# Patient Record
Sex: MALE | Race: WHITE | NOT HISPANIC OR LATINO | URBAN - METROPOLITAN AREA
[De-identification: names, ages, dates, MRNs, and addresses within clinical notes are randomized per-mention and may not be internally consistent; named-entity substitution may affect disease eponyms.]

---

## 2017-05-11 ENCOUNTER — OUTPATIENT (OUTPATIENT)
Dept: OUTPATIENT SERVICES | Facility: HOSPITAL | Age: 72
LOS: 1 days | Discharge: HOME | End: 2017-05-11

## 2017-05-11 VITALS
RESPIRATION RATE: 14 BRPM | OXYGEN SATURATION: 97 % | WEIGHT: 300.05 LBS | SYSTOLIC BLOOD PRESSURE: 153 MMHG | TEMPERATURE: 97 F | HEIGHT: 74 IN | DIASTOLIC BLOOD PRESSURE: 74 MMHG | HEART RATE: 60 BPM

## 2017-05-11 DIAGNOSIS — Z98.890 OTHER SPECIFIED POSTPROCEDURAL STATES: Chronic | ICD-10-CM

## 2017-05-11 RX ORDER — SODIUM CHLORIDE 9 MG/ML
1000 INJECTION, SOLUTION INTRAVENOUS
Qty: 0 | Refills: 0 | Status: DISCONTINUED | OUTPATIENT
Start: 2017-05-11 | End: 2017-05-26

## 2017-05-19 DIAGNOSIS — G35 MULTIPLE SCLEROSIS: ICD-10-CM

## 2017-05-19 DIAGNOSIS — G20 PARKINSON'S DISEASE: ICD-10-CM

## 2017-05-19 DIAGNOSIS — Z12.11 ENCOUNTER FOR SCREENING FOR MALIGNANT NEOPLASM OF COLON: ICD-10-CM

## 2017-11-19 NOTE — ASU PREOP CHECKLIST - SIDE RAILS UP
Dolor de blake general sin causa   (General Headache Without Cause)    EL dolor de blake es un dolor o malestar que se siente en la alonzo de la blake o del flaco. Puede no tener cindy causa específica. Hay muchas causas y tipos de cristino de blake. Los más comunes son:   · Cefalea tensional.  · Cefaleas migrañosas.  · Cefalea en brotes.  · Cefaleas diarias crónicas.  INSTRUCCIONES PARA EL CUIDADO EN EL HOGAR   · Cumpla con todas las citas programadas con contreras médico o con el especialista al que lo hayan derivado.  · Sólo tome medicamentos de venta saqib o recetados para calmar el dolor o el malestar, según las indicaciones de contreras médico.  · Cuando sienta dolor de blake acuéstese en un cuarto oscuro y tranquilo.  · Lleve un registro diario para averiguar lo que puede provocarlo. Por ejemplo, escriba:  ¨ Lo que come y cassidy.  ¨ Cuánto tiempo duerme.  ¨ Todo cambio en la dieta o medicamentos.  · Intente con masajes u otras técnicas de relajación.  · Colóquese compresas de hielo o calor en la blake y en el flaco. Úselos 3 a 4 veces por día de 15 a 20 minutos por vez, o nuzhat sea necesario.  · Limite las situaciones de estrés.  · Siéntese con la espalda recta y no  tense los músculos.  · Si fuma, deje de hacerlo.  · Limite el consumo de bebidas alcohólicas.  · Consuma menos cantidad de cafeína o deje de tomarla.  · Coma y duerma en horarios regulares.  · Duerma entre 7 y 9 horas o nuzhat lo indique contreras médico.  · Mantenga las luces tenues si le molestan las luces brillantes y empeoran el dolor de blake.  SOLICITE ATENCIÓN MÉDICA SI:   · Tiene problemas con los medicamentos que le recetaron.  · Los medicamentos no le hacen efecto.  · El dolor de blake que sentía habitualmente es diferente.  · Tiene náuseas o vómitos.  SOLICITE ATENCIÓN MÉDICA DE INMEDIATO SI:   · El dolor se hace cada vez más intenso.  · Tiene fiebre.  · Presenta rigidez en el flaco.  · Sufre pérdida de la visión.  · Presenta debilidad muscular o pérdida  del control muscular.  · Comienza a perder el equilibrio o tiene problemas para caminar.  · Sufre mareos o se desmaya.  · Tiene síntomas graves que son diferentes a los primeros síntomas.     Esta información no tiene nuzhat fin reemplazar el consejo del médico. Asegúrese de hacerle al médico cualquier pregunta que tenga.     Document Released: 09/27/2006 Document Revised: 05/03/2016  [employer] Interactive Patient Education ©2016 [employer]    Sinusitis en adultos  (Sinusitis, Adult)  La sinusitis es el enrojecimiento, el dolor y la inflamación de los senos paranasales. Los senos paranasales son cavidades de aire que están dentro de los huesos de la liban. Se encuentran debajo de los ojos, en la mitad de la frente y encima de los ojos. En los senos paranasales sanos, el moco puede drenar y el aire circula a través de ellos en contreras guido hacia la nariz. Sin embargo, cuando se inflaman, el moco y el aire quedan atrapados. [place] hace que se desarrollen bacterias y otros gérmenes que causan infección.  La sinusitis puede desarrollarse rápidamente y durar solo un tiempo corto (aguda) o continuar por un período natty (crónica). La sinusitis que dura más de 12 semanas se considera crónica.  CAUSAS  Las causas de la sinusitis son:  · Alergias.  · Las anomalías estructurales, nuzhat el desplazamiento del cartílago que separa las fosas nasales (desvío del tabique), que puede disminuir el flujo de aire por la nariz y los senos paranasales, y afectar contreras drenaje.  · Las anomalías funcionales, nuzhat cuando los pequeños pelos (cilias) que se encuentran en los senos paranasales y ayudan a eliminar el moco no funcionan correctamente o no están presentes.  SIGNOS Y SÍNTOMAS  Los síntomas de la sinusitis aguda y crónica son los mismos. Los síntomas principales son el dolor y la presión alrededor de los senos paranasales afectados. Otros síntomas son:  · Dolor en los dientes superiores.  · Dolor de oídos.  · Dolor de blake.  · Mal  aliento.  · Disminución del sentido del olfato y del gusto.  · Tos, que empeora al acostarse.  · Fatiga.  · Fiebre.  · Drenaje de moco espeso por la nariz, que generalmente es de color yesenia y puede contener pus (purulento).  · Hinchazón y calor en los senos paranasales afectados.  DIAGNÓSTICO  Contreras médico le realizará un examen físico. [place] el examen, el médico puede hacer cualquiera de estas cosas para determinar si usted tiene sinusitis aguda o crónica:  · Le revisará la nariz para buscar signos de crecimientos anormales en las fosas nasales (pólipos nasales).  · [place] los senos paranasales afectados para buscar signos de infección.  · Observará la parte interna de los senos paranasales con un dispositivo que tiene cindy nino (endoscopio).  Si el médico sospecha que usted sufre sinusitis crónica, podrá indicar cindy o más de las siguientes pruebas:  · Pruebas de alergia.  · Cultivo de las secreciones nasales. Se extrae cindy muestra de moco de la nariz, que se envía al laboratorio para detectar bacterias.  · Citología nasal. Se extrae cindy muestra de moco de la nariz, que el médico examina para determinar si la sinusitis está relacionada con cindy alergia.  TRATAMIENTO  La mayoría de los casos de sinusitis aguda se deben a cindy infección viral y se resuelven espontáneamente en un período de 10 días. A veces, se recetan medicamentos nuzhat ayuda para aliviar los síntomas tanto de la sinusitis aguda nuzhat de la crónica. Estos pueden incluir analgésicos, descongestivos, aerosoles nasales con corticoides o aerosoles de solución salina.  Sin embargo, para la sinusitis por infección bacteriana, el médico le recetará antibióticos. Los antibióticos son medicamentos que destruyen las bacterias que causan la infección.  Con poca frecuencia, la sinusitis tiene contreras origen en cindy infección por hongos. En estos casos, el médico le recetará un medicamento antimicótico.  Para algunos casos de sinusitis crónica, es necesario someterse a cindy  cirugía. Generalmente, se trata de casos en los que la sinusitis se repite más de 3 veces al año, a pesar de otros tratamientos.  INSTRUCCIONES PARA EL CUIDADO EN EL HOGAR  · Beber abundante agua. Los líquidos ayudan a disolver el moco, para que drene más fácilmente de los senos paranasales.  · Use un humidificador.  · Inhale vapor de 3 a 4 veces al día (por ejemplo, siéntese en el baño con la ducha abierta).  · Aplíquese un paño tibio y húmedo en la liban 3 o 4 veces al día o según las indicaciones del médico.  · Use un aerosol nasal salino para ayudar a humedecer y limpiar los senos nasales.  · [place] los medicamentos solamente nuzhat se lo haya indicado el médico.  · Si le recetaron un antimicótico o un antibiótico, asegúrese de terminarlos, incluso si comienza a sentirse mejor.  SOLICITE ATENCIÓN MÉDICA DE INMEDIATO SI:  · Siente más dolor o sufre cristino de blake intensos.  · Tiene náuseas, vómitos o somnolencia.  · Observa hinchazón alrededor del suman.  · Tiene problemas de visión.  · Presenta rigidez en el flaco.  · Tiene dificultad para respirar.     Esta información no tiene nuzhat fin reemplazar el consejo del médico. Asegúrese de hacerle al médico cualquier pregunta que tenga.     Document Released: 09/27/2006 Document Revised: 01/08/2016  [employer] Interactive Patient Education ©2016 [employer]     done

## 2019-07-16 ENCOUNTER — INPATIENT (INPATIENT)
Facility: HOSPITAL | Age: 74
LOS: 2 days | Discharge: NURSING, REHAB OR LONG-TERM CARE | End: 2019-07-19
Attending: HOSPITALIST | Admitting: INTERNAL MEDICINE
Payer: MEDICARE

## 2019-07-16 VITALS
SYSTOLIC BLOOD PRESSURE: 106 MMHG | DIASTOLIC BLOOD PRESSURE: 74 MMHG | HEART RATE: 78 BPM | TEMPERATURE: 99 F | RESPIRATION RATE: 18 BRPM | OXYGEN SATURATION: 96 %

## 2019-07-16 DIAGNOSIS — K56.7 ILEUS, UNSPECIFIED: ICD-10-CM

## 2019-07-16 DIAGNOSIS — G20 PARKINSON'S DISEASE: ICD-10-CM

## 2019-07-16 DIAGNOSIS — G35 MULTIPLE SCLEROSIS: ICD-10-CM

## 2019-07-16 DIAGNOSIS — Z29.9 ENCOUNTER FOR PROPHYLACTIC MEASURES, UNSPECIFIED: ICD-10-CM

## 2019-07-16 DIAGNOSIS — Z95.0 PRESENCE OF CARDIAC PACEMAKER: Chronic | ICD-10-CM

## 2019-07-16 DIAGNOSIS — L97.109 NON-PRESSURE CHRONIC ULCER OF UNSPECIFIED THIGH WITH UNSPECIFIED SEVERITY: ICD-10-CM

## 2019-07-16 DIAGNOSIS — Z98.890 OTHER SPECIFIED POSTPROCEDURAL STATES: Chronic | ICD-10-CM

## 2019-07-16 PROBLEM — K58.2 MIXED IRRITABLE BOWEL SYNDROME: Chronic | Status: ACTIVE | Noted: 2017-05-11

## 2019-07-16 LAB
ABO RH CONFIRMATION: SIGNIFICANT CHANGE UP
ALBUMIN SERPL ELPH-MCNC: 3.4 G/DL — SIGNIFICANT CHANGE UP (ref 3.3–5)
ALP SERPL-CCNC: 123 U/L — HIGH (ref 40–120)
ALT FLD-CCNC: 14 U/L — SIGNIFICANT CHANGE UP (ref 12–78)
ANION GAP SERPL CALC-SCNC: 7 MMOL/L — SIGNIFICANT CHANGE UP (ref 5–17)
APTT BLD: 27.7 SEC — SIGNIFICANT CHANGE UP (ref 27.5–36.3)
AST SERPL-CCNC: 12 U/L — LOW (ref 15–37)
BASOPHILS # BLD AUTO: 0.04 K/UL — SIGNIFICANT CHANGE UP (ref 0–0.2)
BASOPHILS NFR BLD AUTO: 0.4 % — SIGNIFICANT CHANGE UP (ref 0–2)
BILIRUB SERPL-MCNC: 0.7 MG/DL — SIGNIFICANT CHANGE UP (ref 0.2–1.2)
BLD GP AB SCN SERPL QL: SIGNIFICANT CHANGE UP
BUN SERPL-MCNC: 16 MG/DL — SIGNIFICANT CHANGE UP (ref 7–23)
CALCIUM SERPL-MCNC: 9.1 MG/DL — SIGNIFICANT CHANGE UP (ref 8.5–10.1)
CHLORIDE SERPL-SCNC: 105 MMOL/L — SIGNIFICANT CHANGE UP (ref 96–108)
CO2 SERPL-SCNC: 30 MMOL/L — SIGNIFICANT CHANGE UP (ref 22–31)
CREAT SERPL-MCNC: 1.1 MG/DL — SIGNIFICANT CHANGE UP (ref 0.5–1.3)
EOSINOPHIL # BLD AUTO: 0.06 K/UL — SIGNIFICANT CHANGE UP (ref 0–0.5)
EOSINOPHIL NFR BLD AUTO: 0.6 % — SIGNIFICANT CHANGE UP (ref 0–6)
GLUCOSE SERPL-MCNC: 138 MG/DL — HIGH (ref 70–99)
HCT VFR BLD CALC: 45.2 % — SIGNIFICANT CHANGE UP (ref 39–50)
HGB BLD-MCNC: 14.9 G/DL — SIGNIFICANT CHANGE UP (ref 13–17)
IMM GRANULOCYTES NFR BLD AUTO: 0.4 % — SIGNIFICANT CHANGE UP (ref 0–1.5)
INR BLD: 1.08 RATIO — SIGNIFICANT CHANGE UP (ref 0.88–1.16)
LYMPHOCYTES # BLD AUTO: 1.18 K/UL — SIGNIFICANT CHANGE UP (ref 1–3.3)
LYMPHOCYTES # BLD AUTO: 11.8 % — LOW (ref 13–44)
MAGNESIUM SERPL-MCNC: 2.5 MG/DL — SIGNIFICANT CHANGE UP (ref 1.6–2.6)
MCHC RBC-ENTMCNC: 29.3 PG — SIGNIFICANT CHANGE UP (ref 27–34)
MCHC RBC-ENTMCNC: 33 GM/DL — SIGNIFICANT CHANGE UP (ref 32–36)
MCV RBC AUTO: 88.8 FL — SIGNIFICANT CHANGE UP (ref 80–100)
MONOCYTES # BLD AUTO: 0.35 K/UL — SIGNIFICANT CHANGE UP (ref 0–0.9)
MONOCYTES NFR BLD AUTO: 3.5 % — SIGNIFICANT CHANGE UP (ref 2–14)
NEUTROPHILS # BLD AUTO: 8.33 K/UL — HIGH (ref 1.8–7.4)
NEUTROPHILS NFR BLD AUTO: 83.3 % — HIGH (ref 43–77)
PLATELET # BLD AUTO: 171 K/UL — SIGNIFICANT CHANGE UP (ref 150–400)
POTASSIUM SERPL-MCNC: 3.9 MMOL/L — SIGNIFICANT CHANGE UP (ref 3.5–5.3)
POTASSIUM SERPL-SCNC: 3.9 MMOL/L — SIGNIFICANT CHANGE UP (ref 3.5–5.3)
PROT SERPL-MCNC: 8.1 GM/DL — SIGNIFICANT CHANGE UP (ref 6–8.3)
PROTHROM AB SERPL-ACNC: 12 SEC — SIGNIFICANT CHANGE UP (ref 10–12.9)
RBC # BLD: 5.09 M/UL — SIGNIFICANT CHANGE UP (ref 4.2–5.8)
RBC # FLD: 13.2 % — SIGNIFICANT CHANGE UP (ref 10.3–14.5)
SODIUM SERPL-SCNC: 142 MMOL/L — SIGNIFICANT CHANGE UP (ref 135–145)
TYPE + AB SCN PNL BLD: SIGNIFICANT CHANGE UP
WBC # BLD: 10 K/UL — SIGNIFICANT CHANGE UP (ref 3.8–10.5)
WBC # FLD AUTO: 10 K/UL — SIGNIFICANT CHANGE UP (ref 3.8–10.5)

## 2019-07-16 PROCEDURE — 71045 X-RAY EXAM CHEST 1 VIEW: CPT | Mod: 26

## 2019-07-16 PROCEDURE — 74177 CT ABD & PELVIS W/CONTRAST: CPT | Mod: 26

## 2019-07-16 PROCEDURE — 93010 ELECTROCARDIOGRAM REPORT: CPT

## 2019-07-16 PROCEDURE — 99285 EMERGENCY DEPT VISIT HI MDM: CPT

## 2019-07-16 RX ORDER — BACLOFEN 100 %
1 POWDER (GRAM) MISCELLANEOUS
Qty: 0 | Refills: 0 | DISCHARGE

## 2019-07-16 RX ORDER — SODIUM CHLORIDE 9 MG/ML
1000 INJECTION INTRAMUSCULAR; INTRAVENOUS; SUBCUTANEOUS
Refills: 0 | Status: DISCONTINUED | OUTPATIENT
Start: 2019-07-16 | End: 2019-07-17

## 2019-07-16 RX ORDER — SODIUM CHLORIDE 9 MG/ML
1000 INJECTION INTRAMUSCULAR; INTRAVENOUS; SUBCUTANEOUS ONCE
Refills: 0 | Status: COMPLETED | OUTPATIENT
Start: 2019-07-16 | End: 2019-07-16

## 2019-07-16 RX ORDER — ALPRAZOLAM 0.25 MG
0 TABLET ORAL
Qty: 0 | Refills: 0 | DISCHARGE

## 2019-07-16 RX ORDER — PANTOPRAZOLE SODIUM 20 MG/1
40 TABLET, DELAYED RELEASE ORAL ONCE
Refills: 0 | Status: COMPLETED | OUTPATIENT
Start: 2019-07-16 | End: 2019-07-16

## 2019-07-16 RX ORDER — ONDANSETRON 8 MG/1
4 TABLET, FILM COATED ORAL ONCE
Refills: 0 | Status: COMPLETED | OUTPATIENT
Start: 2019-07-16 | End: 2019-07-16

## 2019-07-16 RX ORDER — ONDANSETRON 8 MG/1
4 TABLET, FILM COATED ORAL EVERY 6 HOURS
Refills: 0 | Status: DISCONTINUED | OUTPATIENT
Start: 2019-07-16 | End: 2019-07-19

## 2019-07-16 RX ORDER — CITALOPRAM 10 MG/1
1 TABLET, FILM COATED ORAL
Qty: 0 | Refills: 0 | DISCHARGE

## 2019-07-16 RX ADMIN — ONDANSETRON 4 MILLIGRAM(S): 8 TABLET, FILM COATED ORAL at 16:12

## 2019-07-16 RX ADMIN — ONDANSETRON 4 MILLIGRAM(S): 8 TABLET, FILM COATED ORAL at 17:16

## 2019-07-16 RX ADMIN — PANTOPRAZOLE SODIUM 40 MILLIGRAM(S): 20 TABLET, DELAYED RELEASE ORAL at 16:12

## 2019-07-16 RX ADMIN — SODIUM CHLORIDE 1000 MILLILITER(S): 9 INJECTION INTRAMUSCULAR; INTRAVENOUS; SUBCUTANEOUS at 16:12

## 2019-07-16 RX ADMIN — SODIUM CHLORIDE 1000 MILLILITER(S): 9 INJECTION INTRAMUSCULAR; INTRAVENOUS; SUBCUTANEOUS at 17:12

## 2019-07-16 NOTE — H&P ADULT - NSHPOUTPATIENTPROVIDERS_GEN_ALL_CORE
PMD(?): Dr. Tracy  Neuro: Dr. Lemon? [[place]] PMD(?): Dr. Tracy  Neuro: Dr. Irving? [[place]] PMD(?): Dr. Estela Walton [phone]  Neuro: Dr. Irving? [[place]]

## 2019-07-16 NOTE — ED PROVIDER NOTE - OBJECTIVE STATEMENT
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 Pt had 2 episodes of brown emesis this AM after breakfast. +guaiac at [facility]. Pt states that he had a BM 30 minutes PTA which was normal. Denies fever, chills, abd pain, weakness, HA, vision changes, or speech changes. States that at baseline he has a stutter and is non ambulatory. Pt was sent to ED for further workup.

## 2019-07-16 NOTE — ED ADULT NURSE REASSESSMENT NOTE - NS ED NURSE REASSESS COMMENT FT1
Patient received from previous nurse. Pt is A&Ox4, VSS on RA but slow to respond as baseline. Pt is resting comfortably in their bed at this time, denies any pain or discomfort at this time. Safety and comfort measures in place. Hourly rounding will be done on my time. Will continue to monitor.

## 2019-07-16 NOTE — H&P ADULT - HISTORY OF PRESENT ILLNESS
75 yo M presenting with intractable nausea and vomiting x 1 day. Noted vomit to be brown in color. Denies any new foods, new medications, recent travel or sick contacts. He did not take any medicine for the vomiting. 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 Noted vomit to be brown in color after his breakfast. No abdominal pain, diarrhea, chest pain, SOB or acute weakness. Denies any new foods, new medications, recent travel or sick contacts. He did not take any medicine for the vomiting. No recent abdominal surgeries. 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 He did not eat breakfast and then vomited approximately 200 ccs of brown liquid. No abdominal pain, diarrhea, chest pain, SOB or acute weakness. Vitals remained stable. Denies any new foods, new medications, recent travel or sick contacts. He did not take any medicine for the vomiting. No recent abdominal surgeries. 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 Associated with hiccups. He did not eat breakfast and then vomited approximately 200 ccs of brown liquid. No abdominal pain, diarrhea, chest pain, SOB or acute weakness. Vitals remained stable. Denies any new foods, new medications, recent travel or sick contacts. He did not take any medicine for the vomiting. No recent abdominal surgeries.

## 2019-07-16 NOTE — H&P ADULT - PROBLEM SELECTOR PLAN 1
- place on observation  - NPO  - Zofran 4 mg IV prn  - follow up serial H/H  - I/Os - place on observation  - NPO  - Zofran 4 mg IV prn  - H/H currently stable, in regards to te hematemesis  - I/Os - place on observation  - NPO  - Zofran 4 mg IV prn  - avoid opiates  - H/H currently stable, in regards to te hematemesis  - I/Os Acute colonic pseudo-obstruction likely secondary to Parkinson's and MS.   - IVF hydration  - NPO  - Zofran 4 mg IV prn  - avoid offending medications (i.e opiates, CCBs, anticholinergics, osmotic laxatives)  - serial abdominal exams q12h to monitor for signs of peritonitis/perforation  - correct electrolytes prn  - GI consult  - I/Os

## 2019-07-16 NOTE — H&P ADULT - PROBLEM SELECTOR PLAN 2
- continue home medications - H/H stable at this time, will trend  - maintain Hgb >8  - transfuse prn  - Protonix IV Likely due to gastritis, larry. with alcohol history.   - H/H stable at this time, will trend  - maintain Hgb >8  - transfuse prn  - Protonix IV

## 2019-07-16 NOTE — ED ADULT NURSE REASSESSMENT NOTE - NS ED NURSE REASSESS COMMENT FT1
pt emesis episode x1. pt changed and placed in gown and given new linens and blanket. given 4 mg zofran. MD boo aware

## 2019-07-16 NOTE — ED PROVIDER NOTE - PMH
Irritable bowel syndrome with constipation and diarrhea    MS (multiple sclerosis)    Parkinsons disease

## 2019-07-16 NOTE — H&P ADULT - ATTENDING COMMENTS
Patient seen and examined after initial evaluation above by family medicine resident. Case discussed and reviewed in detail. Please note my plan below.     75 y/o M with PMH of IBS, parkinson's disease, MS, gastrum antrum erosion, BPH, p/w nausea vomiting x 1 day.     *Nausea/vomiting w/ possible colonic ileus  -NPO  -IVF  -Surgery consult  -Zofran PRN  -Will hold diuretics     *Hematemesis  -Guiac positive vomit reported at [facility]  -Protonix IV  -Trend H/H  -GI consult    *H/o parkinson's / MS   -C/w home meds and f/u outpatient for further management     *DVT ppx  -SCDs 2/2 hematemesis

## 2019-07-16 NOTE — H&P ADULT - NSHPREVIEWOFSYSTEMS_GEN_ALL_CORE
REVIEW OF SYSTEMS:    CONSTITUTIONAL: No weakness, fevers or chills  EYES/ENT: No visual changes;  No vertigo or throat pain   NECK: No pain or stiffness  RESPIRATORY: No cough, wheezing, hemoptysis; No shortness of breath  CARDIOVASCULAR: No chest pain or palpitations  GASTROINTESTINAL: No abdominal or epigastric pain. + nausea, +vomiting/hematemesis; No diarrhea or constipation. No melena or hematochezia.  GENITOURINARY: No dysuria, frequency or hematuria  NEUROLOGICAL: No numbness or weakness  SKIN: No itching, rashes

## 2019-07-16 NOTE — ED ADULT TRIAGE NOTE - CHIEF COMPLAINT QUOTE
patient brought in by EMS from [facility] for vomiting since yesterday. Dr. Shankar called ahead and stated patient was guiac positive. patient has chronic loose stool and Dr. Shankar was concerned for obstruction. zofran given and D51/2NS started at [facility]. patient denies any abdominal pain. no acute distress noted. patient awake and alert. noted to be slow answering at times due to deep brain stimulator.

## 2019-07-16 NOTE — H&P ADULT - ASSESSMENT
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 Emesis foudn to be guaiac positive but stool was not. Nausea and vomiting likely due to ileus. 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 Emesis found to be guaiac positive but stool was not. Nausea and vomiting likely due to acute colonic pseudo-obstruction. CT abdomen w/ IV contrast positive for colonic dilatation without evidence of obstruction.

## 2019-07-16 NOTE — H&P ADULT - NSHPSOCIALHISTORY_GEN_ALL_CORE
Lives in [facility]. Has not smoked since the age of 36. Drinks Scotch a few times a week. No current or history of illicit drug use.

## 2019-07-16 NOTE — H&P ADULT - NSICDXPASTMEDICALHX_GEN_ALL_CORE_FT
PAST MEDICAL HISTORY:  Irritable bowel syndrome with constipation and diarrhea     MS (multiple sclerosis)     Parkinsons disease

## 2019-07-16 NOTE — ED PROVIDER NOTE - CONSTITUTIONAL, MLM
normal... Well appearing, well nourished, awake, alert, oriented to person, place, time/situation and in no apparent distress. +stutter.

## 2019-07-16 NOTE — ED ADULT NURSE NOTE - CHIEF COMPLAINT QUOTE
patient brought in by EMS from [facility] for vomiting since yesterday. Dr. Shankar called ahead and stated patient was guiac positive. patient has chronic loose stool and Dr. Shankar was concerned for obstruction. zofran given and D51/2NS started at [facility]. patient denies any abdominal pain. no acute distress noted. patient awake and alert. noted to be slow answering at times due to deep brain stimulator.

## 2019-07-16 NOTE — H&P ADULT - NSHPPHYSICALEXAM_GEN_ALL_CORE
Vital Signs Last 24 Hrs  T(C): 36.8 (16 Jul 2019 20:53), Max: 37.2 (16 Jul 2019 15:22)  T(F): 98.3 (16 Jul 2019 20:53), Max: 99 (16 Jul 2019 15:22)  HR: 81 (16 Jul 2019 20:53) (78 - 81)  BP: 116/57 (16 Jul 2019 20:53) (106/74 - 121/53)  RR: 17 (16 Jul 2019 20:53) (17 - 18)  SpO2: 95% (16 Jul 2019 20:53) (94% - 96%)    PHYSICAL EXAM:  GENERAL: NAD, obese  HEAD:  Atraumatic, Normocephalic  EYES: EOMI, PERRLA, conjunctiva and sclera clear  NECK: Supple, No JVD  PULMONARY: Clear to auscultation bilaterally; No wheeze  CARDIAC: Regular rate and rhythm; No murmurs, rubs, or gallops  GASTROINTESTINAL: diffuse distension; Bowel sounds minimally present  MUSCULOSKELETAL:  2+ Peripheral Pulses, No clubbing, cyanosis, or edema  PSYCH: AAOx3  NEUROLOGY: non-focal, chronically slowed speech  SKIN: No rashes or lesions Vital Signs Last 24 Hrs  T(C): 36.8 (16 Jul 2019 20:53), Max: 37.2 (16 Jul 2019 15:22)  T(F): 98.3 (16 Jul 2019 20:53), Max: 99 (16 Jul 2019 15:22)  HR: 81 (16 Jul 2019 20:53) (78 - 81)  BP: 116/57 (16 Jul 2019 20:53) (106/74 - 121/53)  RR: 17 (16 Jul 2019 20:53) (17 - 18)  SpO2: 95% (16 Jul 2019 20:53) (94% - 96%)    PHYSICAL EXAM:  GENERAL: NAD, obese  HEAD:  Atraumatic, Normocephalic  EYES: EOMI, PERRLA, conjunctiva and sclera clear  NECK: Supple, No JVD  PULMONARY: Clear to auscultation bilaterally; No wheeze  CARDIAC: Regular rate and rhythm; No murmurs, rubs, or gallops  GASTROINTESTINAL: Soft, non-tender to deep palpation, +diffuse distension; Bowel sounds present, no rebound / guarding   MUSCULOSKELETAL:  2+ Peripheral Pulses, No clubbing, cyanosis, or edema  PSYCH: AAOx3  NEUROLOGY: non-focal, chronically slowed speech  SKIN: No rashes or lesions

## 2019-07-16 NOTE — ED PROVIDER NOTE - PROGRESS NOTE DETAILS
Rectal exam: brown stool, guaiac negative, QC positive, [id number] Tremaine Guido D.O. Pt with colonic ileus, no signs of obstruction, infection, or ischemia.  Labs all WNL.  Will PO challenge.  Tremaine Guido D.O.

## 2019-07-16 NOTE — ED ADULT NURSE NOTE - NSIMPLEMENTINTERV_GEN_ALL_ED
Implemented All Fall with Harm Risk Interventions:  [place] to call system. Call bell, personal items and telephone within reach. Instruct patient to call for assistance. Room bathroom lighting operational. Non-slip footwear when patient is off stretcher. Physically safe environment: no spills, clutter or unnecessary equipment. Stretcher in lowest position, wheels locked, appropriate side rails in place. Provide visual cue, wrist band, yellow gown, etc. Monitor gait and stability. Monitor for mental status changes and reorient to person, place, and time. Review medications for side effects contributing to fall risk. Reinforce activity limits and safety measures with patient and family. Provide visual clues: red socks.

## 2019-07-16 NOTE — ED PROVIDER NOTE - CARE PLAN
Principal Discharge DX:	Ileus Principal Discharge DX:	Ileus  Secondary Diagnosis:	Intractable nausea and vomiting

## 2019-07-16 NOTE — ED PROVIDER NOTE - CLINICAL SUMMARY MEDICAL DECISION MAKING FREE TEXT BOX
Pt with ileus on CT scan.  Labs unremarkable.  Pt vomited profusely after his oral contrast.  Following scan ate some crackers, and some gingerale.  He didn't vomit but became very nauseous following this.  Concern that patient will not tolerate PO as outpatient.  Will admit for ileus, intractable nausea, vomiting.  Obs to medicine service, Dr. Ordonez.

## 2019-07-16 NOTE — H&P ADULT - NSICDXPASTSURGICALHX_GEN_ALL_CORE_FT
PAST SURGICAL HISTORY:  History of brain surgery for stimulator. 1997,1998 PAST SURGICAL HISTORY:  History of brain surgery for stimulator. 1997,1998    S/P placement of cardiac pacemaker

## 2019-07-16 NOTE — ED ADULT NURSE NOTE - OBJECTIVE STATEMENT
pt presents to ED BIBEMS from [facility] facility c/o vomiting starting today after breakfast. denies abdominal pain. denies pain anywhere. + nausea. pt is AOx3 but slow to respond d/t neurostimulator. hx parkisons, IBS, neurostimulator. guaiz + done on vomit at [facility]. pt is in no active distress. RAC 20G placed by RN, labs sent, and fluids going. given oral contrast to drink for CT scan. pt verbalizes understanding and began drink at 1600. pt repositioned and made more comfortable. pt states comfort. Rn will ctm.

## 2019-07-17 DIAGNOSIS — K59.8 OTHER SPECIFIED FUNCTIONAL INTESTINAL DISORDERS: ICD-10-CM

## 2019-07-17 DIAGNOSIS — K92.0 HEMATEMESIS: ICD-10-CM

## 2019-07-17 LAB
ALBUMIN SERPL ELPH-MCNC: 3.1 G/DL — LOW (ref 3.3–5)
ALP SERPL-CCNC: 103 U/L — SIGNIFICANT CHANGE UP (ref 40–120)
ALT FLD-CCNC: 13 U/L — SIGNIFICANT CHANGE UP (ref 12–78)
ANION GAP SERPL CALC-SCNC: 7 MMOL/L — SIGNIFICANT CHANGE UP (ref 5–17)
AST SERPL-CCNC: 11 U/L — LOW (ref 15–37)
BASOPHILS # BLD AUTO: 0.02 K/UL — SIGNIFICANT CHANGE UP (ref 0–0.2)
BASOPHILS NFR BLD AUTO: 0.2 % — SIGNIFICANT CHANGE UP (ref 0–2)
BILIRUB SERPL-MCNC: 0.8 MG/DL — SIGNIFICANT CHANGE UP (ref 0.2–1.2)
BUN SERPL-MCNC: 22 MG/DL — SIGNIFICANT CHANGE UP (ref 7–23)
CALCIUM SERPL-MCNC: 8.5 MG/DL — SIGNIFICANT CHANGE UP (ref 8.5–10.1)
CHLORIDE SERPL-SCNC: 108 MMOL/L — SIGNIFICANT CHANGE UP (ref 96–108)
CO2 SERPL-SCNC: 27 MMOL/L — SIGNIFICANT CHANGE UP (ref 22–31)
CREAT SERPL-MCNC: 1.04 MG/DL — SIGNIFICANT CHANGE UP (ref 0.5–1.3)
EOSINOPHIL # BLD AUTO: 0.28 K/UL — SIGNIFICANT CHANGE UP (ref 0–0.5)
EOSINOPHIL NFR BLD AUTO: 2.4 % — SIGNIFICANT CHANGE UP (ref 0–6)
GLUCOSE BLDC GLUCOMTR-MCNC: 114 MG/DL — HIGH (ref 70–99)
GLUCOSE BLDC GLUCOMTR-MCNC: 122 MG/DL — HIGH (ref 70–99)
GLUCOSE BLDC GLUCOMTR-MCNC: 97 MG/DL — SIGNIFICANT CHANGE UP (ref 70–99)
GLUCOSE BLDC GLUCOMTR-MCNC: 98 MG/DL — SIGNIFICANT CHANGE UP (ref 70–99)
GLUCOSE SERPL-MCNC: 120 MG/DL — HIGH (ref 70–99)
HBA1C BLD-MCNC: 5.5 % — SIGNIFICANT CHANGE UP (ref 4–5.6)
HCT VFR BLD CALC: 38 % — LOW (ref 39–50)
HCV AB S/CO SERPL IA: 0.25 S/CO — SIGNIFICANT CHANGE UP (ref 0–0.99)
HCV AB SERPL-IMP: SIGNIFICANT CHANGE UP
HGB BLD-MCNC: 12.2 G/DL — LOW (ref 13–17)
IMM GRANULOCYTES NFR BLD AUTO: 0.4 % — SIGNIFICANT CHANGE UP (ref 0–1.5)
LYMPHOCYTES # BLD AUTO: 1.78 K/UL — SIGNIFICANT CHANGE UP (ref 1–3.3)
LYMPHOCYTES # BLD AUTO: 15.4 % — SIGNIFICANT CHANGE UP (ref 13–44)
MCHC RBC-ENTMCNC: 29.2 PG — SIGNIFICANT CHANGE UP (ref 27–34)
MCHC RBC-ENTMCNC: 32.1 GM/DL — SIGNIFICANT CHANGE UP (ref 32–36)
MCV RBC AUTO: 90.9 FL — SIGNIFICANT CHANGE UP (ref 80–100)
MONOCYTES # BLD AUTO: 0.71 K/UL — SIGNIFICANT CHANGE UP (ref 0–0.9)
MONOCYTES NFR BLD AUTO: 6.1 % — SIGNIFICANT CHANGE UP (ref 2–14)
NEUTROPHILS # BLD AUTO: 8.72 K/UL — HIGH (ref 1.8–7.4)
NEUTROPHILS NFR BLD AUTO: 75.5 % — SIGNIFICANT CHANGE UP (ref 43–77)
PLATELET # BLD AUTO: 164 K/UL — SIGNIFICANT CHANGE UP (ref 150–400)
POTASSIUM SERPL-MCNC: 3.5 MMOL/L — SIGNIFICANT CHANGE UP (ref 3.5–5.3)
POTASSIUM SERPL-SCNC: 3.5 MMOL/L — SIGNIFICANT CHANGE UP (ref 3.5–5.3)
PROT SERPL-MCNC: 7.6 GM/DL — SIGNIFICANT CHANGE UP (ref 6–8.3)
RBC # BLD: 4.18 M/UL — LOW (ref 4.2–5.8)
RBC # FLD: 13.5 % — SIGNIFICANT CHANGE UP (ref 10.3–14.5)
SODIUM SERPL-SCNC: 142 MMOL/L — SIGNIFICANT CHANGE UP (ref 135–145)
WBC # BLD: 11.56 K/UL — HIGH (ref 3.8–10.5)
WBC # FLD AUTO: 11.56 K/UL — HIGH (ref 3.8–10.5)

## 2019-07-17 PROCEDURE — 99223 1ST HOSP IP/OBS HIGH 75: CPT

## 2019-07-17 PROCEDURE — 99222 1ST HOSP IP/OBS MODERATE 55: CPT

## 2019-07-17 PROCEDURE — 93971 EXTREMITY STUDY: CPT | Mod: 26,LT

## 2019-07-17 RX ORDER — HEPARIN SODIUM 5000 [USP'U]/ML
5000 INJECTION INTRAVENOUS; SUBCUTANEOUS EVERY 12 HOURS
Refills: 0 | Status: DISCONTINUED | OUTPATIENT
Start: 2019-07-17 | End: 2019-07-19

## 2019-07-17 RX ORDER — FINASTERIDE 5 MG/1
5 TABLET, FILM COATED ORAL DAILY
Refills: 0 | Status: DISCONTINUED | OUTPATIENT
Start: 2019-07-17 | End: 2019-07-19

## 2019-07-17 RX ORDER — TAMSULOSIN HYDROCHLORIDE 0.4 MG/1
1 CAPSULE ORAL
Qty: 0 | Refills: 0 | DISCHARGE

## 2019-07-17 RX ORDER — CITALOPRAM 10 MG/1
20 TABLET, FILM COATED ORAL DAILY
Refills: 0 | Status: DISCONTINUED | OUTPATIENT
Start: 2019-07-17 | End: 2019-07-19

## 2019-07-17 RX ORDER — GLUCAGON INJECTION, SOLUTION 0.5 MG/.1ML
1 INJECTION, SOLUTION SUBCUTANEOUS ONCE
Refills: 0 | Status: DISCONTINUED | OUTPATIENT
Start: 2019-07-17 | End: 2019-07-19

## 2019-07-17 RX ORDER — ATORVASTATIN CALCIUM 80 MG/1
10 TABLET, FILM COATED ORAL DAILY
Refills: 0 | Status: DISCONTINUED | OUTPATIENT
Start: 2019-07-17 | End: 2019-07-19

## 2019-07-17 RX ORDER — PANTOPRAZOLE SODIUM 20 MG/1
40 TABLET, DELAYED RELEASE ORAL DAILY
Refills: 0 | Status: DISCONTINUED | OUTPATIENT
Start: 2019-07-17 | End: 2019-07-17

## 2019-07-17 RX ORDER — INSULIN LISPRO 100/ML
VIAL (ML) SUBCUTANEOUS AT BEDTIME
Refills: 0 | Status: DISCONTINUED | OUTPATIENT
Start: 2019-07-17 | End: 2019-07-18

## 2019-07-17 RX ORDER — POTASSIUM CHLORIDE 20 MEQ
1 PACKET (EA) ORAL
Qty: 0 | Refills: 0 | DISCHARGE

## 2019-07-17 RX ORDER — DEXTROSE 50 % IN WATER 50 %
15 SYRINGE (ML) INTRAVENOUS ONCE
Refills: 0 | Status: DISCONTINUED | OUTPATIENT
Start: 2019-07-17 | End: 2019-07-18

## 2019-07-17 RX ORDER — DEXTROSE 50 % IN WATER 50 %
25 SYRINGE (ML) INTRAVENOUS ONCE
Refills: 0 | Status: DISCONTINUED | OUTPATIENT
Start: 2019-07-17 | End: 2019-07-18

## 2019-07-17 RX ORDER — SODIUM CHLORIDE 9 MG/ML
1000 INJECTION, SOLUTION INTRAVENOUS
Refills: 0 | Status: DISCONTINUED | OUTPATIENT
Start: 2019-07-17 | End: 2019-07-18

## 2019-07-17 RX ORDER — SODIUM CHLORIDE 9 MG/ML
1000 INJECTION INTRAMUSCULAR; INTRAVENOUS; SUBCUTANEOUS
Refills: 0 | Status: DISCONTINUED | OUTPATIENT
Start: 2019-07-17 | End: 2019-07-18

## 2019-07-17 RX ORDER — DEXTROSE 50 % IN WATER 50 %
12.5 SYRINGE (ML) INTRAVENOUS ONCE
Refills: 0 | Status: DISCONTINUED | OUTPATIENT
Start: 2019-07-17 | End: 2019-07-18

## 2019-07-17 RX ORDER — SODIUM CHLORIDE 9 MG/ML
1000 INJECTION, SOLUTION INTRAVENOUS
Refills: 0 | Status: DISCONTINUED | OUTPATIENT
Start: 2019-07-17 | End: 2019-07-17

## 2019-07-17 RX ORDER — SODIUM CHLORIDE 9 MG/ML
1000 INJECTION INTRAMUSCULAR; INTRAVENOUS; SUBCUTANEOUS
Refills: 0 | Status: DISCONTINUED | OUTPATIENT
Start: 2019-07-17 | End: 2019-07-17

## 2019-07-17 RX ORDER — ACETAMINOPHEN 500 MG
650 TABLET ORAL EVERY 6 HOURS
Refills: 0 | Status: DISCONTINUED | OUTPATIENT
Start: 2019-07-17 | End: 2019-07-19

## 2019-07-17 RX ORDER — PANTOPRAZOLE SODIUM 20 MG/1
8 TABLET, DELAYED RELEASE ORAL
Qty: 80 | Refills: 0 | Status: DISCONTINUED | OUTPATIENT
Start: 2019-07-17 | End: 2019-07-18

## 2019-07-17 RX ADMIN — SODIUM CHLORIDE 50 MILLILITER(S): 9 INJECTION INTRAMUSCULAR; INTRAVENOUS; SUBCUTANEOUS at 17:16

## 2019-07-17 RX ADMIN — PANTOPRAZOLE SODIUM 10 MG/HR: 20 TABLET, DELAYED RELEASE ORAL at 13:05

## 2019-07-17 RX ADMIN — PANTOPRAZOLE SODIUM 10 MG/HR: 20 TABLET, DELAYED RELEASE ORAL at 04:20

## 2019-07-17 RX ADMIN — SODIUM CHLORIDE 50 MILLILITER(S): 9 INJECTION INTRAMUSCULAR; INTRAVENOUS; SUBCUTANEOUS at 13:07

## 2019-07-17 RX ADMIN — SODIUM CHLORIDE 80 MILLILITER(S): 9 INJECTION, SOLUTION INTRAVENOUS at 02:19

## 2019-07-17 RX ADMIN — HEPARIN SODIUM 5000 UNIT(S): 5000 INJECTION INTRAVENOUS; SUBCUTANEOUS at 17:16

## 2019-07-17 NOTE — PATIENT PROFILE ADULT - VISION (WITH CORRECTIVE LENSES IF THE PATIENT USUALLY WEARS THEM):
wears glasses. Glasses not with him/Partially impaired: cannot see medication labels or newsprint, but can see obstacles in path, and the surrounding layout; can count fingers at arm's length

## 2019-07-17 NOTE — PROGRESS NOTE ADULT - ASSESSMENT
#Colonic Ilieus secondary to parkinson's disease?  -Stable at the moment despite distention, lytes wnl, no nausea/vomiting   -Zofran prn for n/v  -Per GI:Would insert rectal tube. Serial exams and KUB. Correct electrolytes. Avoid narcotics.   -Per Colorectal surgery: NPO for now and would recommend rectal tube  -Reviewed CT imaging with inhouse radiology and they said the largest diameter of the colon was   -Order stool softners    #left leg edema (lymphedema) with unknown etiology  - U/S of left leg was negative for DVT    #Parkinsons  -Continue home medications when able to PO again    DVT ppx---SCD's 75 y/o M with abdominal distension, nausea, vomiting secondary to olgive syndrome, now undergoing therapeutic treatment.     #Colonic Ilieus secondary to parkinson's disease?  -Stable at the moment despite distention, lytes wnl, no nausea/vomiting   -Zofran prn for n/v  -Per GI:Would insert rectal tube. Serial exams and KUB. Correct electrolytes. Avoid narcotics.   -Per Colorectal surgery: NPO for now and would recommend rectal tube  -Reviewed CT imaging with in house radiology and they said the largest diameter of the colon was 10.7Cm which is at the threshold of increased perforation.   -Ordered rectal trumpet for decompression and serial KUB's to moniter the progress  -monitor Bowel movements and urine output    #left leg edema (lymphedema) with unknown etiology  - U/S of left leg was negative for DVT    #Parkinsons  -Continue home medications when able to PO again    DVT ppx---SCD's

## 2019-07-17 NOTE — INPATIENT CERTIFICATION FOR MEDICARE PATIENTS - RISKS OF ADVERSE EVENTS
Concern for neurologic deterioration/Concern for renal deterioration/Concern for worsening infectious process/Concern for cardiopulmonary deterioration/Concern for delay in diagnosis and treatment/Other:

## 2019-07-17 NOTE — CONSULT NOTE ADULT - SUBJECTIVE AND OBJECTIVE BOX
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 Emesis found to be guaiac positive but stool was not. Nausea and vomiting likely due to acute colonic pseudo-obstruction. CT abdomen w/ IV contrast positive for colonic dilatation without evidence of obstruction.       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 Emesis found to be guaiac positive but stool was not. Nausea and vomiting likely due to acute colonic pseudo-obstruction. CT abdomen w/ IV contrast positive for colonic dilatation without evidence of obstruction.       Surgery called to evaluate for oglivies syndrome/colonic dilatation.  Patient unknown when last flatus or BM.     ICU Vital Signs Last 24 Hrs  T(C): 37 (17 Jul 2019 03:55), Max: 37.2 (16 Jul 2019 15:22)  T(F): 98.6 (17 Jul 2019 03:55), Max: 99 (16 Jul 2019 15:22)  HR: 75 (17 Jul 2019 03:55) (67 - 81)  BP: 148/92 (17 Jul 2019 03:55) (106/74 - 148/92)  BP(mean): --  ABP: --  ABP(mean): --  RR: 18 (17 Jul 2019 03:55) (16 - 18)  SpO2: 95% (17 Jul 2019 03:55) (94% - 96%)      Gen aaox3 nad  cardiac s1 s2   lungs clear  abd soft Distended, non tender no peritoneal signs  ext no edema                           14.9   10.00 )-----------( 171      ( 16 Jul 2019 16:05 )             45.2   07-16    142  |  105  |  16  ----------------------------<  138<H>  3.9   |  30  |  1.10    Ca    9.1      16 Jul 2019 16:05  Mg     2.5     07-16    TPro  8.1  /  Alb  3.4  /  TBili  0.7  /  DBili  x   /  AST  12<L>  /  ALT  14  /  AlkPhos  123<H>  07-16

## 2019-07-17 NOTE — CONSULT NOTE ADULT - ASSESSMENT
75 yo male with multiple medical issues including Parkinson's disease, admitted with nausea and vomiting. Would insert rectal tube. Serial exams and KUB. Correct electrolytes. Avoid narcotics.
73 yo M with PMHx IBS, Parkinson's, MS, with abd distension, likely Oglivies      -NPO for Now  -IVF  -Hold narcotics  -NGT if patient vomits  -Correct lytes  -GI consult   -Stool Softeners/Enema  -recommend Rectal tube    kevin Mercado

## 2019-07-17 NOTE — ED ADULT NURSE REASSESSMENT NOTE - NS ED NURSE REASSESS COMMENT FT1
rounded on pt. resting comfortably. dry. vss. bed assigned, pending report. will continue to monitor

## 2019-07-17 NOTE — CONSULT NOTE ADULT - SUBJECTIVE AND OBJECTIVE BOX
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 Associated with hiccups. He did not eat breakfast and then vomited approximately 200 ccs of brown liquid. No abdominal pain, diarrhea, chest pain, SOB or acute weakness. Vitals remained stable. Denies any new foods, new medications, recent travel or sick contacts. He did not take any medicine for the vomiting. No recent abdominal surgeries. (16 Jul 2019 23:29)  CT scan consistent with diffuse ileus without obstruction or volvulus. No complaints of abdominal pain currently.      PAST MEDICAL & SURGICAL HISTORY:  Irritable bowel syndrome with constipation and diarrhea  Parkinsons disease  MS (multiple sclerosis)  S/P placement of cardiac pacemaker  History of brain surgery: for stimulator. 1997,1998      MEDICATIONS  (STANDING):  atorvastatin Oral Tab/Cap - Peds 10 milliGRAM(s) Oral daily  citalopram 20 milliGRAM(s) Oral daily  dextrose 5%. 1000 milliLiter(s) (50 mL/Hr) IV Continuous <Continuous>  dextrose 50% Injectable 12.5 Gram(s) IV Push once  dextrose 50% Injectable 25 Gram(s) IV Push once  dextrose 50% Injectable 25 Gram(s) IV Push once  finasteride 5 milliGRAM(s) Oral daily  heparin  Injectable 5000 Unit(s) SubCutaneous every 12 hours  insulin lispro (HumaLOG) corrective regimen sliding scale   SubCutaneous at bedtime  pantoprazole Infusion 8 mG/Hr (10 mL/Hr) IV Continuous <Continuous>  sodium chloride 0.9%. 1000 milliLiter(s) (50 mL/Hr) IV Continuous <Continuous>    MEDICATIONS  (PRN):  acetaminophen   Tablet .. 650 milliGRAM(s) Oral every 6 hours PRN Temp greater or equal to 38C (100.4F), Mild Pain (1 - 3)  dextrose 40% Gel 15 Gram(s) Oral once PRN Blood Glucose LESS THAN 70 milliGRAM(s)/deciliter  glucagon  Injectable 1 milliGRAM(s) IntraMuscular once PRN Glucose LESS THAN 70 milligrams/deciliter  ondansetron Injectable 4 milliGRAM(s) IV Push every 6 hours PRN Nausea and/or Vomiting      Allergies    Pineapple (Unknown)  sulfa drugs (Unknown)    Intolerances        SOCIAL HISTORY:    FAMILY HISTORY:  FHx: hypertension: father      Vital Signs Last 24 Hrs  T(C): 36.8 (17 Jul 2019 11:01), Max: 37.2 (16 Jul 2019 15:22)  T(F): 98.2 (17 Jul 2019 11:01), Max: 99 (16 Jul 2019 15:22)  HR: 66 (17 Jul 2019 11:01) (66 - 81)  BP: 121/68 (17 Jul 2019 11:01) (106/74 - 148/92)  BP(mean): --  RR: 18 (17 Jul 2019 11:01) (16 - 18)  SpO2: 97% (17 Jul 2019 11:01) (94% - 97%)    PHYSICAL EXAM:    Respiratory: CTAB  Cardiovascular: S1 and S2, RRR, no M/G/R  Gastrointestinal: BS+, softly distended, nontender  LABS:                        12.2   11.56 )-----------( 164      ( 17 Jul 2019 08:55 )             38.0     07-17    142  |  108  |  22  ----------------------------<  120<H>  3.5   |  27  |  1.04    Ca    8.5      17 Jul 2019 08:55  Mg     2.5     07-16    TPro  7.6  /  Alb  3.1<L>  /  TBili  0.8  /  DBili  x   /  AST  11<L>  /  ALT  13  /  AlkPhos  103  07-17    PT/INR - ( 16 Jul 2019 16:05 )   PT: 12.0 sec;   INR: 1.08 ratio         PTT - ( 16 Jul 2019 16:05 )  PTT:27.7 sec  LIVER FUNCTIONS - ( 17 Jul 2019 08:55 )  Alb: 3.1 g/dL / Pro: 7.6 gm/dL / ALK PHOS: 103 U/L / ALT: 13 U/L / AST: 11 U/L / GGT: x             RADIOLOGY & ADDITIONAL STUDIES:

## 2019-07-17 NOTE — PATIENT PROFILE ADULT - INDICATE TYPE
Do Not Resuscitate (DNR)/Health Care Proxy (HCP)/Living Will/Medical Orders for Life-Sustaining Treatment (MOLST)

## 2019-07-17 NOTE — PROGRESS NOTE ADULT - ATTENDING COMMENTS
on exam- comfortable   -rs-aeeb, cta  -p/a-distended, soft, sluggish bowel sound  -cvs-s1s2 normal     #ct supportive care, flatus tube and monitoring     #management as per GI and surgical team

## 2019-07-18 LAB
ANION GAP SERPL CALC-SCNC: 7 MMOL/L — SIGNIFICANT CHANGE UP (ref 5–17)
BUN SERPL-MCNC: 16 MG/DL — SIGNIFICANT CHANGE UP (ref 7–23)
CALCIUM SERPL-MCNC: 8.3 MG/DL — LOW (ref 8.5–10.1)
CHLORIDE SERPL-SCNC: 111 MMOL/L — HIGH (ref 96–108)
CO2 SERPL-SCNC: 28 MMOL/L — SIGNIFICANT CHANGE UP (ref 22–31)
CREAT SERPL-MCNC: 1.02 MG/DL — SIGNIFICANT CHANGE UP (ref 0.5–1.3)
GLUCOSE BLDC GLUCOMTR-MCNC: 87 MG/DL — SIGNIFICANT CHANGE UP (ref 70–99)
GLUCOSE SERPL-MCNC: 88 MG/DL — SIGNIFICANT CHANGE UP (ref 70–99)
HCT VFR BLD CALC: 39.5 % — SIGNIFICANT CHANGE UP (ref 39–50)
HGB BLD-MCNC: 12.5 G/DL — LOW (ref 13–17)
MCHC RBC-ENTMCNC: 28.8 PG — SIGNIFICANT CHANGE UP (ref 27–34)
MCHC RBC-ENTMCNC: 31.6 GM/DL — LOW (ref 32–36)
MCV RBC AUTO: 91 FL — SIGNIFICANT CHANGE UP (ref 80–100)
PLATELET # BLD AUTO: 141 K/UL — LOW (ref 150–400)
POTASSIUM SERPL-MCNC: 3.6 MMOL/L — SIGNIFICANT CHANGE UP (ref 3.5–5.3)
POTASSIUM SERPL-SCNC: 3.6 MMOL/L — SIGNIFICANT CHANGE UP (ref 3.5–5.3)
RBC # BLD: 4.34 M/UL — SIGNIFICANT CHANGE UP (ref 4.2–5.8)
RBC # FLD: 13.5 % — SIGNIFICANT CHANGE UP (ref 10.3–14.5)
SODIUM SERPL-SCNC: 146 MMOL/L — HIGH (ref 135–145)
WBC # BLD: 6.74 K/UL — SIGNIFICANT CHANGE UP (ref 3.8–10.5)
WBC # FLD AUTO: 6.74 K/UL — SIGNIFICANT CHANGE UP (ref 3.8–10.5)

## 2019-07-18 PROCEDURE — 99233 SBSQ HOSP IP/OBS HIGH 50: CPT

## 2019-07-18 PROCEDURE — 99232 SBSQ HOSP IP/OBS MODERATE 35: CPT

## 2019-07-18 PROCEDURE — 74018 RADEX ABDOMEN 1 VIEW: CPT | Mod: 26

## 2019-07-18 RX ORDER — PANTOPRAZOLE SODIUM 20 MG/1
40 TABLET, DELAYED RELEASE ORAL
Refills: 0 | Status: DISCONTINUED | OUTPATIENT
Start: 2019-07-18 | End: 2019-07-19

## 2019-07-18 RX ORDER — SODIUM CHLORIDE 9 MG/ML
1000 INJECTION, SOLUTION INTRAVENOUS
Refills: 0 | Status: DISCONTINUED | OUTPATIENT
Start: 2019-07-18 | End: 2019-07-19

## 2019-07-18 RX ADMIN — FINASTERIDE 5 MILLIGRAM(S): 5 TABLET, FILM COATED ORAL at 14:05

## 2019-07-18 RX ADMIN — PANTOPRAZOLE SODIUM 40 MILLIGRAM(S): 20 TABLET, DELAYED RELEASE ORAL at 18:51

## 2019-07-18 RX ADMIN — HEPARIN SODIUM 5000 UNIT(S): 5000 INJECTION INTRAVENOUS; SUBCUTANEOUS at 04:56

## 2019-07-18 RX ADMIN — CITALOPRAM 20 MILLIGRAM(S): 10 TABLET, FILM COATED ORAL at 14:05

## 2019-07-18 RX ADMIN — PANTOPRAZOLE SODIUM 10 MG/HR: 20 TABLET, DELAYED RELEASE ORAL at 09:03

## 2019-07-18 RX ADMIN — HEPARIN SODIUM 5000 UNIT(S): 5000 INJECTION INTRAVENOUS; SUBCUTANEOUS at 18:52

## 2019-07-18 RX ADMIN — SODIUM CHLORIDE 50 MILLILITER(S): 9 INJECTION, SOLUTION INTRAVENOUS at 18:52

## 2019-07-18 RX ADMIN — ATORVASTATIN CALCIUM 10 MILLIGRAM(S): 80 TABLET, FILM COATED ORAL at 21:39

## 2019-07-18 NOTE — CHART NOTE - NSCHARTNOTEFT_GEN_A_CORE
Discussed case with son in the room, patient placed back on clear liquids at the insistence of the CRC team. Patient KUB is showing improving of colonic distension, patient course of treatment unknown timeframe, not likely he can receive medications to help with motility, however will need to d/w GI. Patient wife attempted to call on phone but got the voicemail, will try again later.

## 2019-07-18 NOTE — PROGRESS NOTE ADULT - ASSESSMENT
73 y/o M with abdominal distension, nausea, vomiting secondary to olgive syndrome, now undergoing therapeutic treatment.     #Colonic Ilieus secondary to parkinson's disease?  -Stable at the moment despite distention, lytes wnl, no nausea/vomiting   -Zofran prn for n/v  -Per GI: Serial exams and KUB. Correct electrolytes. Avoid narcotics. Remain NPO   -Per Colorectal surgery: NPO for now and would recommend rectal tube  -Reviewed CT imaging with in house radiology and they said the largest diameter of the colon was 10.7Cm which is at the threshold of increased perforation.   -Ordered rectal trumpet for decompression and serial KUB's to moniter the progress  -monitor Bowel movements and urine output    #left leg edema (lymphedema) with unknown etiology  - U/S of left leg was negative for DVT    #Parkinsons  -Continue home medications when able to PO again    DVT ppx---SCD's

## 2019-07-19 ENCOUNTER — TRANSCRIPTION ENCOUNTER (OUTPATIENT)
Age: 74
End: 2019-07-19

## 2019-07-19 VITALS
DIASTOLIC BLOOD PRESSURE: 49 MMHG | SYSTOLIC BLOOD PRESSURE: 100 MMHG | OXYGEN SATURATION: 96 % | RESPIRATION RATE: 18 BRPM | TEMPERATURE: 98 F | HEART RATE: 66 BPM

## 2019-07-19 LAB
ANION GAP SERPL CALC-SCNC: 6 MMOL/L — SIGNIFICANT CHANGE UP (ref 5–17)
BUN SERPL-MCNC: 11 MG/DL — SIGNIFICANT CHANGE UP (ref 7–23)
CALCIUM SERPL-MCNC: 7.9 MG/DL — LOW (ref 8.5–10.1)
CHLORIDE SERPL-SCNC: 105 MMOL/L — SIGNIFICANT CHANGE UP (ref 96–108)
CO2 SERPL-SCNC: 28 MMOL/L — SIGNIFICANT CHANGE UP (ref 22–31)
CREAT SERPL-MCNC: 1.03 MG/DL — SIGNIFICANT CHANGE UP (ref 0.5–1.3)
GLUCOSE SERPL-MCNC: 103 MG/DL — HIGH (ref 70–99)
POTASSIUM SERPL-MCNC: 3.3 MMOL/L — LOW (ref 3.5–5.3)
POTASSIUM SERPL-SCNC: 3.3 MMOL/L — LOW (ref 3.5–5.3)
SODIUM SERPL-SCNC: 139 MMOL/L — SIGNIFICANT CHANGE UP (ref 135–145)

## 2019-07-19 PROCEDURE — 74018 RADEX ABDOMEN 1 VIEW: CPT | Mod: 26

## 2019-07-19 PROCEDURE — 99231 SBSQ HOSP IP/OBS SF/LOW 25: CPT

## 2019-07-19 RX ADMIN — HEPARIN SODIUM 5000 UNIT(S): 5000 INJECTION INTRAVENOUS; SUBCUTANEOUS at 06:12

## 2019-07-19 RX ADMIN — CITALOPRAM 20 MILLIGRAM(S): 10 TABLET, FILM COATED ORAL at 12:08

## 2019-07-19 RX ADMIN — PANTOPRAZOLE SODIUM 40 MILLIGRAM(S): 20 TABLET, DELAYED RELEASE ORAL at 06:11

## 2019-07-19 RX ADMIN — FINASTERIDE 5 MILLIGRAM(S): 5 TABLET, FILM COATED ORAL at 12:08

## 2019-07-19 NOTE — PROGRESS NOTE ADULT - ASSESSMENT
75 y/o male with Parkinson's disease with ileus  x-ray noted improvement- no further n/v.  will advance diet now.   ok to d/c home from gi standpoint.   discussed with pt, wife, Dr. Giles/Leandro.

## 2019-07-19 NOTE — PROGRESS NOTE ADULT - PROVIDER SPECIALTY LIST ADULT
Colorectal Surgery
Family Medicine
Gastroenterology
Gastroenterology
Hospitalist
Hospitalist
Family Medicine

## 2019-07-19 NOTE — PROGRESS NOTE ADULT - REASON FOR ADMISSION
intractable nausea and vomiting

## 2019-07-19 NOTE — DISCHARGE NOTE PROVIDER - HOSPITAL COURSE
Patient presented to the ED(7/17) with intractable vomiting. Patient was reported to have a one large bowel movement in the ED at the time of admission. Patient started complaining of abdominal distention at the time of admission and a CT abdominal scan was done.  Abdominal imaging at that time showed the patient had an pseudoobstruction in the sigmoid colon that was severely dilated. Patient was made NPO in the  ED and admitted to the inpatient unit. The colorectal surgery team recommended decompression therapy with a rectal trumpet. Repeat abdominal imaging showed less dilation of the colon and patient was reported to have bowel movement the last two days of hospitalization. The patient's abdominal distention improved significantly after decompression therapy. On the day of discharge the rectal tube was removed and patient tolerated low fiber diet on the day of discharge.  Patient was evaluated on the day(7/19) of discharge.  Patient denied nausea, vomiting, constipation, abdominal pain, fevers, chills. He  reported having a bowel movement before discharge. The patient's PCP was called and updated on the patient's hospital course.         Of note, patient was found to have unilateral swelling on his left leg. Patient reported the swelling had been a chronic issue. A follow up duplex ultrasound of the leg came back negative for a DVT.             Vital Signs Last 24 Hrs    T(C): 36.6 (19 Jul 2019 04:45), Max: 37.1 (18 Jul 2019 11:14)    T(F): 97.9 (19 Jul 2019 04:45), Max: 98.7 (18 Jul 2019 11:14)    HR: 57 (19 Jul 2019 04:45) (54 - 66)    BP: 121/53 (19 Jul 2019 04:45) (95/46 - 125/51)    RR: 18 (19 Jul 2019 04:45) (18 - 19)    SpO2: 95% (19 Jul 2019 04:45) (95% - 100%)            REVIEW OF SYSTEMS:        CONSTITUTIONAL: No weakness, fevers or chills    EYES/ENT: No visual changes;  No vertigo or throat pain     NECK: No pain or stiffness    RESPIRATORY: No cough, wheezing, hemoptysis; No shortness of breath    CARDIOVASCULAR: No chest pain or palpitations    GASTROINTESTINAL: No abdominal or epigastric pain. No nausea, vomiting, or hematemesis; No diarrhea or constipation. No melena or hematochezia.    GENITOURINARY: No dysuria, frequency or hematuria    NEUROLOGICAL: No numbness or weakness    SKIN: No itching, rashes            Vitals    T(F): 97.9 (07-19-19 @ 04:45), Max: 98.7 (07-18-19 @ 11:14)    HR: 57 (07-19-19 @ 04:45) (54 - 66)    BP: 121/53 (07-19-19 @ 04:45) (95/46 - 125/51)    RR: 18 (07-19-19 @ 04:45) (18 - 19)    SpO2: 95% (07-19-19 @ 04:45) (95% - 100%)        Physical Exam     Gen: NAD, comfortable    HENT: atraumatic head and ears, no gross abnormalities of ears, mucous membranes moist, no oral lesions, neck supple without masses/goiter/lymphadenopathy    CV: RRR, nl s1/s2, no M/R/G    Pulm: nl respiratory effort, CTAB, no wheezes/crackles/rhonchi    Back: no scoliosis, lordosis, or kyphosis, no tenderness    Abd: normoactive bowel sounds, soft, nontender, mild distention(improved), no rebound, no guarding, no masses    Extremities: left leg non-pitting edema, pedal pulses palpable     Skin: nl warm and dry, no wounds     Neuro: A&Ox3, answering questions appropriately, PERRL, EOMI, face symmetric, tongue midline, significant dysarthria with bradykinesia,         US Duplex Venous Lower Ext Ltd, Left (07.17.19 @ 14:02) >    IMPRESSION:         No evidence of left lower extremity deep venous thrombosis.            CT Abdomen and Pelvis w/ Oral Cont and w/ IV Cont (07.16.19 @ 17:51) >    IMPRESSION:         No small bowel obstruction.        Diffuse gaseous distention of the colon with greatest degree of     dilatation seen in the sigmoid (severe) without an identifiable twist or     obstructing lesion. No wall thickening, signs of ischemia, or     inflammatory change. Findings are suggestive of colonic ileus.            Xray Abdomen 1 View-Upright Only (07.18.19 @ 11:34) >    IMPRESSION: Marked improvement in colonic distention. Patient presented to the ED(7/17) with intractable vomiting. Patient was reported to have a one large bowel movement in the ED at the time of admission. Patient started complaining of abdominal distention at the time of admission and a CT abdominal scan was done.  Abdominal imaging at that time showed the patient had an pseudoobstruction in the sigmoid colon that was severely dilated. Patient was made NPO in the  ED and admitted to the inpatient unit. The colorectal surgery team recommended decompression therapy with a rectal trumpet. Repeat abdominal imaging showed less dilation of the colon and patient was reported to have bowel movement the last two days of hospitalization. The patient's abdominal distention improved significantly after decompression therapy. On the day of discharge the rectal tube was removed and patient tolerated low fiber diet on the day of discharge. Of note, patient was found to have unilateral swelling on his left leg. Patient reported the swelling had been a chronic issue. A follow up duplex ultrasound of the leg was negative for a DVT. Pt's PCP was called and a message was left with PCP regarding pt's admission.         Patient was evaluated on the day(7/19) of discharge.  Patient denied nausea, vomiting, constipation, abdominal pain, fevers, chills. He  reported having a bowel movement before discharge. The patient's PCP was called and updated on the patient's hospital course.         Vital Signs Last 24 Hrs    T(C): 36.6 (19 Jul 2019 04:45), Max: 37.1 (18 Jul 2019 11:14)    T(F): 97.9 (19 Jul 2019 04:45), Max: 98.7 (18 Jul 2019 11:14)    HR: 57 (19 Jul 2019 04:45) (54 - 66)    BP: 121/53 (19 Jul 2019 04:45) (95/46 - 125/51)    RR: 18 (19 Jul 2019 04:45) (18 - 19)    SpO2: 95% (19 Jul 2019 04:45) (95% - 100%)            REVIEW OF SYSTEMS:        CONSTITUTIONAL: No weakness, fevers or chills    EYES/ENT: No visual changes;  No vertigo or throat pain     NECK: No pain or stiffness    RESPIRATORY: No cough, wheezing, hemoptysis; No shortness of breath    CARDIOVASCULAR: No chest pain or palpitations    GASTROINTESTINAL: No abdominal or epigastric pain. No nausea, vomiting, or hematemesis; No diarrhea or constipation. No melena or hematochezia.    GENITOURINARY: No dysuria, frequency or hematuria    NEUROLOGICAL: No numbness or weakness    SKIN: No itching, rashes            Vitals    T(F): 97.9 (07-19-19 @ 04:45), Max: 98.7 (07-18-19 @ 11:14)    HR: 57 (07-19-19 @ 04:45) (54 - 66)    BP: 121/53 (07-19-19 @ 04:45) (95/46 - 125/51)    RR: 18 (07-19-19 @ 04:45) (18 - 19)    SpO2: 95% (07-19-19 @ 04:45) (95% - 100%)        Physical Exam     Gen: NAD, comfortable    HENT: atraumatic head and ears, no gross abnormalities of ears, mucous membranes moist, no oral lesions, neck supple without masses/goiter/lymphadenopathy    CV: RRR, nl s1/s2, no M/R/G    Pulm: nl respiratory effort, CTAB, no wheezes/crackles/rhonchi    Back: no scoliosis, lordosis, or kyphosis, no tenderness    Abd: normoactive bowel sounds, soft, nontender, mild distention(improved), no rebound, no guarding, no masses    Extremities: left leg non-pitting edema, pedal pulses palpable     Skin: nl warm and dry, no wounds     Neuro: A&Ox3, answering questions appropriately, PERRL, EOMI, face symmetric, tongue midline, significant dysarthria with bradykinesia,         US Duplex Venous Lower Ext Ltd, Left (07.17.19 @ 14:02) >    IMPRESSION:         No evidence of left lower extremity deep venous thrombosis.            CT Abdomen and Pelvis w/ Oral Cont and w/ IV Cont (07.16.19 @ 17:51) >    IMPRESSION:         No small bowel obstruction.        Diffuse gaseous distention of the colon with greatest degree of     dilatation seen in the sigmoid (severe) without an identifiable twist or     obstructing lesion. No wall thickening, signs of ischemia, or     inflammatory change. Findings are suggestive of colonic ileus.            Xray Abdomen 1 View-Upright Only (07.18.19 @ 11:34) >    IMPRESSION: Marked improvement in colonic distention.

## 2019-07-19 NOTE — DISCHARGE NOTE PROVIDER - NSDCCPCAREPLAN_GEN_ALL_CORE_FT
PRINCIPAL DISCHARGE DIAGNOSIS  Diagnosis: Ileus  Assessment and Plan of Treatment: You were found to have a distended colon on admission. You underwent rectal tube decompression therapy that released the trapped air in that area and relieved the distension. The cause of this obstruction was not entirely clear, but this is something that should be looked into if you were to experience this again in the future. You can take over the counter stool softerners to help with defecating in the future.      SECONDARY DISCHARGE DIAGNOSES  Diagnosis: Intractable nausea and vomiting  Assessment and Plan of Treatment: No changes were made to any of your medications. If you begin to experience worsening abdominal distention or uncontrolled vomiting please do not hesitate to return to the ED for therapeuitic relief and diagnostic evaluation.

## 2019-07-19 NOTE — PROGRESS NOTE ADULT - SUBJECTIVE AND OBJECTIVE BOX
CHIEF COMPLAINT: Patient presented to the ED with 3 bouts of intractable vomiting. Patient was feeling well the day before being admitted. The only symptom the patient was complaining at the time was constipation associated with not passing gas for 3 days prior to the vomiting episode. The vomit was brown and foul smelling. Patient denied fevers, chills, abdominal pain. Patient's last bowel movement(pellets) was 1 day ago. Patient was found to have a dilated sigmoid colon on abdominal imaging. Colorectal surg and GI were consulted.       SUBJECTIVE: Patient was seen in bed lying down, Afebile, NAD. Patient abdomen with improvement today, s/p rectal tube insertion. Patient denies still having passed gas, however physically less distended. GI appreciated, Abdominal Xray without air below diaphragm, will keep NPO.    REVIEW OF SYSTEMS:    CONSTITUTIONAL: No weakness, fevers or chills  EYES/ENT: No visual changes;  No vertigo or throat pain   NECK: No pain or stiffness  RESPIRATORY: No cough, wheezing, hemoptysis; No shortness of breath  CARDIOVASCULAR: No chest pain or palpitations  GASTROINTESTINAL: No abdominal or epigastric pain. No nausea, vomiting, or hematemesis; No diarrhea. No constipation(last bowel movement was yesterday). No melena or hematochezia.  GENITOURINARY: No dysuria, frequency or hematuria  NEUROLOGICAL: No numbness or weakness  SKIN: No itching, burning, rashes, or lesions   All other review of systems is negative unless indicated above    Vital Signs Last 24 Hrs  T(C): 37.1 (18 Jul 2019 11:14), Max: 37.1 (18 Jul 2019 11:14)  T(F): 98.7 (18 Jul 2019 11:14), Max: 98.7 (18 Jul 2019 11:14)  HR: 57 (18 Jul 2019 11:14) (57 - 61)  BP: 125/51 (18 Jul 2019 11:14) (113/64 - 134/74)  BP(mean): --  RR: 19 (18 Jul 2019 11:14) (19 - 19)  SpO2: 100% (18 Jul 2019 11:14) (99% - 100%)    POCT Blood Glucose.: 122 mg/dL (17 Jul 2019 09:01)      PHYSICAL EXAM:    Constitutional: NAD, awake and alert, well-developed  HEENT: PERR, EOMI, Normal Hearing, MMM  Neck: Soft and supple, No LAD, No JVD  Respiratory: Breath sounds are clear bilaterally, No wheezing, rales or rhonchi  Cardiovascular: S1 and S2, regular rate and rhythm, no Murmurs, gallops or rubs  Gastrointestinal: hypoactive Bowel Sounds present, soft, nontender, + distension improved, no guarding, no rebound  Extremities: L lower non pitting peripheral edema, pedal pulses present  Vascular: 2+ peripheral pulses  Neurological: A/O x 3, no focal deficits  Musculoskeletal: 5/5 strength b/l upper and lower extremities  Skin: No rashes    MEDICATIONS  (STANDING):  atorvastatin Oral Tab/Cap - Peds 10 milliGRAM(s) Oral daily  citalopram 20 milliGRAM(s) Oral daily  dextrose 5%. 1000 milliLiter(s) (50 mL/Hr) IV Continuous <Continuous>  dextrose 5%. 1000 milliLiter(s) (50 mL/Hr) IV Continuous <Continuous>  dextrose 50% Injectable 12.5 Gram(s) IV Push once  dextrose 50% Injectable 25 Gram(s) IV Push once  dextrose 50% Injectable 25 Gram(s) IV Push once  finasteride 5 milliGRAM(s) Oral daily  heparin  Injectable 5000 Unit(s) SubCutaneous every 12 hours  insulin lispro (HumaLOG) corrective regimen sliding scale   SubCutaneous at bedtime  pantoprazole  Injectable 40 milliGRAM(s) IV Push two times a day    MEDICATIONS  (PRN):  acetaminophen   Tablet .. 650 milliGRAM(s) Oral every 6 hours PRN Temp greater or equal to 38C (100.4F), Mild Pain (1 - 3)  dextrose 40% Gel 15 Gram(s) Oral once PRN Blood Glucose LESS THAN 70 milliGRAM(s)/deciliter  glucagon  Injectable 1 milliGRAM(s) IntraMuscular once PRN Glucose LESS THAN 70 milligrams/deciliter  ondansetron Injectable 4 milliGRAM(s) IV Push every 6 hours PRN Nausea and/or Vomiting        LABS: All Labs Reviewed:                                   12.5   6.74  )-----------( 141      ( 18 Jul 2019 06:23 )             39.5   07-18    146<H>  |  111<H>  |  16  ----------------------------<  88  3.6   |  28  |  1.02    Ca    8.3<L>      18 Jul 2019 06:23  Mg     2.5     07-16    TPro  7.6  /  Alb  3.1<L>  /  TBili  0.8  /  DBili  x   /  AST  11<L>  /  ALT  13  /  AlkPhos  103  07-17        < from: US Duplex Venous Lower Ext Ltd, Left (07.17.19 @ 14:02) >  IMPRESSION:     No evidence of left lower extremity deep venous thrombosis.      < from: CT Abdomen and Pelvis w/ Oral Cont and w/ IV Cont (07.16.19 @ 17:51) >  IMPRESSION:     No small bowel obstruction.    Diffuse gaseous distention of the colon with greatest degree of   dilatation seen in the sigmoid (severe) without an identifiable twist or   obstructing lesion. No wall thickening, signs of ischemia, or   inflammatory change. Findings are suggestive of colonic ileus.
CHIEF COMPLAINT: Patient presented to the ED with 3 bouts of intractable vomiting. Patient was feeling well the day before being admitted. The only symptom the patient was complaining at the time was constipation associated with not passing gas for 3 days prior to the vomiting episode. The vomit was brown and foul smelling. Patient denied fevers, chills, abdominal pain. Patient's last bowel movement(pellets) was 1 day ago. Patient was found to have a dilated sigmoid colon on abdominal imaging. Colorectal surg and GI were consulted.       SUBJECTIVE: Patient was seen in bed lying down. Patient denied abdominal pain but endorsed worsening of the abdominal distention. Patient denied nausea and vomiting.   Of Note, the patient's RN noticed a left leg swelling on the patient. Patient said this was a chronic issue.     REVIEW OF SYSTEMS:    CONSTITUTIONAL: No weakness, fevers or chills  EYES/ENT: No visual changes;  No vertigo or throat pain   NECK: No pain or stiffness  RESPIRATORY: No cough, wheezing, hemoptysis; No shortness of breath  CARDIOVASCULAR: No chest pain or palpitations  GASTROINTESTINAL: No abdominal or epigastric pain. No nausea, vomiting, or hematemesis; No diarrhea. No constipation(last bowel movement was yesterday). No melena or hematochezia.  GENITOURINARY: No dysuria, frequency or hematuria  NEUROLOGICAL: No numbness or weakness  SKIN: No itching, burning, rashes, or lesions   All other review of systems is negative unless indicated above    Vital Signs Last 24 Hrs  T(C): 36.8 (17 Jul 2019 11:01), Max: 37.2 (16 Jul 2019 15:22)  T(F): 98.2 (17 Jul 2019 11:01), Max: 99 (16 Jul 2019 15:22)  HR: 66 (17 Jul 2019 11:01) (66 - 81)  BP: 121/68 (17 Jul 2019 11:01) (106/74 - 148/92)  RR: 18 (17 Jul 2019 11:01) (16 - 18)  SpO2: 97% (17 Jul 2019 11:01) (94% - 97%)    POCT Blood Glucose.: 122 mg/dL (17 Jul 2019 09:01)      PHYSICAL EXAM:    Constitutional: NAD, awake and alert, well-developed  HEENT: PERR, EOMI, Normal Hearing, MMM  Neck: Soft and supple, No LAD, No JVD  Respiratory: Breath sounds are clear bilaterally, No wheezing, rales or rhonchi  Cardiovascular: S1 and S2, regular rate and rhythm, no Murmurs, gallops or rubs  Gastrointestinal: hypoactive Bowel Sounds present, soft, nontender, + distended, no guarding, no rebound  Extremities: L lower non pitting peripheral edema, pedal pulses present  Vascular: 2+ peripheral pulses  Neurological: A/O x 3, no focal deficits  Musculoskeletal: 5/5 strength b/l upper and lower extremities  Skin: No rashes    MEDICATIONS:  MEDICATIONS  (STANDING):  atorvastatin Oral Tab/Cap - Peds 10 milliGRAM(s) Oral daily  citalopram 20 milliGRAM(s) Oral daily  dextrose 5%. 1000 milliLiter(s) (50 mL/Hr) IV Continuous <Continuous>  dextrose 50% Injectable 12.5 Gram(s) IV Push once  dextrose 50% Injectable 25 Gram(s) IV Push once  dextrose 50% Injectable 25 Gram(s) IV Push once  finasteride 5 milliGRAM(s) Oral daily  heparin  Injectable 5000 Unit(s) SubCutaneous every 12 hours  insulin lispro (HumaLOG) corrective regimen sliding scale   SubCutaneous at bedtime  pantoprazole Infusion 8 mG/Hr (10 mL/Hr) IV Continuous <Continuous>  sodium chloride 0.9%. 1000 milliLiter(s) (50 mL/Hr) IV Continuous <Continuous>      LABS: All Labs Reviewed:                        12.2   11.56 )-----------( 164      ( 17 Jul 2019 08:55 )             38.0     07-17    142  |  108  |  22  ----------------------------<  120<H>  3.5   |  27  |  1.04    Ca    8.5      17 Jul 2019 08:55  Mg     2.5     07-16    TPro  7.6  /  Alb  3.1<L>  /  TBili  0.8  /  DBili  x   /  AST  11<L>  /  ALT  13  /  AlkPhos  103  07-17    PT/INR - ( 16 Jul 2019 16:05 )   PT: 12.0 sec;   INR: 1.08 ratio         PTT - ( 16 Jul 2019 16:05 )  PTT:27.7 sec  < from: US Duplex Venous Lower Ext Ltd, Left (07.17.19 @ 14:02) >  IMPRESSION:     No evidence of left lower extremity deep venous thrombosis.      < from: CT Abdomen and Pelvis w/ Oral Cont and w/ IV Cont (07.16.19 @ 17:51) >  IMPRESSION:     No small bowel obstruction.    Diffuse gaseous distention of the colon with greatest degree of   dilatation seen in the sigmoid (severe) without an identifiable twist or   obstructing lesion. No wall thickening, signs of ischemia, or   inflammatory change. Findings are suggestive of colonic ileus.
Patient is a 74y old  Male who presents with a chief complaint of intractable nausea and vomiting (17 Jul 2019 14:33)      HPI:  pt still feels distention  no significant change but denies severe pain    PAST MEDICAL & SURGICAL HISTORY:  Irritable bowel syndrome with constipation and diarrhea  Parkinsons disease  MS (multiple sclerosis)  S/P placement of cardiac pacemaker  History of brain surgery: for stimulator. 1997,1998    MEDICATIONS  (STANDING):  atorvastatin Oral Tab/Cap - Peds 10 milliGRAM(s) Oral daily  citalopram 20 milliGRAM(s) Oral daily  dextrose 5%. 1000 milliLiter(s) (50 mL/Hr) IV Continuous <Continuous>  dextrose 50% Injectable 12.5 Gram(s) IV Push once  dextrose 50% Injectable 25 Gram(s) IV Push once  dextrose 50% Injectable 25 Gram(s) IV Push once  finasteride 5 milliGRAM(s) Oral daily  heparin  Injectable 5000 Unit(s) SubCutaneous every 12 hours  insulin lispro (HumaLOG) corrective regimen sliding scale   SubCutaneous at bedtime  pantoprazole Infusion 8 mG/Hr (10 mL/Hr) IV Continuous <Continuous>  sodium chloride 0.9%. 1000 milliLiter(s) (50 mL/Hr) IV Continuous <Continuous>    MEDICATIONS  (PRN):  acetaminophen   Tablet .. 650 milliGRAM(s) Oral every 6 hours PRN Temp greater or equal to 38C (100.4F), Mild Pain (1 - 3)  dextrose 40% Gel 15 Gram(s) Oral once PRN Blood Glucose LESS THAN 70 milliGRAM(s)/deciliter  glucagon  Injectable 1 milliGRAM(s) IntraMuscular once PRN Glucose LESS THAN 70 milligrams/deciliter  ondansetron Injectable 4 milliGRAM(s) IV Push every 6 hours PRN Nausea and/or Vomiting    Allergies  Pineapple (Unknown)  sulfa drugs (Unknown)    REVIEW OF SYSTEMS:    CONSTITUTIONAL: weakness  RESPIRATORY: No SOB  CARDIOVASCULAR: No chest pain or palpitations  GASTROINTESTINAL: as above  All other review of systems is negative unless indicated above.    Vital Signs Last 24 Hrs  T(C): 36.9 (18 Jul 2019 04:54), Max: 36.9 (17 Jul 2019 17:26)  T(F): 98.4 (18 Jul 2019 04:54), Max: 98.4 (17 Jul 2019 17:26)  HR: 58 (18 Jul 2019 04:54) (58 - 66)  BP: 113/64 (18 Jul 2019 04:54) (113/64 - 134/74)  BP(mean): --  RR: 19 (18 Jul 2019 04:54) (18 - 19)  SpO2: 99% (18 Jul 2019 04:54) (97% - 99%)    PHYSICAL EXAM:    Constitutional: NAD, appears chronically ill  Respiratory: CTAB  Cardiovascular: S1 and S2  Gastrointestinal: hypoactive BS, softly distended      LABS:                        12.5   6.74  )-----------( 141      ( 18 Jul 2019 06:23 )             39.5     07-18    146<H>  |  111<H>  |  16  ----------------------------<  88  3.6   |  28  |  1.02    Ca    8.3<L>      18 Jul 2019 06:23  Mg     2.5     07-16    TPro  7.6  /  Alb  3.1<L>  /  TBili  0.8  /  DBili  x   /  AST  11<L>  /  ALT  13  /  AlkPhos  103  07-17    PT/INR - ( 16 Jul 2019 16:05 )   PT: 12.0 sec;   INR: 1.08 ratio         PTT - ( 16 Jul 2019 16:05 )  PTT:27.7 sec  LIVER FUNCTIONS - ( 17 Jul 2019 08:55 )  Alb: 3.1 g/dL / Pro: 7.6 gm/dL / ALK PHOS: 103 U/L / ALT: 13 U/L / AST: 11 U/L / GGT: x             RADIOLOGY & ADDITIONAL STUDIES:
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 Associated with hiccups. He did not eat breakfast and then vomited approximately 200 ccs of brown liquid. No abdominal pain, diarrhea, chest pain, SOB or acute weakness. Vitals remained stable. Denies any new foods, new medications, recent travel or sick contacts. He did not take any medicine for the vomiting. No recent abdominal surgeries. (16 Jul 2019 23:29)    7/19/2019-  Pt doing well, no complaints.   Tolerating fulls.   Wants to be d/mya.   No abdominal pain, n/v.     PAST MEDICAL & SURGICAL HISTORY:  Irritable bowel syndrome with constipation and diarrhea  Parkinsons disease  MS (multiple sclerosis)  S/P placement of cardiac pacemaker  History of brain surgery: for stimulator. 1997,1998    MEDICATIONS  (STANDING):  atorvastatin Oral Tab/Cap - Peds 10 milliGRAM(s) Oral daily  citalopram 20 milliGRAM(s) Oral daily  dextrose 5%. 1000 milliLiter(s) (50 mL/Hr) IV Continuous <Continuous>  finasteride 5 milliGRAM(s) Oral daily  heparin  Injectable 5000 Unit(s) SubCutaneous every 12 hours  pantoprazole  Injectable 40 milliGRAM(s) IV Push two times a day    MEDICATIONS  (PRN):  acetaminophen   Tablet .. 650 milliGRAM(s) Oral every 6 hours PRN Temp greater or equal to 38C (100.4F), Mild Pain (1 - 3)  glucagon  Injectable 1 milliGRAM(s) IntraMuscular once PRN Glucose LESS THAN 70 milligrams/deciliter  ondansetron Injectable 4 milliGRAM(s) IV Push every 6 hours PRN Nausea and/or Vomiting    Allergies  Pineapple (Unknown)  sulfa drugs (Unknown)    FAMILY HISTORY:  FHx: hypertension: father    REVIEW OF SYSTEMS:  CONSTITUTIONAL: No weakness, fevers or chills  RESPIRATORY: No cough, wheezing, hemoptysis; No shortness of breath  CARDIOVASCULAR: No chest pain or palpitations  GASTROINTESTINAL: Per HPI.     Vital Signs Last 24 Hrs  T(C): 36.6 (19 Jul 2019 04:45), Max: 37.1 (18 Jul 2019 11:14)  T(F): 97.9 (19 Jul 2019 04:45), Max: 98.7 (18 Jul 2019 11:14)  HR: 57 (19 Jul 2019 04:45) (54 - 66)  BP: 121/53 (19 Jul 2019 04:45) (95/46 - 125/51)  BP(mean): --  RR: 18 (19 Jul 2019 04:45) (18 - 19)  SpO2: 95% (19 Jul 2019 04:45) (95% - 100%)    PHYSICAL EXAM:  Constitutional: elderly male with multiple medical issues in nad.   Respiratory: CTAB  Cardiovascular: S1 and S2, RRR, no M/G/R  Gastrointestinal: BS+, softly distended, NT    LABS:                        12.5   6.74  )-----------( 141      ( 18 Jul 2019 06:23 )             39.5     07-19    139  |  105  |  11  ----------------------------<  103<H>  3.3<L>   |  28  |  1.03    Ca    7.9<L>      19 Jul 2019 07:54            RADIOLOGY & ADDITIONAL STUDIES:
Pt has been seen and examined with FP resident, resident supervised agree with a/p       Patient is a 74y old  Male who presents with a chief complaint of intractable nausea and vomiting (18 Jul 2019 08:19)        PHYSICAL EXAM:  Vital Signs Last 24 Hrs  T(C): 37.1 (18 Jul 2019 11:14), Max: 37.1 (18 Jul 2019 11:14)  T(F): 98.7 (18 Jul 2019 11:14), Max: 98.7 (18 Jul 2019 11:14)  HR: 57 (18 Jul 2019 11:14) (57 - 61)  BP: 125/51 (18 Jul 2019 11:14) (113/64 - 134/74)  BP(mean): --  RR: 19 (18 Jul 2019 11:14) (19 - 19)  SpO2: 100% (18 Jul 2019 11:14) (99% - 100%)  general- comfortable   -rs-aeeb,cta  -cvs-s1s2 normal   -p/a-soft,bs+  -extremity- no asymmetrical swelling noted           A/P    #ct supportive care, clinically has improved, follow up xray .Rest of management as per Dr. Herman and Surgical team     #Hypernatremia - change NS to Dextrose    #change PPI drip and iv ppi
Pt has been seen and examined with FP resident, resident supervised agree with a/p       Patient is a 74y old  Male who presents with a chief complaint of intractable nausea and vomiting (19 Jul 2019 10:01)        PHYSICAL EXAM:  Vital Signs Last 24 Hrs  T(C): 36.7 (19 Jul 2019 11:12), Max: 36.9 (18 Jul 2019 21:45)  T(F): 98 (19 Jul 2019 11:12), Max: 98.5 (18 Jul 2019 21:45)  HR: 66 (19 Jul 2019 11:12) (54 - 66  BP: 100/49 (19 Jul 2019 11:12) (95/46 - 121/53)  BP(mean): --  RR: 18 (19 Jul 2019 11:12) (18 - 18)  SpO2: 96% (19 Jul 2019 11:12) (95% - 99%)  general- comfortable   -rs-aeeb,cta  -cvs-s1s2 normal   -p/a-soft,bs+        A/P    #d/c today, time spent 55 minutes
passing flatus and had bm  denies n/v    Vital Signs Last 24 Hrs  T(C): 37.1 (18 Jul 2019 11:14), Max: 37.1 (18 Jul 2019 11:14)  T(F): 98.7 (18 Jul 2019 11:14), Max: 98.7 (18 Jul 2019 11:14)  HR: 57 (18 Jul 2019 11:14) (57 - 61)  BP: 125/51 (18 Jul 2019 11:14) (113/64 - 134/74)  BP(mean): --  RR: 19 (18 Jul 2019 11:14) (19 - 19)  SpO2: 100% (18 Jul 2019 11:14) (99% - 100%)    abd soft, non distended                          12.5   6.74  )-----------( 141      ( 18 Jul 2019 06:23 )             39.5   07-18    146<H>  |  111<H>  |  16  ----------------------------<  88  3.6   |  28  |  1.02    Ca    8.3<L>      18 Jul 2019 06:23  Mg     2.5     07-16    TPro  7.6  /  Alb  3.1<L>  /  TBili  0.8  /  DBili  x   /  AST  11<L>  /  ALT  13  /  AlkPhos  103  07-17

## 2019-07-19 NOTE — DISCHARGE NOTE NURSING/CASE MANAGEMENT/SOCIAL WORK - NSDCDPATPORTLINK_GEN_ALL_CORE
You can access the [facility] Portal, offered by [facility], by registering with the following website: [email or web address]

## 2019-07-19 NOTE — DISCHARGE NOTE PROVIDER - PROVIDER TOKENS
FREE:[LAST:[Anton],FIRST:[Estela],PHONE:[phone]],FAX:[(   [phone]    -],ADDRESS:[[address] [place] [address]],FOLLOWUP:[1 week]]

## 2019-07-19 NOTE — DISCHARGE NOTE PROVIDER - CARE PROVIDER_API CALL
Estela Walton  [address] , [place] [address]  Phone: [phone]  Fax: (   )    -  Follow Up Time: 1 week

## 2019-07-24 DIAGNOSIS — K56.7 ILEUS, UNSPECIFIED: ICD-10-CM

## 2019-07-24 DIAGNOSIS — E87.0 HYPEROSMOLALITY AND HYPERNATREMIA: ICD-10-CM

## 2019-07-24 DIAGNOSIS — G35 MULTIPLE SCLEROSIS: ICD-10-CM

## 2019-07-24 DIAGNOSIS — K29.71 GASTRITIS, UNSPECIFIED, WITH BLEEDING: ICD-10-CM

## 2019-07-24 DIAGNOSIS — G20 PARKINSON'S DISEASE: ICD-10-CM

## 2019-07-24 DIAGNOSIS — Z95.0 PRESENCE OF CARDIAC PACEMAKER: ICD-10-CM

## 2020-01-01 ENCOUNTER — RESULT REVIEW (OUTPATIENT)
Age: 75
End: 2020-01-01

## 2020-01-01 ENCOUNTER — TRANSCRIPTION ENCOUNTER (OUTPATIENT)
Age: 75
End: 2020-01-01

## 2020-01-01 ENCOUNTER — INPATIENT (INPATIENT)
Facility: HOSPITAL | Age: 75
LOS: 4 days | Discharge: NURSING, REHAB OR LONG-TERM CARE | DRG: 378 | End: 2020-02-21
Attending: SURGERY | Admitting: SURGERY
Payer: MEDICARE

## 2020-01-01 ENCOUNTER — APPOINTMENT (OUTPATIENT)
Dept: ELECTROPHYSIOLOGY | Facility: CLINIC | Age: 75
End: 2020-01-01

## 2020-01-01 ENCOUNTER — APPOINTMENT (OUTPATIENT)
Dept: ELECTROPHYSIOLOGY | Facility: CLINIC | Age: 75
End: 2020-01-01
Payer: MEDICARE

## 2020-01-01 ENCOUNTER — INPATIENT (INPATIENT)
Facility: HOSPITAL | Age: 75
LOS: 0 days | DRG: 871 | End: 2020-12-12
Attending: SURGERY | Admitting: SURGERY
Payer: MEDICARE

## 2020-01-01 ENCOUNTER — INPATIENT (INPATIENT)
Facility: HOSPITAL | Age: 75
LOS: 3 days | Discharge: NURSING, REHAB OR LONG-TERM CARE | DRG: 378 | End: 2020-06-23
Attending: SURGERY | Admitting: SURGERY
Payer: MEDICARE

## 2020-01-01 VITALS
RESPIRATION RATE: 18 BRPM | OXYGEN SATURATION: 98 % | DIASTOLIC BLOOD PRESSURE: 65 MMHG | HEART RATE: 60 BPM | SYSTOLIC BLOOD PRESSURE: 134 MMHG | TEMPERATURE: 98 F

## 2020-01-01 VITALS
RESPIRATION RATE: 18 BRPM | HEART RATE: 69 BPM | OXYGEN SATURATION: 97 % | TEMPERATURE: 98 F | SYSTOLIC BLOOD PRESSURE: 107 MMHG | DIASTOLIC BLOOD PRESSURE: 48 MMHG

## 2020-01-01 VITALS
TEMPERATURE: 98 F | RESPIRATION RATE: 16 BRPM | HEART RATE: 101 BPM | WEIGHT: 276.02 LBS | SYSTOLIC BLOOD PRESSURE: 138 MMHG | OXYGEN SATURATION: 99 % | DIASTOLIC BLOOD PRESSURE: 90 MMHG

## 2020-01-01 VITALS — HEIGHT: 72 IN | WEIGHT: 270.07 LBS

## 2020-01-01 VITALS — HEIGHT: 72 IN

## 2020-01-01 VITALS — HEART RATE: 70 BPM

## 2020-01-01 DIAGNOSIS — Z95.0 PRESENCE OF CARDIAC PACEMAKER: ICD-10-CM

## 2020-01-01 DIAGNOSIS — K58.9 IRRITABLE BOWEL SYNDROME WITHOUT DIARRHEA: ICD-10-CM

## 2020-01-01 DIAGNOSIS — Z98.890 OTHER SPECIFIED POSTPROCEDURAL STATES: Chronic | ICD-10-CM

## 2020-01-01 DIAGNOSIS — Z66 DO NOT RESUSCITATE: ICD-10-CM

## 2020-01-01 DIAGNOSIS — G35 MULTIPLE SCLEROSIS: ICD-10-CM

## 2020-01-01 DIAGNOSIS — Z95.0 PRESENCE OF CARDIAC PACEMAKER: Chronic | ICD-10-CM

## 2020-01-01 DIAGNOSIS — R00.1 BRADYCARDIA, UNSPECIFIED: ICD-10-CM

## 2020-01-01 DIAGNOSIS — E87.2 ACIDOSIS: ICD-10-CM

## 2020-01-01 DIAGNOSIS — K29.61 OTHER GASTRITIS WITH BLEEDING: ICD-10-CM

## 2020-01-01 DIAGNOSIS — K25.5 CHRONIC OR UNSPECIFIED GASTRIC ULCER WITH PERFORATION: ICD-10-CM

## 2020-01-01 DIAGNOSIS — R06.03 ACUTE RESPIRATORY DISTRESS: ICD-10-CM

## 2020-01-01 DIAGNOSIS — K29.60 OTHER GASTRITIS WITHOUT BLEEDING: ICD-10-CM

## 2020-01-01 DIAGNOSIS — K66.8 OTHER SPECIFIED DISORDERS OF PERITONEUM: ICD-10-CM

## 2020-01-01 DIAGNOSIS — E78.5 HYPERLIPIDEMIA, UNSPECIFIED: ICD-10-CM

## 2020-01-01 DIAGNOSIS — R14.0 ABDOMINAL DISTENSION (GASEOUS): ICD-10-CM

## 2020-01-01 DIAGNOSIS — Z96.82 PRESENCE OF NEUROSTIMULATOR: ICD-10-CM

## 2020-01-01 DIAGNOSIS — K56.699 OTHER INTESTINAL OBSTRUCTION UNSPECIFIED AS TO PARTIAL VERSUS COMPLETE OBSTRUCTION: ICD-10-CM

## 2020-01-01 DIAGNOSIS — R47.82 FLUENCY DISORDER IN CONDITIONS CLASSIFIED ELSEWHERE: ICD-10-CM

## 2020-01-01 DIAGNOSIS — Z88.2 ALLERGY STATUS TO SULFONAMIDES: ICD-10-CM

## 2020-01-01 DIAGNOSIS — G20 PARKINSON'S DISEASE: ICD-10-CM

## 2020-01-01 DIAGNOSIS — N17.9 ACUTE KIDNEY FAILURE, UNSPECIFIED: ICD-10-CM

## 2020-01-01 DIAGNOSIS — E87.6 HYPOKALEMIA: ICD-10-CM

## 2020-01-01 DIAGNOSIS — J96.01 ACUTE RESPIRATORY FAILURE WITH HYPOXIA: ICD-10-CM

## 2020-01-01 DIAGNOSIS — K63.89 OTHER SPECIFIED DISEASES OF INTESTINE: ICD-10-CM

## 2020-01-01 DIAGNOSIS — Z91.018 ALLERGY TO OTHER FOODS: ICD-10-CM

## 2020-01-01 DIAGNOSIS — E83.39 OTHER DISORDERS OF PHOSPHORUS METABOLISM: ICD-10-CM

## 2020-01-01 DIAGNOSIS — K56.41 FECAL IMPACTION: ICD-10-CM

## 2020-01-01 DIAGNOSIS — K92.0 HEMATEMESIS: ICD-10-CM

## 2020-01-01 DIAGNOSIS — Z71.89 OTHER SPECIFIED COUNSELING: ICD-10-CM

## 2020-01-01 DIAGNOSIS — K59.00 CONSTIPATION, UNSPECIFIED: ICD-10-CM

## 2020-01-01 DIAGNOSIS — E86.0 DEHYDRATION: ICD-10-CM

## 2020-01-01 DIAGNOSIS — I48.91 UNSPECIFIED ATRIAL FIBRILLATION: ICD-10-CM

## 2020-01-01 DIAGNOSIS — A41.9 SEPSIS, UNSPECIFIED ORGANISM: ICD-10-CM

## 2020-01-01 DIAGNOSIS — E87.0 HYPEROSMOLALITY AND HYPERNATREMIA: ICD-10-CM

## 2020-01-01 LAB
ADD ON TEST-SPECIMEN IN LAB: SIGNIFICANT CHANGE UP
ALBUMIN SERPL ELPH-MCNC: 2.7 G/DL — LOW (ref 3.3–5)
ALBUMIN SERPL ELPH-MCNC: 2.9 G/DL — LOW (ref 3.3–5)
ALBUMIN SERPL ELPH-MCNC: 3.2 G/DL — LOW (ref 3.3–5)
ALBUMIN SERPL ELPH-MCNC: 3.4 G/DL — SIGNIFICANT CHANGE UP (ref 3.3–5)
ALP SERPL-CCNC: 116 U/L — SIGNIFICANT CHANGE UP (ref 40–120)
ALP SERPL-CCNC: 70 U/L — SIGNIFICANT CHANGE UP (ref 40–120)
ALP SERPL-CCNC: 79 U/L — SIGNIFICANT CHANGE UP (ref 40–120)
ALP SERPL-CCNC: 92 U/L — SIGNIFICANT CHANGE UP (ref 40–120)
ALT FLD-CCNC: 15 U/L — SIGNIFICANT CHANGE UP (ref 12–78)
ALT FLD-CCNC: 16 U/L — SIGNIFICANT CHANGE UP (ref 12–78)
ALT FLD-CCNC: 18 U/L — SIGNIFICANT CHANGE UP (ref 12–78)
ALT FLD-CCNC: 21 U/L — SIGNIFICANT CHANGE UP (ref 12–78)
ANION GAP SERPL CALC-SCNC: 11 MMOL/L — SIGNIFICANT CHANGE UP (ref 5–17)
ANION GAP SERPL CALC-SCNC: 14 MMOL/L — SIGNIFICANT CHANGE UP (ref 5–17)
ANION GAP SERPL CALC-SCNC: 3 MMOL/L — LOW (ref 5–17)
ANION GAP SERPL CALC-SCNC: 4 MMOL/L — LOW (ref 5–17)
ANION GAP SERPL CALC-SCNC: 5 MMOL/L — SIGNIFICANT CHANGE UP (ref 5–17)
ANION GAP SERPL CALC-SCNC: 6 MMOL/L — SIGNIFICANT CHANGE UP (ref 5–17)
ANION GAP SERPL CALC-SCNC: 6 MMOL/L — SIGNIFICANT CHANGE UP (ref 5–17)
ANION GAP SERPL CALC-SCNC: 7 MMOL/L — SIGNIFICANT CHANGE UP (ref 5–17)
APTT BLD: 24.7 SEC — LOW (ref 27.5–36.3)
APTT BLD: 25.7 SEC — LOW (ref 27.5–36.3)
APTT BLD: 29.2 SEC — SIGNIFICANT CHANGE UP (ref 27.5–36.3)
AST SERPL-CCNC: 24 U/L — SIGNIFICANT CHANGE UP (ref 15–37)
AST SERPL-CCNC: 29 U/L — SIGNIFICANT CHANGE UP (ref 15–37)
AST SERPL-CCNC: 35 U/L — SIGNIFICANT CHANGE UP (ref 15–37)
AST SERPL-CCNC: 55 U/L — HIGH (ref 15–37)
BASOPHILS # BLD AUTO: 0.01 K/UL — SIGNIFICANT CHANGE UP (ref 0–0.2)
BASOPHILS # BLD AUTO: 0.02 K/UL — SIGNIFICANT CHANGE UP (ref 0–0.2)
BASOPHILS # BLD AUTO: 0.03 K/UL — SIGNIFICANT CHANGE UP (ref 0–0.2)
BASOPHILS # BLD AUTO: 0.03 K/UL — SIGNIFICANT CHANGE UP (ref 0–0.2)
BASOPHILS NFR BLD AUTO: 0.1 % — SIGNIFICANT CHANGE UP (ref 0–2)
BASOPHILS NFR BLD AUTO: 0.2 % — SIGNIFICANT CHANGE UP (ref 0–2)
BILIRUB SERPL-MCNC: 0.5 MG/DL — SIGNIFICANT CHANGE UP (ref 0.2–1.2)
BILIRUB SERPL-MCNC: 0.8 MG/DL — SIGNIFICANT CHANGE UP (ref 0.2–1.2)
BILIRUB SERPL-MCNC: 0.9 MG/DL — SIGNIFICANT CHANGE UP (ref 0.2–1.2)
BILIRUB SERPL-MCNC: 1 MG/DL — SIGNIFICANT CHANGE UP (ref 0.2–1.2)
BUN SERPL-MCNC: 10 MG/DL — SIGNIFICANT CHANGE UP (ref 7–23)
BUN SERPL-MCNC: 14 MG/DL — SIGNIFICANT CHANGE UP (ref 7–23)
BUN SERPL-MCNC: 15 MG/DL — SIGNIFICANT CHANGE UP (ref 7–23)
BUN SERPL-MCNC: 19 MG/DL — SIGNIFICANT CHANGE UP (ref 7–23)
BUN SERPL-MCNC: 21 MG/DL — SIGNIFICANT CHANGE UP (ref 7–23)
BUN SERPL-MCNC: 21 MG/DL — SIGNIFICANT CHANGE UP (ref 7–23)
BUN SERPL-MCNC: 29 MG/DL — HIGH (ref 7–23)
BUN SERPL-MCNC: 4 MG/DL — LOW (ref 7–23)
BUN SERPL-MCNC: 5 MG/DL — LOW (ref 7–23)
BUN SERPL-MCNC: 6 MG/DL — LOW (ref 7–23)
BUN SERPL-MCNC: 8 MG/DL — SIGNIFICANT CHANGE UP (ref 7–23)
BUN SERPL-MCNC: 9 MG/DL — SIGNIFICANT CHANGE UP (ref 7–23)
CALCIUM SERPL-MCNC: 7.6 MG/DL — LOW (ref 8.5–10.1)
CALCIUM SERPL-MCNC: 7.7 MG/DL — LOW (ref 8.5–10.1)
CALCIUM SERPL-MCNC: 7.9 MG/DL — LOW (ref 8.5–10.1)
CALCIUM SERPL-MCNC: 7.9 MG/DL — LOW (ref 8.5–10.1)
CALCIUM SERPL-MCNC: 8 MG/DL — LOW (ref 8.5–10.1)
CALCIUM SERPL-MCNC: 8.1 MG/DL — LOW (ref 8.5–10.1)
CALCIUM SERPL-MCNC: 8.3 MG/DL — LOW (ref 8.5–10.1)
CALCIUM SERPL-MCNC: 8.4 MG/DL — LOW (ref 8.5–10.1)
CALCIUM SERPL-MCNC: 9 MG/DL — SIGNIFICANT CHANGE UP (ref 8.5–10.1)
CALCIUM SERPL-MCNC: 9.6 MG/DL — SIGNIFICANT CHANGE UP (ref 8.5–10.1)
CHLORIDE SERPL-SCNC: 104 MMOL/L — SIGNIFICANT CHANGE UP (ref 96–108)
CHLORIDE SERPL-SCNC: 110 MMOL/L — HIGH (ref 96–108)
CHLORIDE SERPL-SCNC: 110 MMOL/L — HIGH (ref 96–108)
CHLORIDE SERPL-SCNC: 111 MMOL/L — HIGH (ref 96–108)
CHLORIDE SERPL-SCNC: 113 MMOL/L — HIGH (ref 96–108)
CHLORIDE SERPL-SCNC: 114 MMOL/L — HIGH (ref 96–108)
CHLORIDE SERPL-SCNC: 115 MMOL/L — HIGH (ref 96–108)
CHLORIDE SERPL-SCNC: 115 MMOL/L — HIGH (ref 96–108)
CHLORIDE SERPL-SCNC: 116 MMOL/L — HIGH (ref 96–108)
CO2 SERPL-SCNC: 19 MMOL/L — LOW (ref 22–31)
CO2 SERPL-SCNC: 22 MMOL/L — SIGNIFICANT CHANGE UP (ref 22–31)
CO2 SERPL-SCNC: 23 MMOL/L — SIGNIFICANT CHANGE UP (ref 22–31)
CO2 SERPL-SCNC: 23 MMOL/L — SIGNIFICANT CHANGE UP (ref 22–31)
CO2 SERPL-SCNC: 24 MMOL/L — SIGNIFICANT CHANGE UP (ref 22–31)
CO2 SERPL-SCNC: 25 MMOL/L — SIGNIFICANT CHANGE UP (ref 22–31)
CO2 SERPL-SCNC: 25 MMOL/L — SIGNIFICANT CHANGE UP (ref 22–31)
CO2 SERPL-SCNC: 26 MMOL/L — SIGNIFICANT CHANGE UP (ref 22–31)
CO2 SERPL-SCNC: 27 MMOL/L — SIGNIFICANT CHANGE UP (ref 22–31)
CREAT SERPL-MCNC: 0.84 MG/DL — SIGNIFICANT CHANGE UP (ref 0.5–1.3)
CREAT SERPL-MCNC: 0.9 MG/DL — SIGNIFICANT CHANGE UP (ref 0.5–1.3)
CREAT SERPL-MCNC: 0.95 MG/DL — SIGNIFICANT CHANGE UP (ref 0.5–1.3)
CREAT SERPL-MCNC: 0.96 MG/DL — SIGNIFICANT CHANGE UP (ref 0.5–1.3)
CREAT SERPL-MCNC: 0.99 MG/DL — SIGNIFICANT CHANGE UP (ref 0.5–1.3)
CREAT SERPL-MCNC: 1.01 MG/DL — SIGNIFICANT CHANGE UP (ref 0.5–1.3)
CREAT SERPL-MCNC: 1.07 MG/DL — SIGNIFICANT CHANGE UP (ref 0.5–1.3)
CREAT SERPL-MCNC: 1.09 MG/DL — SIGNIFICANT CHANGE UP (ref 0.5–1.3)
CREAT SERPL-MCNC: 1.16 MG/DL — SIGNIFICANT CHANGE UP (ref 0.5–1.3)
CREAT SERPL-MCNC: 1.17 MG/DL — SIGNIFICANT CHANGE UP (ref 0.5–1.3)
CREAT SERPL-MCNC: 1.21 MG/DL — SIGNIFICANT CHANGE UP (ref 0.5–1.3)
CREAT SERPL-MCNC: 1.85 MG/DL — HIGH (ref 0.5–1.3)
CULTURE RESULTS: SIGNIFICANT CHANGE UP
EOSINOPHIL # BLD AUTO: 0 K/UL — SIGNIFICANT CHANGE UP (ref 0–0.5)
EOSINOPHIL # BLD AUTO: 0 K/UL — SIGNIFICANT CHANGE UP (ref 0–0.5)
EOSINOPHIL # BLD AUTO: 0.01 K/UL — SIGNIFICANT CHANGE UP (ref 0–0.5)
EOSINOPHIL # BLD AUTO: 0.01 K/UL — SIGNIFICANT CHANGE UP (ref 0–0.5)
EOSINOPHIL NFR BLD AUTO: 0 % — SIGNIFICANT CHANGE UP (ref 0–6)
EOSINOPHIL NFR BLD AUTO: 0 % — SIGNIFICANT CHANGE UP (ref 0–6)
EOSINOPHIL NFR BLD AUTO: 0.1 % — SIGNIFICANT CHANGE UP (ref 0–6)
EOSINOPHIL NFR BLD AUTO: 0.1 % — SIGNIFICANT CHANGE UP (ref 0–6)
GLUCOSE SERPL-MCNC: 106 MG/DL — HIGH (ref 70–99)
GLUCOSE SERPL-MCNC: 106 MG/DL — HIGH (ref 70–99)
GLUCOSE SERPL-MCNC: 107 MG/DL — HIGH (ref 70–99)
GLUCOSE SERPL-MCNC: 109 MG/DL — HIGH (ref 70–99)
GLUCOSE SERPL-MCNC: 111 MG/DL — HIGH (ref 70–99)
GLUCOSE SERPL-MCNC: 112 MG/DL — HIGH (ref 70–99)
GLUCOSE SERPL-MCNC: 126 MG/DL — HIGH (ref 70–99)
GLUCOSE SERPL-MCNC: 176 MG/DL — HIGH (ref 70–99)
GLUCOSE SERPL-MCNC: 237 MG/DL — HIGH (ref 70–99)
GLUCOSE SERPL-MCNC: 86 MG/DL — SIGNIFICANT CHANGE UP (ref 70–99)
GLUCOSE SERPL-MCNC: 89 MG/DL — SIGNIFICANT CHANGE UP (ref 70–99)
GLUCOSE SERPL-MCNC: 95 MG/DL — SIGNIFICANT CHANGE UP (ref 70–99)
HCT VFR BLD CALC: 32.8 % — LOW (ref 39–50)
HCT VFR BLD CALC: 34 % — LOW (ref 39–50)
HCT VFR BLD CALC: 34.1 % — LOW (ref 39–50)
HCT VFR BLD CALC: 35.4 % — LOW (ref 39–50)
HCT VFR BLD CALC: 36 % — LOW (ref 39–50)
HCT VFR BLD CALC: 36.6 % — LOW (ref 39–50)
HCT VFR BLD CALC: 38.8 % — LOW (ref 39–50)
HCT VFR BLD CALC: 39.1 % — SIGNIFICANT CHANGE UP (ref 39–50)
HCT VFR BLD CALC: 41.1 % — SIGNIFICANT CHANGE UP (ref 39–50)
HCT VFR BLD CALC: 45.4 % — SIGNIFICANT CHANGE UP (ref 39–50)
HCT VFR BLD CALC: 45.5 % — SIGNIFICANT CHANGE UP (ref 39–50)
HCT VFR BLD CALC: 47 % — SIGNIFICANT CHANGE UP (ref 39–50)
HGB BLD-MCNC: 10.6 G/DL — LOW (ref 13–17)
HGB BLD-MCNC: 10.9 G/DL — LOW (ref 13–17)
HGB BLD-MCNC: 11.2 G/DL — LOW (ref 13–17)
HGB BLD-MCNC: 11.5 G/DL — LOW (ref 13–17)
HGB BLD-MCNC: 11.7 G/DL — LOW (ref 13–17)
HGB BLD-MCNC: 11.7 G/DL — LOW (ref 13–17)
HGB BLD-MCNC: 12.2 G/DL — LOW (ref 13–17)
HGB BLD-MCNC: 12.8 G/DL — LOW (ref 13–17)
HGB BLD-MCNC: 12.9 G/DL — LOW (ref 13–17)
HGB BLD-MCNC: 13.8 G/DL — SIGNIFICANT CHANGE UP (ref 13–17)
HGB BLD-MCNC: 14.7 G/DL — SIGNIFICANT CHANGE UP (ref 13–17)
HGB BLD-MCNC: 15 G/DL — SIGNIFICANT CHANGE UP (ref 13–17)
IMM GRANULOCYTES NFR BLD AUTO: 0.2 % — SIGNIFICANT CHANGE UP (ref 0–1.5)
IMM GRANULOCYTES NFR BLD AUTO: 0.4 % — SIGNIFICANT CHANGE UP (ref 0–1.5)
IMM GRANULOCYTES NFR BLD AUTO: 0.4 % — SIGNIFICANT CHANGE UP (ref 0–1.5)
IMM GRANULOCYTES NFR BLD AUTO: 0.5 % — SIGNIFICANT CHANGE UP (ref 0–1.5)
INR BLD: 1.03 RATIO — SIGNIFICANT CHANGE UP (ref 0.88–1.16)
INR BLD: 1.05 RATIO — SIGNIFICANT CHANGE UP (ref 0.88–1.16)
INR BLD: 1.11 RATIO — SIGNIFICANT CHANGE UP (ref 0.88–1.16)
LACTATE SERPL-SCNC: 1.6 MMOL/L — SIGNIFICANT CHANGE UP (ref 0.7–2)
LACTATE SERPL-SCNC: 1.7 MMOL/L — SIGNIFICANT CHANGE UP (ref 0.7–2)
LACTATE SERPL-SCNC: 11.1 MMOL/L — CRITICAL HIGH (ref 0.7–2)
LACTATE SERPL-SCNC: 2.5 MMOL/L — HIGH (ref 0.7–2)
LACTATE SERPL-SCNC: 3.2 MMOL/L — HIGH (ref 0.7–2)
LACTATE SERPL-SCNC: 3.7 MMOL/L — HIGH (ref 0.7–2)
LACTATE SERPL-SCNC: 4.3 MMOL/L — CRITICAL HIGH (ref 0.7–2)
LACTATE SERPL-SCNC: 4.7 MMOL/L — CRITICAL HIGH (ref 0.7–2)
LACTATE SERPL-SCNC: 6.4 MMOL/L — CRITICAL HIGH (ref 0.7–2)
LYMPHOCYTES # BLD AUTO: 1.09 K/UL — SIGNIFICANT CHANGE UP (ref 1–3.3)
LYMPHOCYTES # BLD AUTO: 1.54 K/UL — SIGNIFICANT CHANGE UP (ref 1–3.3)
LYMPHOCYTES # BLD AUTO: 1.6 K/UL — SIGNIFICANT CHANGE UP (ref 1–3.3)
LYMPHOCYTES # BLD AUTO: 12.2 % — LOW (ref 13–44)
LYMPHOCYTES # BLD AUTO: 12.7 % — LOW (ref 13–44)
LYMPHOCYTES # BLD AUTO: 13.1 % — SIGNIFICANT CHANGE UP (ref 13–44)
LYMPHOCYTES # BLD AUTO: 2.31 K/UL — SIGNIFICANT CHANGE UP (ref 1–3.3)
LYMPHOCYTES # BLD AUTO: 6.4 % — LOW (ref 13–44)
MAGNESIUM SERPL-MCNC: 1.8 MG/DL — SIGNIFICANT CHANGE UP (ref 1.6–2.6)
MAGNESIUM SERPL-MCNC: 1.9 MG/DL — SIGNIFICANT CHANGE UP (ref 1.6–2.6)
MAGNESIUM SERPL-MCNC: 2.1 MG/DL — SIGNIFICANT CHANGE UP (ref 1.6–2.6)
MCHC RBC-ENTMCNC: 28.2 PG — SIGNIFICANT CHANGE UP (ref 27–34)
MCHC RBC-ENTMCNC: 28.3 PG — SIGNIFICANT CHANGE UP (ref 27–34)
MCHC RBC-ENTMCNC: 28.5 PG — SIGNIFICANT CHANGE UP (ref 27–34)
MCHC RBC-ENTMCNC: 28.6 PG — SIGNIFICANT CHANGE UP (ref 27–34)
MCHC RBC-ENTMCNC: 28.7 PG — SIGNIFICANT CHANGE UP (ref 27–34)
MCHC RBC-ENTMCNC: 28.7 PG — SIGNIFICANT CHANGE UP (ref 27–34)
MCHC RBC-ENTMCNC: 29 PG — SIGNIFICANT CHANGE UP (ref 27–34)
MCHC RBC-ENTMCNC: 29.1 PG — SIGNIFICANT CHANGE UP (ref 27–34)
MCHC RBC-ENTMCNC: 30.3 GM/DL — LOW (ref 32–36)
MCHC RBC-ENTMCNC: 31.4 GM/DL — LOW (ref 32–36)
MCHC RBC-ENTMCNC: 31.9 GM/DL — LOW (ref 32–36)
MCHC RBC-ENTMCNC: 32.1 GM/DL — SIGNIFICANT CHANGE UP (ref 32–36)
MCHC RBC-ENTMCNC: 32.3 GM/DL — SIGNIFICANT CHANGE UP (ref 32–36)
MCHC RBC-ENTMCNC: 32.4 GM/DL — SIGNIFICANT CHANGE UP (ref 32–36)
MCHC RBC-ENTMCNC: 32.5 GM/DL — SIGNIFICANT CHANGE UP (ref 32–36)
MCHC RBC-ENTMCNC: 32.7 GM/DL — SIGNIFICANT CHANGE UP (ref 32–36)
MCHC RBC-ENTMCNC: 32.8 GM/DL — SIGNIFICANT CHANGE UP (ref 32–36)
MCHC RBC-ENTMCNC: 33.1 GM/DL — SIGNIFICANT CHANGE UP (ref 32–36)
MCV RBC AUTO: 87 FL — SIGNIFICANT CHANGE UP (ref 80–100)
MCV RBC AUTO: 87.3 FL — SIGNIFICANT CHANGE UP (ref 80–100)
MCV RBC AUTO: 87.6 FL — SIGNIFICANT CHANGE UP (ref 80–100)
MCV RBC AUTO: 88.3 FL — SIGNIFICANT CHANGE UP (ref 80–100)
MCV RBC AUTO: 88.5 FL — SIGNIFICANT CHANGE UP (ref 80–100)
MCV RBC AUTO: 88.6 FL — SIGNIFICANT CHANGE UP (ref 80–100)
MCV RBC AUTO: 88.6 FL — SIGNIFICANT CHANGE UP (ref 80–100)
MCV RBC AUTO: 89.9 FL — SIGNIFICANT CHANGE UP (ref 80–100)
MCV RBC AUTO: 89.9 FL — SIGNIFICANT CHANGE UP (ref 80–100)
MCV RBC AUTO: 90 FL — SIGNIFICANT CHANGE UP (ref 80–100)
MCV RBC AUTO: 90.9 FL — SIGNIFICANT CHANGE UP (ref 80–100)
MCV RBC AUTO: 94.4 FL — SIGNIFICANT CHANGE UP (ref 80–100)
MONOCYTES # BLD AUTO: 0.56 K/UL — SIGNIFICANT CHANGE UP (ref 0–0.9)
MONOCYTES # BLD AUTO: 0.67 K/UL — SIGNIFICANT CHANGE UP (ref 0–0.9)
MONOCYTES # BLD AUTO: 0.7 K/UL — SIGNIFICANT CHANGE UP (ref 0–0.9)
MONOCYTES # BLD AUTO: 1.06 K/UL — HIGH (ref 0–0.9)
MONOCYTES NFR BLD AUTO: 4 % — SIGNIFICANT CHANGE UP (ref 2–14)
MONOCYTES NFR BLD AUTO: 4.3 % — SIGNIFICANT CHANGE UP (ref 2–14)
MONOCYTES NFR BLD AUTO: 5.8 % — SIGNIFICANT CHANGE UP (ref 2–14)
MONOCYTES NFR BLD AUTO: 6 % — SIGNIFICANT CHANGE UP (ref 2–14)
NEUTROPHILS # BLD AUTO: 10.87 K/UL — HIGH (ref 1.8–7.4)
NEUTROPHILS # BLD AUTO: 14.18 K/UL — HIGH (ref 1.8–7.4)
NEUTROPHILS # BLD AUTO: 15.06 K/UL — HIGH (ref 1.8–7.4)
NEUTROPHILS # BLD AUTO: 9.87 K/UL — HIGH (ref 1.8–7.4)
NEUTROPHILS NFR BLD AUTO: 80.2 % — HIGH (ref 43–77)
NEUTROPHILS NFR BLD AUTO: 81.2 % — HIGH (ref 43–77)
NEUTROPHILS NFR BLD AUTO: 82.7 % — HIGH (ref 43–77)
NEUTROPHILS NFR BLD AUTO: 89 % — HIGH (ref 43–77)
NT-PROBNP SERPL-SCNC: 1053 PG/ML — HIGH (ref 0–125)
PHOSPHATE SERPL-MCNC: 1.8 MG/DL — LOW (ref 2.5–4.5)
PHOSPHATE SERPL-MCNC: 1.9 MG/DL — LOW (ref 2.5–4.5)
PHOSPHATE SERPL-MCNC: 2.2 MG/DL — LOW (ref 2.5–4.5)
PLATELET # BLD AUTO: 118 K/UL — LOW (ref 150–400)
PLATELET # BLD AUTO: 124 K/UL — LOW (ref 150–400)
PLATELET # BLD AUTO: 127 K/UL — LOW (ref 150–400)
PLATELET # BLD AUTO: 130 K/UL — LOW (ref 150–400)
PLATELET # BLD AUTO: 132 K/UL — LOW (ref 150–400)
PLATELET # BLD AUTO: 139 K/UL — LOW (ref 150–400)
PLATELET # BLD AUTO: 161 K/UL — SIGNIFICANT CHANGE UP (ref 150–400)
PLATELET # BLD AUTO: 166 K/UL — SIGNIFICANT CHANGE UP (ref 150–400)
PLATELET # BLD AUTO: 169 K/UL — SIGNIFICANT CHANGE UP (ref 150–400)
PLATELET # BLD AUTO: 186 K/UL — SIGNIFICANT CHANGE UP (ref 150–400)
PLATELET # BLD AUTO: 192 K/UL — SIGNIFICANT CHANGE UP (ref 150–400)
PLATELET # BLD AUTO: 221 K/UL — SIGNIFICANT CHANGE UP (ref 150–400)
POTASSIUM SERPL-MCNC: 2.6 MMOL/L — CRITICAL LOW (ref 3.5–5.3)
POTASSIUM SERPL-MCNC: 3 MMOL/L — LOW (ref 3.5–5.3)
POTASSIUM SERPL-MCNC: 3.2 MMOL/L — LOW (ref 3.5–5.3)
POTASSIUM SERPL-MCNC: 3.2 MMOL/L — LOW (ref 3.5–5.3)
POTASSIUM SERPL-MCNC: 3.3 MMOL/L — LOW (ref 3.5–5.3)
POTASSIUM SERPL-MCNC: 3.4 MMOL/L — LOW (ref 3.5–5.3)
POTASSIUM SERPL-MCNC: 3.5 MMOL/L — SIGNIFICANT CHANGE UP (ref 3.5–5.3)
POTASSIUM SERPL-MCNC: 3.7 MMOL/L — SIGNIFICANT CHANGE UP (ref 3.5–5.3)
POTASSIUM SERPL-MCNC: 3.9 MMOL/L — SIGNIFICANT CHANGE UP (ref 3.5–5.3)
POTASSIUM SERPL-MCNC: 4.7 MMOL/L — SIGNIFICANT CHANGE UP (ref 3.5–5.3)
POTASSIUM SERPL-SCNC: 2.6 MMOL/L — CRITICAL LOW (ref 3.5–5.3)
POTASSIUM SERPL-SCNC: 3 MMOL/L — LOW (ref 3.5–5.3)
POTASSIUM SERPL-SCNC: 3.2 MMOL/L — LOW (ref 3.5–5.3)
POTASSIUM SERPL-SCNC: 3.2 MMOL/L — LOW (ref 3.5–5.3)
POTASSIUM SERPL-SCNC: 3.3 MMOL/L — LOW (ref 3.5–5.3)
POTASSIUM SERPL-SCNC: 3.4 MMOL/L — LOW (ref 3.5–5.3)
POTASSIUM SERPL-SCNC: 3.5 MMOL/L — SIGNIFICANT CHANGE UP (ref 3.5–5.3)
POTASSIUM SERPL-SCNC: 3.7 MMOL/L — SIGNIFICANT CHANGE UP (ref 3.5–5.3)
POTASSIUM SERPL-SCNC: 3.9 MMOL/L — SIGNIFICANT CHANGE UP (ref 3.5–5.3)
POTASSIUM SERPL-SCNC: 4.7 MMOL/L — SIGNIFICANT CHANGE UP (ref 3.5–5.3)
PROT SERPL-MCNC: 6.7 GM/DL — SIGNIFICANT CHANGE UP (ref 6–8.3)
PROT SERPL-MCNC: 7 GM/DL — SIGNIFICANT CHANGE UP (ref 6–8.3)
PROT SERPL-MCNC: 7.8 GM/DL — SIGNIFICANT CHANGE UP (ref 6–8.3)
PROT SERPL-MCNC: 8.5 GM/DL — HIGH (ref 6–8.3)
PROTHROM AB SERPL-ACNC: 11.5 SEC — SIGNIFICANT CHANGE UP (ref 10–12.9)
PROTHROM AB SERPL-ACNC: 11.7 SEC — SIGNIFICANT CHANGE UP (ref 10–12.9)
PROTHROM AB SERPL-ACNC: 12.4 SEC — SIGNIFICANT CHANGE UP (ref 10–12.9)
RAPID RVP RESULT: SIGNIFICANT CHANGE UP
RBC # BLD: 3.7 M/UL — LOW (ref 4.2–5.8)
RBC # BLD: 3.85 M/UL — LOW (ref 4.2–5.8)
RBC # BLD: 3.85 M/UL — LOW (ref 4.2–5.8)
RBC # BLD: 4.04 M/UL — LOW (ref 4.2–5.8)
RBC # BLD: 4.07 M/UL — LOW (ref 4.2–5.8)
RBC # BLD: 4.07 M/UL — LOW (ref 4.2–5.8)
RBC # BLD: 4.31 M/UL — SIGNIFICANT CHANGE UP (ref 4.2–5.8)
RBC # BLD: 4.48 M/UL — SIGNIFICANT CHANGE UP (ref 4.2–5.8)
RBC # BLD: 4.52 M/UL — SIGNIFICANT CHANGE UP (ref 4.2–5.8)
RBC # BLD: 4.82 M/UL — SIGNIFICANT CHANGE UP (ref 4.2–5.8)
RBC # BLD: 5.22 M/UL — SIGNIFICANT CHANGE UP (ref 4.2–5.8)
RBC # BLD: 5.23 M/UL — SIGNIFICANT CHANGE UP (ref 4.2–5.8)
RBC # FLD: 13.8 % — SIGNIFICANT CHANGE UP (ref 10.3–14.5)
RBC # FLD: 13.9 % — SIGNIFICANT CHANGE UP (ref 10.3–14.5)
RBC # FLD: 14 % — SIGNIFICANT CHANGE UP (ref 10.3–14.5)
RBC # FLD: 14.1 % — SIGNIFICANT CHANGE UP (ref 10.3–14.5)
RBC # FLD: 14.1 % — SIGNIFICANT CHANGE UP (ref 10.3–14.5)
RBC # FLD: 14.2 % — SIGNIFICANT CHANGE UP (ref 10.3–14.5)
RBC # FLD: 14.4 % — SIGNIFICANT CHANGE UP (ref 10.3–14.5)
RBC # FLD: 14.5 % — SIGNIFICANT CHANGE UP (ref 10.3–14.5)
RBC # FLD: 14.5 % — SIGNIFICANT CHANGE UP (ref 10.3–14.5)
RBC # FLD: 14.6 % — HIGH (ref 10.3–14.5)
RBC # FLD: 14.7 % — HIGH (ref 10.3–14.5)
RBC # FLD: 14.7 % — HIGH (ref 10.3–14.5)
SARS-COV-2 IGG SERPL QL IA: NEGATIVE — SIGNIFICANT CHANGE UP
SARS-COV-2 IGM SERPL IA-ACNC: <0.1 INDEX — SIGNIFICANT CHANGE UP
SARS-COV-2 RNA SPEC QL NAA+PROBE: SIGNIFICANT CHANGE UP
SODIUM SERPL-SCNC: 138 MMOL/L — SIGNIFICANT CHANGE UP (ref 135–145)
SODIUM SERPL-SCNC: 141 MMOL/L — SIGNIFICANT CHANGE UP (ref 135–145)
SODIUM SERPL-SCNC: 142 MMOL/L — SIGNIFICANT CHANGE UP (ref 135–145)
SODIUM SERPL-SCNC: 142 MMOL/L — SIGNIFICANT CHANGE UP (ref 135–145)
SODIUM SERPL-SCNC: 143 MMOL/L — SIGNIFICANT CHANGE UP (ref 135–145)
SODIUM SERPL-SCNC: 144 MMOL/L — SIGNIFICANT CHANGE UP (ref 135–145)
SODIUM SERPL-SCNC: 146 MMOL/L — HIGH (ref 135–145)
SPECIMEN SOURCE: SIGNIFICANT CHANGE UP
TROPONIN I SERPL-MCNC: 0.04 NG/ML — SIGNIFICANT CHANGE UP (ref 0.01–0.04)
WBC # BLD: 11.54 K/UL — HIGH (ref 3.8–10.5)
WBC # BLD: 12.15 K/UL — HIGH (ref 3.8–10.5)
WBC # BLD: 13.12 K/UL — HIGH (ref 3.8–10.5)
WBC # BLD: 13.51 K/UL — HIGH (ref 3.8–10.5)
WBC # BLD: 14.93 K/UL — HIGH (ref 3.8–10.5)
WBC # BLD: 16.92 K/UL — HIGH (ref 3.8–10.5)
WBC # BLD: 17.66 K/UL — HIGH (ref 3.8–10.5)
WBC # BLD: 5.3 K/UL — SIGNIFICANT CHANGE UP (ref 3.8–10.5)
WBC # BLD: 5.56 K/UL — SIGNIFICANT CHANGE UP (ref 3.8–10.5)
WBC # BLD: 6.35 K/UL — SIGNIFICANT CHANGE UP (ref 3.8–10.5)
WBC # BLD: 6.76 K/UL — SIGNIFICANT CHANGE UP (ref 3.8–10.5)
WBC # BLD: 9.84 K/UL — SIGNIFICANT CHANGE UP (ref 3.8–10.5)
WBC # FLD AUTO: 11.54 K/UL — HIGH (ref 3.8–10.5)
WBC # FLD AUTO: 12.15 K/UL — HIGH (ref 3.8–10.5)
WBC # FLD AUTO: 13.12 K/UL — HIGH (ref 3.8–10.5)
WBC # FLD AUTO: 13.51 K/UL — HIGH (ref 3.8–10.5)
WBC # FLD AUTO: 14.93 K/UL — HIGH (ref 3.8–10.5)
WBC # FLD AUTO: 16.92 K/UL — HIGH (ref 3.8–10.5)
WBC # FLD AUTO: 17.66 K/UL — HIGH (ref 3.8–10.5)
WBC # FLD AUTO: 5.3 K/UL — SIGNIFICANT CHANGE UP (ref 3.8–10.5)
WBC # FLD AUTO: 5.56 K/UL — SIGNIFICANT CHANGE UP (ref 3.8–10.5)
WBC # FLD AUTO: 6.35 K/UL — SIGNIFICANT CHANGE UP (ref 3.8–10.5)
WBC # FLD AUTO: 6.76 K/UL — SIGNIFICANT CHANGE UP (ref 3.8–10.5)
WBC # FLD AUTO: 9.84 K/UL — SIGNIFICANT CHANGE UP (ref 3.8–10.5)

## 2020-01-01 PROCEDURE — 0298T: CPT

## 2020-01-01 PROCEDURE — 85610 PROTHROMBIN TIME: CPT

## 2020-01-01 PROCEDURE — 99233 SBSQ HOSP IP/OBS HIGH 50: CPT

## 2020-01-01 PROCEDURE — 71045 X-RAY EXAM CHEST 1 VIEW: CPT | Mod: 26

## 2020-01-01 PROCEDURE — 36415 COLL VENOUS BLD VENIPUNCTURE: CPT

## 2020-01-01 PROCEDURE — 71045 X-RAY EXAM CHEST 1 VIEW: CPT

## 2020-01-01 PROCEDURE — 99222 1ST HOSP IP/OBS MODERATE 55: CPT

## 2020-01-01 PROCEDURE — 84100 ASSAY OF PHOSPHORUS: CPT

## 2020-01-01 PROCEDURE — 93306 TTE W/DOPPLER COMPLETE: CPT | Mod: 26

## 2020-01-01 PROCEDURE — 87040 BLOOD CULTURE FOR BACTERIA: CPT

## 2020-01-01 PROCEDURE — 71045 X-RAY EXAM CHEST 1 VIEW: CPT | Mod: 26,77

## 2020-01-01 PROCEDURE — 74177 CT ABD & PELVIS W/CONTRAST: CPT | Mod: 26

## 2020-01-01 PROCEDURE — 99238 HOSP IP/OBS DSCHRG MGMT 30/<: CPT

## 2020-01-01 PROCEDURE — 99232 SBSQ HOSP IP/OBS MODERATE 35: CPT

## 2020-01-01 PROCEDURE — 80053 COMPREHEN METABOLIC PANEL: CPT

## 2020-01-01 PROCEDURE — C9113: CPT

## 2020-01-01 PROCEDURE — 86769 SARS-COV-2 COVID-19 ANTIBODY: CPT

## 2020-01-01 PROCEDURE — 85027 COMPLETE CBC AUTOMATED: CPT

## 2020-01-01 PROCEDURE — 83735 ASSAY OF MAGNESIUM: CPT

## 2020-01-01 PROCEDURE — 80048 BASIC METABOLIC PNL TOTAL CA: CPT

## 2020-01-01 PROCEDURE — 83605 ASSAY OF LACTIC ACID: CPT

## 2020-01-01 PROCEDURE — 99239 HOSP IP/OBS DSCHRG MGMT >30: CPT

## 2020-01-01 PROCEDURE — 93010 ELECTROCARDIOGRAM REPORT: CPT

## 2020-01-01 PROCEDURE — 88305 TISSUE EXAM BY PATHOLOGIST: CPT

## 2020-01-01 PROCEDURE — 74177 CT ABD & PELVIS W/CONTRAST: CPT

## 2020-01-01 PROCEDURE — 88312 SPECIAL STAINS GROUP 1: CPT | Mod: 26

## 2020-01-01 PROCEDURE — 74176 CT ABD & PELVIS W/O CONTRAST: CPT | Mod: 26

## 2020-01-01 PROCEDURE — 74174 CTA ABD&PLVS W/CONTRAST: CPT | Mod: 26

## 2020-01-01 PROCEDURE — 99291 CRITICAL CARE FIRST HOUR: CPT

## 2020-01-01 PROCEDURE — 74176 CT ABD & PELVIS W/O CONTRAST: CPT

## 2020-01-01 PROCEDURE — 99231 SBSQ HOSP IP/OBS SF/LOW 25: CPT

## 2020-01-01 PROCEDURE — 85730 THROMBOPLASTIN TIME PARTIAL: CPT

## 2020-01-01 PROCEDURE — 99223 1ST HOSP IP/OBS HIGH 75: CPT

## 2020-01-01 PROCEDURE — 88312 SPECIAL STAINS GROUP 1: CPT

## 2020-01-01 PROCEDURE — 85025 COMPLETE CBC W/AUTO DIFF WBC: CPT

## 2020-01-01 PROCEDURE — 93306 TTE W/DOPPLER COMPLETE: CPT

## 2020-01-01 PROCEDURE — U0003: CPT

## 2020-01-01 PROCEDURE — 88305 TISSUE EXAM BY PATHOLOGIST: CPT | Mod: 26

## 2020-01-01 RX ORDER — SODIUM CHLORIDE 9 MG/ML
1000 INJECTION INTRAMUSCULAR; INTRAVENOUS; SUBCUTANEOUS ONCE
Refills: 0 | Status: COMPLETED | OUTPATIENT
Start: 2020-01-01 | End: 2020-01-01

## 2020-01-01 RX ORDER — POTASSIUM CHLORIDE 20 MEQ
1 PACKET (EA) ORAL
Qty: 0 | Refills: 0 | DISCHARGE

## 2020-01-01 RX ORDER — PIPERACILLIN AND TAZOBACTAM 4; .5 G/20ML; G/20ML
3.38 INJECTION, POWDER, LYOPHILIZED, FOR SOLUTION INTRAVENOUS EVERY 8 HOURS
Refills: 0 | Status: DISCONTINUED | OUTPATIENT
Start: 2020-01-01 | End: 2020-01-01

## 2020-01-01 RX ORDER — HYDROMORPHONE HYDROCHLORIDE 2 MG/ML
0.5 INJECTION INTRAMUSCULAR; INTRAVENOUS; SUBCUTANEOUS EVERY 4 HOURS
Refills: 0 | Status: DISCONTINUED | OUTPATIENT
Start: 2020-01-01 | End: 2020-01-01

## 2020-01-01 RX ORDER — METRONIDAZOLE 500 MG
TABLET ORAL
Refills: 0 | Status: DISCONTINUED | OUTPATIENT
Start: 2020-01-01 | End: 2020-01-01

## 2020-01-01 RX ORDER — METRONIDAZOLE 500 MG
500 TABLET ORAL EVERY 8 HOURS
Refills: 0 | Status: DISCONTINUED | OUTPATIENT
Start: 2020-01-01 | End: 2020-01-01

## 2020-01-01 RX ORDER — POTASSIUM CHLORIDE 20 MEQ
10 PACKET (EA) ORAL ONCE
Refills: 0 | Status: COMPLETED | OUTPATIENT
Start: 2020-01-01 | End: 2020-01-01

## 2020-01-01 RX ORDER — CHOLECALCIFEROL (VITAMIN D3) 125 MCG
1 CAPSULE ORAL
Qty: 0 | Refills: 0 | DISCHARGE

## 2020-01-01 RX ORDER — HEPARIN SODIUM 5000 [USP'U]/ML
5000 INJECTION INTRAVENOUS; SUBCUTANEOUS EVERY 8 HOURS
Refills: 0 | Status: DISCONTINUED | OUTPATIENT
Start: 2020-01-01 | End: 2020-01-01

## 2020-01-01 RX ORDER — SODIUM CHLORIDE 9 MG/ML
1000 INJECTION, SOLUTION INTRAVENOUS
Refills: 0 | Status: DISCONTINUED | OUTPATIENT
Start: 2020-01-01 | End: 2020-01-01

## 2020-01-01 RX ORDER — PIPERACILLIN AND TAZOBACTAM 4; .5 G/20ML; G/20ML
3.38 INJECTION, POWDER, LYOPHILIZED, FOR SOLUTION INTRAVENOUS ONCE
Refills: 0 | Status: DISCONTINUED | OUTPATIENT
Start: 2020-01-01 | End: 2020-01-01

## 2020-01-01 RX ORDER — POTASSIUM PHOSPHATE, MONOBASIC POTASSIUM PHOSPHATE, DIBASIC 236; 224 MG/ML; MG/ML
15 INJECTION, SOLUTION INTRAVENOUS ONCE
Refills: 0 | Status: COMPLETED | OUTPATIENT
Start: 2020-01-01 | End: 2020-01-01

## 2020-01-01 RX ORDER — PIPERACILLIN AND TAZOBACTAM 4; .5 G/20ML; G/20ML
3.38 INJECTION, POWDER, LYOPHILIZED, FOR SOLUTION INTRAVENOUS ONCE
Refills: 0 | Status: COMPLETED | OUTPATIENT
Start: 2020-01-01 | End: 2020-01-01

## 2020-01-01 RX ORDER — SODIUM CHLORIDE 9 MG/ML
1000 INJECTION INTRAMUSCULAR; INTRAVENOUS; SUBCUTANEOUS
Refills: 0 | Status: DISCONTINUED | OUTPATIENT
Start: 2020-01-01 | End: 2020-01-01

## 2020-01-01 RX ORDER — MAGNESIUM SULFATE 500 MG/ML
2 VIAL (ML) INJECTION ONCE
Refills: 0 | Status: COMPLETED | OUTPATIENT
Start: 2020-01-01 | End: 2020-01-01

## 2020-01-01 RX ORDER — MAGNESIUM HYDROXIDE 400 MG/1
15 TABLET, CHEWABLE ORAL
Qty: 0 | Refills: 0 | DISCHARGE

## 2020-01-01 RX ORDER — ONDANSETRON 8 MG/1
4 TABLET, FILM COATED ORAL EVERY 6 HOURS
Refills: 0 | Status: DISCONTINUED | OUTPATIENT
Start: 2020-01-01 | End: 2020-01-01

## 2020-01-01 RX ORDER — ACETAMINOPHEN 500 MG
650 TABLET ORAL EVERY 6 HOURS
Refills: 0 | Status: DISCONTINUED | OUTPATIENT
Start: 2020-01-01 | End: 2020-01-01

## 2020-01-01 RX ORDER — FINASTERIDE 5 MG/1
5 TABLET, FILM COATED ORAL DAILY
Refills: 0 | Status: DISCONTINUED | OUTPATIENT
Start: 2020-01-01 | End: 2020-01-01

## 2020-01-01 RX ORDER — PANTOPRAZOLE SODIUM 20 MG/1
8 TABLET, DELAYED RELEASE ORAL
Qty: 80 | Refills: 0 | Status: DISCONTINUED | OUTPATIENT
Start: 2020-01-01 | End: 2020-01-01

## 2020-01-01 RX ORDER — PANTOPRAZOLE SODIUM 20 MG/1
40 TABLET, DELAYED RELEASE ORAL
Refills: 0 | Status: DISCONTINUED | OUTPATIENT
Start: 2020-01-01 | End: 2020-01-01

## 2020-01-01 RX ORDER — LANOLIN ALCOHOL/MO/W.PET/CERES
1 CREAM (GRAM) TOPICAL
Qty: 0 | Refills: 0 | DISCHARGE

## 2020-01-01 RX ORDER — POTASSIUM CHLORIDE 20 MEQ
40 PACKET (EA) ORAL ONCE
Refills: 0 | Status: COMPLETED | OUTPATIENT
Start: 2020-01-01 | End: 2020-01-01

## 2020-01-01 RX ORDER — METRONIDAZOLE 500 MG
500 TABLET ORAL ONCE
Refills: 0 | Status: COMPLETED | OUTPATIENT
Start: 2020-01-01 | End: 2020-01-01

## 2020-01-01 RX ORDER — FUROSEMIDE 40 MG
1 TABLET ORAL
Qty: 0 | Refills: 0 | DISCHARGE

## 2020-01-01 RX ORDER — NOREPINEPHRINE BITARTRATE/D5W 8 MG/250ML
0.05 PLASTIC BAG, INJECTION (ML) INTRAVENOUS
Qty: 16 | Refills: 0 | Status: DISCONTINUED | OUTPATIENT
Start: 2020-01-01 | End: 2020-01-01

## 2020-01-01 RX ORDER — FINASTERIDE 5 MG/1
1 TABLET, FILM COATED ORAL
Qty: 0 | Refills: 0 | DISCHARGE

## 2020-01-01 RX ORDER — PANTOPRAZOLE SODIUM 20 MG/1
80 TABLET, DELAYED RELEASE ORAL ONCE
Refills: 0 | Status: COMPLETED | OUTPATIENT
Start: 2020-01-01 | End: 2020-01-01

## 2020-01-01 RX ORDER — PANTOPRAZOLE SODIUM 20 MG/1
40 TABLET, DELAYED RELEASE ORAL DAILY
Refills: 0 | Status: DISCONTINUED | OUTPATIENT
Start: 2020-01-01 | End: 2020-01-01

## 2020-01-01 RX ORDER — DEXTROSE MONOHYDRATE, SODIUM CHLORIDE, AND POTASSIUM CHLORIDE 50; .745; 4.5 G/1000ML; G/1000ML; G/1000ML
1000 INJECTION, SOLUTION INTRAVENOUS
Refills: 0 | Status: DISCONTINUED | OUTPATIENT
Start: 2020-01-01 | End: 2020-01-01

## 2020-01-01 RX ORDER — POTASSIUM CHLORIDE 20 MEQ
40 PACKET (EA) ORAL EVERY 4 HOURS
Refills: 0 | Status: COMPLETED | OUTPATIENT
Start: 2020-01-01 | End: 2020-01-01

## 2020-01-01 RX ORDER — ONDANSETRON 8 MG/1
8 TABLET, FILM COATED ORAL ONCE
Refills: 0 | Status: COMPLETED | OUTPATIENT
Start: 2020-01-01 | End: 2020-01-01

## 2020-01-01 RX ORDER — SODIUM CHLORIDE 9 MG/ML
2000 INJECTION INTRAMUSCULAR; INTRAVENOUS; SUBCUTANEOUS ONCE
Refills: 0 | Status: COMPLETED | OUTPATIENT
Start: 2020-01-01 | End: 2020-01-01

## 2020-01-01 RX ORDER — FENTANYL CITRATE 50 UG/ML
50 INJECTION INTRAVENOUS ONCE
Refills: 0 | Status: DISCONTINUED | OUTPATIENT
Start: 2020-01-01 | End: 2020-01-01

## 2020-01-01 RX ORDER — ENOXAPARIN SODIUM 100 MG/ML
40 INJECTION SUBCUTANEOUS EVERY 12 HOURS
Refills: 0 | Status: DISCONTINUED | OUTPATIENT
Start: 2020-01-01 | End: 2020-01-01

## 2020-01-01 RX ORDER — CHOLESTYRAMINE 4 G/9G
1 POWDER, FOR SUSPENSION ORAL
Qty: 0 | Refills: 0 | DISCHARGE

## 2020-01-01 RX ORDER — SODIUM CHLORIDE 9 MG/ML
1000 INJECTION, SOLUTION INTRAVENOUS ONCE
Refills: 0 | Status: COMPLETED | OUTPATIENT
Start: 2020-01-01 | End: 2020-01-01

## 2020-01-01 RX ORDER — POTASSIUM CHLORIDE 20 MEQ
10 PACKET (EA) ORAL
Refills: 0 | Status: COMPLETED | OUTPATIENT
Start: 2020-01-01 | End: 2020-01-01

## 2020-01-01 RX ORDER — ATORVASTATIN CALCIUM 80 MG/1
1 TABLET, FILM COATED ORAL
Qty: 0 | Refills: 0 | DISCHARGE

## 2020-01-01 RX ORDER — VANCOMYCIN HCL 1 G
1750 VIAL (EA) INTRAVENOUS ONCE
Refills: 0 | Status: COMPLETED | OUTPATIENT
Start: 2020-01-01 | End: 2020-01-01

## 2020-01-01 RX ORDER — PANTOPRAZOLE SODIUM 20 MG/1
40 TABLET, DELAYED RELEASE ORAL ONCE
Refills: 0 | Status: COMPLETED | OUTPATIENT
Start: 2020-01-01 | End: 2020-01-01

## 2020-01-01 RX ORDER — CITALOPRAM 10 MG/1
1 TABLET, FILM COATED ORAL
Qty: 0 | Refills: 0 | DISCHARGE

## 2020-01-01 RX ORDER — NOREPINEPHRINE BITARTRATE/D5W 8 MG/250ML
0.05 PLASTIC BAG, INJECTION (ML) INTRAVENOUS
Qty: 8 | Refills: 0 | Status: DISCONTINUED | OUTPATIENT
Start: 2020-01-01 | End: 2020-01-01

## 2020-01-01 RX ORDER — MORPHINE SULFATE 50 MG/1
3 CAPSULE, EXTENDED RELEASE ORAL ONCE
Refills: 0 | Status: DISCONTINUED | OUTPATIENT
Start: 2020-01-01 | End: 2020-01-01

## 2020-01-01 RX ORDER — VANCOMYCIN HCL 1 G
1000 VIAL (EA) INTRAVENOUS ONCE
Refills: 0 | Status: COMPLETED | OUTPATIENT
Start: 2020-01-01 | End: 2020-01-01

## 2020-01-01 RX ORDER — VASOPRESSIN 20 [USP'U]/ML
0.04 INJECTION INTRAVENOUS
Qty: 50 | Refills: 0 | Status: DISCONTINUED | OUTPATIENT
Start: 2020-01-01 | End: 2020-01-01

## 2020-01-01 RX ORDER — HYDROMORPHONE HYDROCHLORIDE 2 MG/ML
1 INJECTION INTRAMUSCULAR; INTRAVENOUS; SUBCUTANEOUS EVERY 4 HOURS
Refills: 0 | Status: DISCONTINUED | OUTPATIENT
Start: 2020-01-01 | End: 2020-01-01

## 2020-01-01 RX ORDER — MAGNESIUM HYDROXIDE 400 MG/1
30 TABLET, CHEWABLE ORAL
Qty: 0 | Refills: 0 | DISCHARGE

## 2020-01-01 RX ORDER — SODIUM CHLORIDE 9 MG/ML
1400 INJECTION INTRAMUSCULAR; INTRAVENOUS; SUBCUTANEOUS ONCE
Refills: 0 | Status: COMPLETED | OUTPATIENT
Start: 2020-01-01 | End: 2020-01-01

## 2020-01-01 RX ORDER — CITALOPRAM 10 MG/1
20 TABLET, FILM COATED ORAL DAILY
Refills: 0 | Status: DISCONTINUED | OUTPATIENT
Start: 2020-01-01 | End: 2020-01-01

## 2020-01-01 RX ORDER — PANTOPRAZOLE SODIUM 20 MG/1
1 TABLET, DELAYED RELEASE ORAL
Qty: 0 | Refills: 0 | DISCHARGE

## 2020-01-01 RX ORDER — MAGNESIUM SULFATE 500 MG/ML
2 VIAL (ML) INJECTION ONCE
Refills: 0 | Status: DISCONTINUED | OUTPATIENT
Start: 2020-01-01 | End: 2020-01-01

## 2020-01-01 RX ADMIN — Medication 40 MILLIEQUIVALENT(S): at 21:47

## 2020-01-01 RX ADMIN — Medication 100 MILLIEQUIVALENT(S): at 14:45

## 2020-01-01 RX ADMIN — PANTOPRAZOLE SODIUM 10 MG/HR: 20 TABLET, DELAYED RELEASE ORAL at 18:28

## 2020-01-01 RX ADMIN — PIPERACILLIN AND TAZOBACTAM 25 GRAM(S): 4; .5 INJECTION, POWDER, LYOPHILIZED, FOR SOLUTION INTRAVENOUS at 06:40

## 2020-01-01 RX ADMIN — PIPERACILLIN AND TAZOBACTAM 25 GRAM(S): 4; .5 INJECTION, POWDER, LYOPHILIZED, FOR SOLUTION INTRAVENOUS at 13:41

## 2020-01-01 RX ADMIN — SODIUM CHLORIDE 150 MILLILITER(S): 9 INJECTION INTRAMUSCULAR; INTRAVENOUS; SUBCUTANEOUS at 00:32

## 2020-01-01 RX ADMIN — PANTOPRAZOLE SODIUM 10 MG/HR: 20 TABLET, DELAYED RELEASE ORAL at 11:23

## 2020-01-01 RX ADMIN — PANTOPRAZOLE SODIUM 40 MILLIGRAM(S): 20 TABLET, DELAYED RELEASE ORAL at 17:56

## 2020-01-01 RX ADMIN — Medication 100 MILLIEQUIVALENT(S): at 13:17

## 2020-01-01 RX ADMIN — SODIUM CHLORIDE 1000 MILLILITER(S): 9 INJECTION INTRAMUSCULAR; INTRAVENOUS; SUBCUTANEOUS at 01:50

## 2020-01-01 RX ADMIN — PIPERACILLIN AND TAZOBACTAM 200 GRAM(S): 4; .5 INJECTION, POWDER, LYOPHILIZED, FOR SOLUTION INTRAVENOUS at 09:30

## 2020-01-01 RX ADMIN — ENOXAPARIN SODIUM 40 MILLIGRAM(S): 100 INJECTION SUBCUTANEOUS at 06:05

## 2020-01-01 RX ADMIN — Medication 40 MILLIEQUIVALENT(S): at 18:00

## 2020-01-01 RX ADMIN — SODIUM CHLORIDE 1400 MILLILITER(S): 9 INJECTION INTRAMUSCULAR; INTRAVENOUS; SUBCUTANEOUS at 14:30

## 2020-01-01 RX ADMIN — Medication 125 MILLIGRAM(S): at 00:21

## 2020-01-01 RX ADMIN — DEXTROSE MONOHYDRATE, SODIUM CHLORIDE, AND POTASSIUM CHLORIDE 75 MILLILITER(S): 50; .745; 4.5 INJECTION, SOLUTION INTRAVENOUS at 00:57

## 2020-01-01 RX ADMIN — POTASSIUM PHOSPHATE, MONOBASIC POTASSIUM PHOSPHATE, DIBASIC 63.75 MILLIMOLE(S): 236; 224 INJECTION, SOLUTION INTRAVENOUS at 16:48

## 2020-01-01 RX ADMIN — PIPERACILLIN AND TAZOBACTAM 25 GRAM(S): 4; .5 INJECTION, POWDER, LYOPHILIZED, FOR SOLUTION INTRAVENOUS at 05:46

## 2020-01-01 RX ADMIN — PANTOPRAZOLE SODIUM 10 MG/HR: 20 TABLET, DELAYED RELEASE ORAL at 03:30

## 2020-01-01 RX ADMIN — Medication 2 GRAM(S): at 01:32

## 2020-01-01 RX ADMIN — PIPERACILLIN AND TAZOBACTAM 25 GRAM(S): 4; .5 INJECTION, POWDER, LYOPHILIZED, FOR SOLUTION INTRAVENOUS at 21:05

## 2020-01-01 RX ADMIN — CITALOPRAM 20 MILLIGRAM(S): 10 TABLET, FILM COATED ORAL at 10:33

## 2020-01-01 RX ADMIN — ENOXAPARIN SODIUM 40 MILLIGRAM(S): 100 INJECTION SUBCUTANEOUS at 17:06

## 2020-01-01 RX ADMIN — PIPERACILLIN AND TAZOBACTAM 25 GRAM(S): 4; .5 INJECTION, POWDER, LYOPHILIZED, FOR SOLUTION INTRAVENOUS at 05:29

## 2020-01-01 RX ADMIN — SODIUM CHLORIDE 125 MILLILITER(S): 9 INJECTION INTRAMUSCULAR; INTRAVENOUS; SUBCUTANEOUS at 21:04

## 2020-01-01 RX ADMIN — PANTOPRAZOLE SODIUM 10 MG/HR: 20 TABLET, DELAYED RELEASE ORAL at 06:11

## 2020-01-01 RX ADMIN — SODIUM CHLORIDE 150 MILLILITER(S): 9 INJECTION INTRAMUSCULAR; INTRAVENOUS; SUBCUTANEOUS at 13:42

## 2020-01-01 RX ADMIN — DEXTROSE MONOHYDRATE, SODIUM CHLORIDE, AND POTASSIUM CHLORIDE 75 MILLILITER(S): 50; .745; 4.5 INJECTION, SOLUTION INTRAVENOUS at 06:06

## 2020-01-01 RX ADMIN — PANTOPRAZOLE SODIUM 40 MILLIGRAM(S): 20 TABLET, DELAYED RELEASE ORAL at 05:22

## 2020-01-01 RX ADMIN — Medication 40 MILLIEQUIVALENT(S): at 23:06

## 2020-01-01 RX ADMIN — PANTOPRAZOLE SODIUM 40 MILLIGRAM(S): 20 TABLET, DELAYED RELEASE ORAL at 17:06

## 2020-01-01 RX ADMIN — Medication 250 MILLIGRAM(S): at 17:20

## 2020-01-01 RX ADMIN — Medication 100 MILLIGRAM(S): at 14:28

## 2020-01-01 RX ADMIN — CITALOPRAM 20 MILLIGRAM(S): 10 TABLET, FILM COATED ORAL at 10:17

## 2020-01-01 RX ADMIN — Medication 50 GRAM(S): at 22:17

## 2020-01-01 RX ADMIN — ONDANSETRON 8 MILLIGRAM(S): 8 TABLET, FILM COATED ORAL at 17:19

## 2020-01-01 RX ADMIN — DEXTROSE MONOHYDRATE, SODIUM CHLORIDE, AND POTASSIUM CHLORIDE 125 MILLILITER(S): 50; .745; 4.5 INJECTION, SOLUTION INTRAVENOUS at 02:56

## 2020-01-01 RX ADMIN — FENTANYL CITRATE 50 MICROGRAM(S): 50 INJECTION INTRAVENOUS at 04:45

## 2020-01-01 RX ADMIN — Medication 100 MILLIGRAM(S): at 05:46

## 2020-01-01 RX ADMIN — Medication 100 MILLIGRAM(S): at 22:35

## 2020-01-01 RX ADMIN — PIPERACILLIN AND TAZOBACTAM 25 GRAM(S): 4; .5 INJECTION, POWDER, LYOPHILIZED, FOR SOLUTION INTRAVENOUS at 05:36

## 2020-01-01 RX ADMIN — PIPERACILLIN AND TAZOBACTAM 25 GRAM(S): 4; .5 INJECTION, POWDER, LYOPHILIZED, FOR SOLUTION INTRAVENOUS at 22:24

## 2020-01-01 RX ADMIN — PIPERACILLIN AND TAZOBACTAM 25 GRAM(S): 4; .5 INJECTION, POWDER, LYOPHILIZED, FOR SOLUTION INTRAVENOUS at 14:47

## 2020-01-01 RX ADMIN — PANTOPRAZOLE SODIUM 10 MG/HR: 20 TABLET, DELAYED RELEASE ORAL at 19:32

## 2020-01-01 RX ADMIN — PIPERACILLIN AND TAZOBACTAM 25 GRAM(S): 4; .5 INJECTION, POWDER, LYOPHILIZED, FOR SOLUTION INTRAVENOUS at 14:45

## 2020-01-01 RX ADMIN — SODIUM CHLORIDE 1000 MILLILITER(S): 9 INJECTION INTRAMUSCULAR; INTRAVENOUS; SUBCUTANEOUS at 20:55

## 2020-01-01 RX ADMIN — ENOXAPARIN SODIUM 40 MILLIGRAM(S): 100 INJECTION SUBCUTANEOUS at 17:57

## 2020-01-01 RX ADMIN — PANTOPRAZOLE SODIUM 10 MG/HR: 20 TABLET, DELAYED RELEASE ORAL at 22:33

## 2020-01-01 RX ADMIN — SODIUM CHLORIDE 1000 MILLILITER(S): 9 INJECTION INTRAMUSCULAR; INTRAVENOUS; SUBCUTANEOUS at 11:46

## 2020-01-01 RX ADMIN — PIPERACILLIN AND TAZOBACTAM 200 GRAM(S): 4; .5 INJECTION, POWDER, LYOPHILIZED, FOR SOLUTION INTRAVENOUS at 00:39

## 2020-01-01 RX ADMIN — SODIUM CHLORIDE 125 MILLILITER(S): 9 INJECTION INTRAMUSCULAR; INTRAVENOUS; SUBCUTANEOUS at 04:54

## 2020-01-01 RX ADMIN — SODIUM CHLORIDE 1000 MILLILITER(S): 9 INJECTION INTRAMUSCULAR; INTRAVENOUS; SUBCUTANEOUS at 20:07

## 2020-01-01 RX ADMIN — PANTOPRAZOLE SODIUM 10 MG/HR: 20 TABLET, DELAYED RELEASE ORAL at 05:53

## 2020-01-01 RX ADMIN — POTASSIUM PHOSPHATE, MONOBASIC POTASSIUM PHOSPHATE, DIBASIC 63.75 MILLIMOLE(S): 236; 224 INJECTION, SOLUTION INTRAVENOUS at 13:37

## 2020-01-01 RX ADMIN — Medication 40 MILLIEQUIVALENT(S): at 14:46

## 2020-01-01 RX ADMIN — SODIUM CHLORIDE 125 MILLILITER(S): 9 INJECTION INTRAMUSCULAR; INTRAVENOUS; SUBCUTANEOUS at 22:11

## 2020-01-01 RX ADMIN — PANTOPRAZOLE SODIUM 80 MILLIGRAM(S): 20 TABLET, DELAYED RELEASE ORAL at 11:03

## 2020-01-01 RX ADMIN — SODIUM CHLORIDE 1000 MILLILITER(S): 9 INJECTION, SOLUTION INTRAVENOUS at 18:49

## 2020-01-01 RX ADMIN — Medication 50 GRAM(S): at 00:51

## 2020-01-01 RX ADMIN — SODIUM CHLORIDE 1000 MILLILITER(S): 9 INJECTION INTRAMUSCULAR; INTRAVENOUS; SUBCUTANEOUS at 12:59

## 2020-01-01 RX ADMIN — Medication 100 MILLIEQUIVALENT(S): at 13:35

## 2020-01-01 RX ADMIN — PIPERACILLIN AND TAZOBACTAM 200 GRAM(S): 4; .5 INJECTION, POWDER, LYOPHILIZED, FOR SOLUTION INTRAVENOUS at 14:59

## 2020-01-01 RX ADMIN — ENOXAPARIN SODIUM 40 MILLIGRAM(S): 100 INJECTION SUBCUTANEOUS at 05:36

## 2020-01-01 RX ADMIN — Medication 13.4 MICROGRAM(S)/KG/MIN: at 00:38

## 2020-01-01 RX ADMIN — Medication 100 MILLIGRAM(S): at 21:54

## 2020-01-01 RX ADMIN — PIPERACILLIN AND TAZOBACTAM 25 GRAM(S): 4; .5 INJECTION, POWDER, LYOPHILIZED, FOR SOLUTION INTRAVENOUS at 23:06

## 2020-01-01 RX ADMIN — CITALOPRAM 20 MILLIGRAM(S): 10 TABLET, FILM COATED ORAL at 10:31

## 2020-01-01 RX ADMIN — PANTOPRAZOLE SODIUM 10 MG/HR: 20 TABLET, DELAYED RELEASE ORAL at 11:45

## 2020-01-01 RX ADMIN — Medication 100 MILLIGRAM(S): at 05:26

## 2020-01-01 RX ADMIN — PIPERACILLIN AND TAZOBACTAM 25 GRAM(S): 4; .5 INJECTION, POWDER, LYOPHILIZED, FOR SOLUTION INTRAVENOUS at 21:14

## 2020-01-01 RX ADMIN — Medication 10 MILLIGRAM(S): at 11:55

## 2020-01-01 RX ADMIN — ENOXAPARIN SODIUM 40 MILLIGRAM(S): 100 INJECTION SUBCUTANEOUS at 18:00

## 2020-01-01 RX ADMIN — Medication 6.7 MICROGRAM(S)/KG/MIN: at 01:50

## 2020-01-01 RX ADMIN — PANTOPRAZOLE SODIUM 40 MILLIGRAM(S): 20 TABLET, DELAYED RELEASE ORAL at 10:31

## 2020-01-01 RX ADMIN — HEPARIN SODIUM 5000 UNIT(S): 5000 INJECTION INTRAVENOUS; SUBCUTANEOUS at 05:50

## 2020-01-01 RX ADMIN — FINASTERIDE 5 MILLIGRAM(S): 5 TABLET, FILM COATED ORAL at 10:33

## 2020-01-01 RX ADMIN — PANTOPRAZOLE SODIUM 10 MG/HR: 20 TABLET, DELAYED RELEASE ORAL at 03:00

## 2020-01-01 RX ADMIN — PANTOPRAZOLE SODIUM 40 MILLIGRAM(S): 20 TABLET, DELAYED RELEASE ORAL at 20:09

## 2020-01-01 RX ADMIN — POTASSIUM PHOSPHATE, MONOBASIC POTASSIUM PHOSPHATE, DIBASIC 63.75 MILLIMOLE(S): 236; 224 INJECTION, SOLUTION INTRAVENOUS at 18:01

## 2020-01-01 RX ADMIN — SODIUM CHLORIDE 150 MILLILITER(S): 9 INJECTION INTRAMUSCULAR; INTRAVENOUS; SUBCUTANEOUS at 20:16

## 2020-01-01 RX ADMIN — FINASTERIDE 5 MILLIGRAM(S): 5 TABLET, FILM COATED ORAL at 10:18

## 2020-01-01 RX ADMIN — DEXTROSE MONOHYDRATE, SODIUM CHLORIDE, AND POTASSIUM CHLORIDE 75 MILLILITER(S): 50; .745; 4.5 INJECTION, SOLUTION INTRAVENOUS at 16:48

## 2020-01-01 RX ADMIN — Medication 100 MILLIEQUIVALENT(S): at 11:46

## 2020-01-01 RX ADMIN — ENOXAPARIN SODIUM 40 MILLIGRAM(S): 100 INJECTION SUBCUTANEOUS at 18:12

## 2020-01-01 RX ADMIN — SODIUM CHLORIDE 1400 MILLILITER(S): 9 INJECTION INTRAMUSCULAR; INTRAVENOUS; SUBCUTANEOUS at 13:05

## 2020-01-01 RX ADMIN — Medication 100 MILLIEQUIVALENT(S): at 08:39

## 2020-01-01 RX ADMIN — FENTANYL CITRATE 50 MICROGRAM(S): 50 INJECTION INTRAVENOUS at 05:26

## 2020-01-01 RX ADMIN — DEXTROSE MONOHYDRATE, SODIUM CHLORIDE, AND POTASSIUM CHLORIDE 125 MILLILITER(S): 50; .745; 4.5 INJECTION, SOLUTION INTRAVENOUS at 19:11

## 2020-01-01 RX ADMIN — PIPERACILLIN AND TAZOBACTAM 25 GRAM(S): 4; .5 INJECTION, POWDER, LYOPHILIZED, FOR SOLUTION INTRAVENOUS at 05:45

## 2020-01-01 RX ADMIN — PIPERACILLIN AND TAZOBACTAM 25 GRAM(S): 4; .5 INJECTION, POWDER, LYOPHILIZED, FOR SOLUTION INTRAVENOUS at 21:47

## 2020-01-01 RX ADMIN — ENOXAPARIN SODIUM 40 MILLIGRAM(S): 100 INJECTION SUBCUTANEOUS at 05:26

## 2020-01-01 RX ADMIN — ENOXAPARIN SODIUM 40 MILLIGRAM(S): 100 INJECTION SUBCUTANEOUS at 05:22

## 2020-01-01 RX ADMIN — PIPERACILLIN AND TAZOBACTAM 200 GRAM(S): 4; .5 INJECTION, POWDER, LYOPHILIZED, FOR SOLUTION INTRAVENOUS at 22:10

## 2020-01-01 RX ADMIN — FINASTERIDE 5 MILLIGRAM(S): 5 TABLET, FILM COATED ORAL at 10:31

## 2020-01-01 RX ADMIN — Medication 100 MILLIEQUIVALENT(S): at 01:29

## 2020-01-01 RX ADMIN — PANTOPRAZOLE SODIUM 40 MILLIGRAM(S): 20 TABLET, DELAYED RELEASE ORAL at 18:00

## 2020-01-01 RX ADMIN — PANTOPRAZOLE SODIUM 40 MILLIGRAM(S): 20 TABLET, DELAYED RELEASE ORAL at 06:05

## 2020-01-01 RX ADMIN — PANTOPRAZOLE SODIUM 10 MG/HR: 20 TABLET, DELAYED RELEASE ORAL at 13:29

## 2020-01-01 RX ADMIN — SODIUM CHLORIDE 125 MILLILITER(S): 9 INJECTION INTRAMUSCULAR; INTRAVENOUS; SUBCUTANEOUS at 13:11

## 2020-01-01 RX ADMIN — PIPERACILLIN AND TAZOBACTAM 25 GRAM(S): 4; .5 INJECTION, POWDER, LYOPHILIZED, FOR SOLUTION INTRAVENOUS at 14:00

## 2020-01-01 RX ADMIN — PIPERACILLIN AND TAZOBACTAM 25 GRAM(S): 4; .5 INJECTION, POWDER, LYOPHILIZED, FOR SOLUTION INTRAVENOUS at 15:51

## 2020-01-01 RX ADMIN — PANTOPRAZOLE SODIUM 40 MILLIGRAM(S): 20 TABLET, DELAYED RELEASE ORAL at 05:36

## 2020-01-01 RX ADMIN — SODIUM CHLORIDE 125 MILLILITER(S): 9 INJECTION INTRAMUSCULAR; INTRAVENOUS; SUBCUTANEOUS at 05:30

## 2020-01-01 RX ADMIN — DEXTROSE MONOHYDRATE, SODIUM CHLORIDE, AND POTASSIUM CHLORIDE 75 MILLILITER(S): 50; .745; 4.5 INJECTION, SOLUTION INTRAVENOUS at 15:50

## 2020-01-01 RX ADMIN — Medication 40 MILLIEQUIVALENT(S): at 13:22

## 2020-01-01 RX ADMIN — PANTOPRAZOLE SODIUM 10 MG/HR: 20 TABLET, DELAYED RELEASE ORAL at 17:11

## 2020-01-01 RX ADMIN — PIPERACILLIN AND TAZOBACTAM 25 GRAM(S): 4; .5 INJECTION, POWDER, LYOPHILIZED, FOR SOLUTION INTRAVENOUS at 13:22

## 2020-01-01 RX ADMIN — PANTOPRAZOLE SODIUM 40 MILLIGRAM(S): 20 TABLET, DELAYED RELEASE ORAL at 05:50

## 2020-01-01 RX ADMIN — SODIUM CHLORIDE 150 MILLILITER(S): 9 INJECTION INTRAMUSCULAR; INTRAVENOUS; SUBCUTANEOUS at 06:08

## 2020-01-01 RX ADMIN — PIPERACILLIN AND TAZOBACTAM 3.38 GRAM(S): 4; .5 INJECTION, POWDER, LYOPHILIZED, FOR SOLUTION INTRAVENOUS at 01:00

## 2020-01-01 RX ADMIN — SODIUM CHLORIDE 1000 MILLILITER(S): 9 INJECTION INTRAMUSCULAR; INTRAVENOUS; SUBCUTANEOUS at 22:11

## 2020-01-01 RX ADMIN — PIPERACILLIN AND TAZOBACTAM 25 GRAM(S): 4; .5 INJECTION, POWDER, LYOPHILIZED, FOR SOLUTION INTRAVENOUS at 13:29

## 2020-01-01 RX ADMIN — Medication 100 MILLIEQUIVALENT(S): at 12:09

## 2020-01-01 RX ADMIN — PIPERACILLIN AND TAZOBACTAM 25 GRAM(S): 4; .5 INJECTION, POWDER, LYOPHILIZED, FOR SOLUTION INTRAVENOUS at 05:22

## 2020-01-01 RX ADMIN — SODIUM CHLORIDE 1000 MILLILITER(S): 9 INJECTION INTRAMUSCULAR; INTRAVENOUS; SUBCUTANEOUS at 00:21

## 2020-01-01 RX ADMIN — Medication 250 MILLIGRAM(S): at 02:02

## 2020-01-01 RX ADMIN — MORPHINE SULFATE 3 MILLIGRAM(S): 50 CAPSULE, EXTENDED RELEASE ORAL at 08:30

## 2020-01-01 RX ADMIN — VASOPRESSIN 2.4 UNIT(S)/MIN: 20 INJECTION INTRAVENOUS at 04:41

## 2020-01-01 RX ADMIN — PIPERACILLIN AND TAZOBACTAM 25 GRAM(S): 4; .5 INJECTION, POWDER, LYOPHILIZED, FOR SOLUTION INTRAVENOUS at 22:17

## 2020-01-01 RX ADMIN — PANTOPRAZOLE SODIUM 10 MG/HR: 20 TABLET, DELAYED RELEASE ORAL at 00:10

## 2020-01-01 RX ADMIN — Medication 50 GRAM(S): at 10:43

## 2020-01-01 RX ADMIN — Medication 100 MILLIEQUIVALENT(S): at 17:51

## 2020-01-01 RX ADMIN — SODIUM CHLORIDE 2000 MILLILITER(S): 9 INJECTION INTRAMUSCULAR; INTRAVENOUS; SUBCUTANEOUS at 16:30

## 2020-01-01 RX ADMIN — SODIUM CHLORIDE 150 MILLILITER(S): 9 INJECTION INTRAMUSCULAR; INTRAVENOUS; SUBCUTANEOUS at 02:59

## 2020-01-01 RX ADMIN — DEXTROSE MONOHYDRATE, SODIUM CHLORIDE, AND POTASSIUM CHLORIDE 125 MILLILITER(S): 50; .745; 4.5 INJECTION, SOLUTION INTRAVENOUS at 10:34

## 2020-01-01 RX ADMIN — SODIUM CHLORIDE 1000 MILLILITER(S): 9 INJECTION INTRAMUSCULAR; INTRAVENOUS; SUBCUTANEOUS at 01:12

## 2020-01-01 RX ADMIN — PIPERACILLIN AND TAZOBACTAM 25 GRAM(S): 4; .5 INJECTION, POWDER, LYOPHILIZED, FOR SOLUTION INTRAVENOUS at 06:06

## 2020-01-07 NOTE — ED ADULT NURSE NOTE - NS ED NURSE PATIENT LEFT UNIT TIME
DATE OF ADMISSION:  11/21/2019    DATE OF DISCHARGE:  11/22/2019    ADMISSION DIAGNOSES:  1.  Menorrhagia despite previous endometrial ablation.  2.  Dysmenorrhea.  3.  It is possible that the patient has endometriosis.  4.  Premenstrual syndrome.    DISCHARGE DIAGNOSES:  1.  Menorrhagia despite previous endometrial ablation.  2.  Dysmenorrhea.  3.  It is possible that the patient has endometriosis.  4.  Premenstrual syndrome.    PROCEDURES:  Total laparoscopic hysterectomy, bilateral salpingectomy,   followed by cystoscopy.    COMPLICATIONS:  None.    HOSPITAL COURSE:  The patient was admitted to [facility]   on 11/21/2019 with the aforementioned admission diagnoses and underwent a   total laparoscopic hysterectomy, bilateral salpingectomy, followed by   cystoscopy.  Intraoperative findings included a speculum exam under anesthesia   revealed that there were no vulvar, vaginal or cervical lesions and the   cervix was well visualized and appeared nulliparous.  At laparoscopy, evidence   of previous bilateral tubal ligation was seen.  Serosal surfaces of the   uterus were normal.  Both ovaries were normal.  Both ovarian fossae were   normal.  The cul-de-sac was normal.  The liver edge appeared normal.  When   cystoscopy was performed immediately following total laparoscopic   hysterectomy, both ureteral ostia were clearly identified and bilateral   vigorous jets of blue urine were seen indicating that both ureters were   patent.  During cystoscopy, the dome of the bladder was examined and found to   be normal.  The next day, 11/22/2019, the patient said that she had some   abdominal and pelvic discomfort, but that she felt fine otherwise and that she   had no problems or complaints and she was ambulating and urinating and   tolerating a regular diet and passing flatus and was discharged home on that   day.  The patient's hemoglobin went from 15.4 g/dL preoperatively to 13.4 g/dL   postoperatively  on postoperative day #1.  She was discharged home on   postoperative day #1 in stable condition with prescriptions for analgesics and   instructions to follow up with me in the office in about 2 weeks for   postoperative visit and to call or contact me at any time should she ever have   any problems or questions or complaints and she said that she would do so.       ____________________________________     Jericho Du MD    MED / [facility]    DD:  01/06/2020 19:28:05  DT:  01/06/2020 19:55:25    D#:  [id number]  Job#:  [id number]   02:50

## 2020-02-16 NOTE — ED PROVIDER NOTE - PROGRESS NOTE DETAILS
Scribe AS for Dr. YOON Oneill: Stool guaiac performed by Dr. YOON Oneill. Lot #: [id number]; Result: brown stool negative; QC- reactive. Eloina AS for Dr. YOON Oneill: spoke with Dr. Yang, CT shows portal venous gas and signs of stomach ischemia, already received abx, awaiting surgery. pt seen by Dr. Yang - pt to go to ICU. Willis Oneill M.D., Attending Physician

## 2020-02-16 NOTE — CONSULT NOTE ADULT - SUBJECTIVE AND OBJECTIVE BOX
GI consult    HPI:  76 y/o with a PMHx of IBS, MS, Parkinson's, HLD BIBA from [facility] c/o vomiting x last night. Pt states that the emesis was blood tinged. Has had hematemesis in the past. Denies melena, abd pain. Is able to make a BM, pass gas and tolerate PO. Pt is not on any blood thinners. Nonsmoker. Occasional EtOH use.  Called to evaluate emphysematous stomach and portal venous air seen on CT. NGT was inserted with 1L of black colored material out. Pt with elev lactate of 4. No specific complaints. Denies ongoing abd pain.     PAST MEDICAL & SURGICAL HISTORY:  Irritable bowel syndrome with constipation and diarrhea  Parkinsons disease  MS (multiple sclerosis)  S/P placement of cardiac pacemaker  History of brain surgery: for stimulator. 1997,1998      Home Medications:  atorvastatin 10 mg oral tablet: 1 tab(s) orally once a day (17 Jul 2019 01:08)  CeleXA 20 mg oral tablet: 1 tab(s) orally once a day (17 Jul 2019 01:08)  cholestyramine 4 g/5 g oral powder for reconstitution: 1 packet(s) orally once a day (17 Jul 2019 01:08)  furosemide 40 mg oral tablet: 1 tab(s) orally once a day (17 Jul 2019 01:08)  Susan-Lanta oral suspension: 10 milliliter(s) orally 4 times a day (after meals and at bedtime) (17 Jul 2019 01:08)  Melatonin 3 mg oral tablet: 1 tab(s) orally once (at bedtime) (17 Jul 2019 01:08)  Milk of Magnesia 8% oral suspension: 15 milliliter(s) orally once a day (at bedtime), As Needed (17 Jul 2019 01:08)  pantoprazole 40 mg oral delayed release tablet: 1 tab(s) orally once a day (17 Jul 2019 01:08)  Proscar 5 mg oral tablet: 1 tab(s) orally once a day (17 Jul 2019 01:08)  Vitamin D3 50,000 intl units oral capsule: 1 cap(s) orally once a month (17 Jul 2019 01:08)      MEDICATIONS  (STANDING):  lactated ringers Bolus 1000 milliLiter(s) IV Bolus once  lactated ringers. 1000 milliLiter(s) (150 mL/Hr) IV Continuous <Continuous>  pantoprazole Infusion 8 mG/Hr (10 mL/Hr) IV Continuous <Continuous>  pantoprazole Infusion 8 mG/Hr (10 mL/Hr) IV Continuous <Continuous>  piperacillin/tazobactam IVPB.. 3.375 Gram(s) IV Intermittent Once  vancomycin  IVPB 1750 milliGRAM(s) IV Intermittent once    MEDICATIONS  (PRN):  acetaminophen  Suppository .. 650 milliGRAM(s) Rectal every 6 hours PRN Temp greater or equal to 38C (100.4F), Mild Pain (1 - 3)  HYDROmorphone  Injectable 0.5 milliGRAM(s) IV Push every 4 hours PRN Moderate Pain (4 - 6)  HYDROmorphone  Injectable 1 milliGRAM(s) IV Push every 4 hours PRN Severe Pain (7 - 10)  ondansetron Injectable 4 milliGRAM(s) IV Push every 6 hours PRN Nausea      Allergies    Pineapple (Unknown)  sulfa drugs (Unknown)    Intolerances        SOCIAL HISTORY: unable to obtain    FAMILY HISTORY:  FHx: hypertension: father      ROS  As above  Otherwise unremarkable, all systems reviewed    PE:  Vital Signs Last 24 Hrs  T(C): 36.9 (16 Feb 2020 15:00), Max: 36.9 (16 Feb 2020 10:07)  T(F): 98.4 (16 Feb 2020 15:00), Max: 98.4 (16 Feb 2020 10:07)  HR: 102 (16 Feb 2020 16:09) (67 - 105)  BP: 159/94 (16 Feb 2020 16:09) (110/58 - 159/94)  BP(mean): --  RR: 21 (16 Feb 2020 16:09) (15 - 21)  SpO2: 97% (16 Feb 2020 16:09) (94% - 99%)    Constitutional: chronically ill appearing, morbidly obese and with anasarca, pale, minimally interactive  Anicteric   Respiratory: CTABL, breathing comfortably  Cardiovascular: S1 and S2, RRR  Gastrointestinal: firm, distended, anasarca and obesity limits exam  Extremities: warm, well perfused, severe edema  Psychiatric: minimal interaction  Neuro: unable to assess  Skin: No rashes or lesions    LABS:                        15.0   17.66 )-----------( 221      ( 16 Feb 2020 10:33 )             47.0     02-16    140  |  108  |  24<H>  ----------------------------<  169<H>  3.4<L>   |  23  |  1.52<H>    Ca    9.3      16 Feb 2020 11:57    TPro  8.6<H>  /  Alb  3.5  /  TBili  0.7  /  DBili  x   /  AST  17  /  ALT  18  /  AlkPhos  106  02-16    PT/INR - ( 16 Feb 2020 10:33 )   PT: 11.5 sec;   INR: 1.03 ratio         PTT - ( 16 Feb 2020 10:33 )  PTT:24.7 sec  LIVER FUNCTIONS - ( 16 Feb 2020 11:57 )  Alb: 3.5 g/dL / Pro: 8.6 gm/dL / ALK PHOS: 106 U/L / ALT: 18 U/L / AST: 17 U/L / GGT: x             RADIOLOGY & ADDITIONAL STUDIES:    EXAM:  CT ANGIO ABD PELVIS (W)AW IC                            PROCEDURE DATE:  02/16/2020          INTERPRETATION:  CLINICAL INFORMATION: GI bleed    COMPARISON: CT abdomen pelvis 7/16/2019    PROCEDURE:   CT of the Abdomen and Pelvis was performed with intravenous contrast.   Intravenous contrast: 90 ml Omnipaque 350. 10 ml discarded.  Oral contrast: None.  Sagittal and coronal reformats were performed.    FINDINGS:    LOWER CHEST: Minimal bibasilar atelectasis left greater than right.    LIVER: Mild hepatic steatosis. No focal hepatic masses.  BILE DUCTS: Normal caliber.  GALLBLADDER: Within normal limits.  SPLEEN: Within normal limits.  PANCREAS: Within normal limits.  ADRENALS: Within normal limits.  KIDNEYS/URETERS: Within normal limits.    BLADDER: Within normal limits.  REPRODUCTIVE ORGANS: Enlarged prostate gland.    BOWEL: Pneumatosis involving the gastric fundus and proximal gastric body with extension of air into the adjacent portal vein walls. Pneumatosis extends into the GE junction. Enhancement of the wall of the distal esophagus. Portal venous gas. Findings compatible with ischemia of the stomach. Large amount of stool within the rectosigmoid colon with marked air distention of the sigmoid colon and descending colon as seen on prior exams. No bowel obstruction. No small large bowel wall thickening. No intraluminal extravasation of contrast to suggest active GI bleeding.  PERITONEUM: No ascites.  VESSELS: Portal venous gas within the left hepatic lobe. Atherosclerotic changes of the aorta without aneurysmal dilatation.  RETROPERITONEUM/LYMPH NODES: No lymphadenopathy.    ABDOMINAL WALL: Within normal limits.  BONES: Degenerative changes. Osteopenia.    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    Critical value:  I discussed the finding of this report with Dr. Oneill at 2/16/2020 2:26 PM.  Critical value policy of the hospital was followed.  Read back and confirmation of receipt of this communication was performed.  This verbal communication supplements the text report of this document.                SONI YUEN M.D., ATTENDING RADIOLOGIST  This document has been electronically signed. Feb 16 2020  2:27PM          CT images reviewed personally in PACS

## 2020-02-16 NOTE — PROGRESS NOTE ADULT - ASSESSMENT
A/P: 75 male with gastric pneumatosis, lactic acidosis, and RAYNA  MS  PD    Plan:  ICU  NPO  NGT to suction  On PPi drip  Repeat Lactate PND  finishing 4th L IVF  S/P Zosyn and to be given vanco  If perforated he will need antifungal coverage  Patient will need to go to the OR  Surgery to call the family    As per the Patient he is a DNR/DNI    S/W Surgery and GI

## 2020-02-16 NOTE — H&P ADULT - HISTORY OF PRESENT ILLNESS
76 y/o with a PMHx of IBS, MS, Parkinson's, HLD BIBA from [facility] c/o vomiting x last night. Pt states that the emesis was blood tinged. Has had hematemesis in the past. Denies melena, abd pain. Is able to make a BM, pass gas and tolerate PO. Pt is not on any blood thinners. Nonsmoker. Occasional EtOH use.    Pt seen and examined at bedside with chaperone. Pt is AAOx3, pt in no acute distress; pt's speech is clear though limited secondary to his neurologic pathology (MS, parkinsons) pt states stuttering at baseline. Pt denied c/o fever, chills, chest pain, SOB, abd pain, extremity pain or dysfunction, hemoptysis, hematuria, hematochexia, headache, diplopia, vertigo, dizzyness. Pt states (+) void, (+) bowel function, pt states his abdomen has been distended for approx 1 year and has not caused him any pain in that time

## 2020-02-16 NOTE — H&P ADULT - ASSESSMENT
A/P:  Gastric empysema/pneumatosis  Portal venous gas  Grossly distended distal colon; possible related to underlying adynamic colon of MS disorder  Lactic acidosis  Hematemesis  IV antibiotics  NGT decompression  NPO, aggressive IV hydration  GI/DVT prophylaxis  Pain control  Incentive spirometry  Serial abd exams  F/U labs  ICU consult and close monitoring  GI consult  Pt will be monitored for signs of evolution/resolution of pathology and surgical intervention as required and warranted  Pt aware of and agrees with all of the above

## 2020-02-16 NOTE — ED ADULT NURSE REASSESSMENT NOTE - NS ED NURSE REASSESS COMMENT FT1
Pt had an episode of coffee ground emesis, small in amount. Pt cleaned and repositioned for comfort. Pt to be taken to CT at this time. Will continue to monitor for safety and comfort.

## 2020-02-16 NOTE — PROGRESS NOTE ADULT - ATTENDING COMMENTS
Update 1042pm  Spoke with pts family and sons regarding recent repeat lactate and cbc, Lactate is 3.7 down from prior 6.4, wbc is 14.8, pt is hemodynamically stable and asymptomatic at time of exam.   General decision from family and via discussion over telephone with Dr Stauffer ( of surgery), is to continue observation of pt, GI protonix infusion, ngt decompression, IV rehydration, repeat lactate in 2 hours, cont hemodynamic monitoring and serial abd exams, in lieu of surgical intervention (exploration of abd, colon resection, ostomy, possible gastric resection) at this time. Will cont to monitor pt and pt status

## 2020-02-16 NOTE — H&P ADULT - NSHPLABSRESULTS_GEN_ALL_CORE
Labs:  CARDIAC MARKERS ( 16 Feb 2020 11:57 )  <0.015 ng/mL / x     / x     / x     / x                     15.0   17.66 )-----------( 221      ( 16 Feb 2020 10:33 )             47.0   CBC Full  -  ( 16 Feb 2020 10:33 )  WBC Count : 17.66 K/uL  RBC Count : 5.23 M/uL  Hemoglobin : 15.0 g/dL  Hematocrit : 47.0 %  Platelet Count - Automated : 221 K/uL  Mean Cell Volume : 89.9 fl  Mean Cell Hemoglobin : 28.7 pg  Mean Cell Hemoglobin Concentration : 31.9 gm/dL  Auto Neutrophil # : 14.18 K/uL  Auto Lymphocyte # : 2.31 K/uL  Auto Monocyte # : 1.06 K/uL  Auto Eosinophil # : 0.01 K/uL  Auto Basophil # : 0.03 K/uL  Auto Neutrophil % : 80.2 %  Auto Lymphocyte % : 13.1 %  Auto Monocyte % : 6.0 %  Auto Eosinophil % : 0.1 %  Auto Basophil % : 0.2 %  140  |  108  |  24<H>  ----------------------------<  169<H>  3.4<L>   |  23  |  1.52<H>  Ca    9.3      16 Feb 2020 11:57  TPro  8.6<H>  /  Alb  3.5  /  TBili  0.7  /  DBili  x   /  AST  17  /  ALT  18  /  AlkPhos  106  02-16  LIVER FUNCTIONS - ( 16 Feb 2020 11:57 )  Alb: 3.5 g/dL / Pro: 8.6 gm/dL / ALK PHOS: 106 U/L / ALT: 18 U/L / AST: 17 U/L / GGT: x         PT/INR - ( 16 Feb 2020 10:33 )   PT: 11.5 sec;   INR: 1.03 ratio    PTT - ( 16 Feb 2020 10:33 )  PTT:24.7 sec  Lactate: 4.6    Radiology:    EXAM:  CT ANGIO ABD PELVIS (W)AW IC                          PROCEDURE DATE:  02/16/2020      INTERPRETATION:  CLINICAL INFORMATION: GI bleed    COMPARISON: CT abdomen pelvis 7/16/2019    IMPRESSION:     Gastric pneumatosis with portal venous gashighly concerning for emphysematous gastritis     Large amount of stool within the rectosigmoid colon with marked air distention of the descending colon and sigmoid colon.

## 2020-02-16 NOTE — ED PROVIDER NOTE - NS ED ROS FT
Constitutional: No fever or chills  Eyes: No visual changes  HEENT: No throat pain  CV: No chest pain  Resp: No SOB no cough  GI: No abd pain. +hematemesis  : No dysuria  MSK: No musculoskeletal pain  Skin: No rash  Neuro: No headache

## 2020-02-16 NOTE — ED PROVIDER NOTE - PHYSICAL EXAMINATION
Constitutional: NAD AAOx3  Eyes: PERRLA EOMI  Head: Normocephalic atraumatic  Mouth: MMM  Cardiac: +tachycardia  Resp: Lungs CTAB  GI: Abd s/nt +tense distension of abd  Neuro: CN2-12 intact  Skin: No rashes Constitutional: NAD   Eyes: PERRLA EOMI  Head: Normocephalic atraumatic  Mouth: MMM  Cardiac: +tachycardia  Resp: Lungs CTAB  GI: Abd s/nt +tense distension of abd  Neuro: CN2-12 intact  Skin: No rashes

## 2020-02-16 NOTE — ED PROVIDER NOTE - OBJECTIVE STATEMENT
74 y/o with a PMHx of IBS, MS, Parkinson's, HLD BIBA from [facility] c/o vomiting x last night. Pt states that the emesis was blood tinged. Has had hematemesis in the past. Denies melena, abd pain. Is able to make a BM, pass gas and tolerate PO. Pt is not on any blood thinners. Nonsmoker. Occasional EtOH use.

## 2020-02-16 NOTE — PROGRESS NOTE ADULT - SUBJECTIVE AND OBJECTIVE BOX
CC:Patient is a 75y old  Male who presents with a chief complaint of pneumatosis, portal venous gas (16 Feb 2020 17:22)      Subjective:  Pt seen and examined at bedside with chaperone. Pt is awake, alert, comfortable, cooperative, oriented, pt in no acute distress. Pt denied c/o fever, chills, chest pain, SOB, abd pain, extremity pain or dysfunction, hemoptysis, hematemesis, hematuria, hematochexia, headache, diplopia, vertigo, dizzyness. (+) stewart cath, (+) NGT    ROS:  otherwise as abovementioned ROS    Vital Signs Last 24 Hrs  T(C): 37.3 (16 Feb 2020 20:00), Max: 37.7 (16 Feb 2020 19:00)  T(F): 99.2 (16 Feb 2020 20:00), Max: 99.8 (16 Feb 2020 19:00)  HR: 93 (16 Feb 2020 20:00) (67 - 105)  BP: 115/98 (16 Feb 2020 20:00) (110/58 - 159/94)  BP(mean): 103 (16 Feb 2020 20:00) (93 - 103)  RR: 20 (16 Feb 2020 20:00) (15 - 21)  SpO2: 99% (16 Feb 2020 20:00) (94% - 100%)    Labs:  Pending repeat lactate and cbc at 9pm    CARDIAC MARKERS ( 16 Feb 2020 11:57 )  <0.015 ng/mL / x     / x     / x     / x                                15.0   17.66 )-----------( 221      ( 16 Feb 2020 10:33 )             47.0     CBC Full  -  ( 16 Feb 2020 10:33 )  WBC Count : 17.66 K/uL  RBC Count : 5.23 M/uL  Hemoglobin : 15.0 g/dL  Hematocrit : 47.0 %  Platelet Count - Automated : 221 K/uL  Mean Cell Volume : 89.9 fl  Mean Cell Hemoglobin : 28.7 pg  Mean Cell Hemoglobin Concentration : 31.9 gm/dL  Auto Neutrophil # : 14.18 K/uL  Auto Lymphocyte # : 2.31 K/uL  Auto Monocyte # : 1.06 K/uL  Auto Eosinophil # : 0.01 K/uL  Auto Basophil # : 0.03 K/uL  Auto Neutrophil % : 80.2 %  Auto Lymphocyte % : 13.1 %  Auto Monocyte % : 6.0 %  Auto Eosinophil % : 0.1 %  Auto Basophil % : 0.2 %    02-16    140  |  108  |  24<H>  ----------------------------<  169<H>  3.4<L>   |  23  |  1.52<H>    Ca    9.3      16 Feb 2020 11:57    TPro  8.6<H>  /  Alb  3.5  /  TBili  0.7  /  DBili  x   /  AST  17  /  ALT  18  /  AlkPhos  106  02-16    LIVER FUNCTIONS - ( 16 Feb 2020 11:57 )  Alb: 3.5 g/dL / Pro: 8.6 gm/dL / ALK PHOS: 106 U/L / ALT: 18 U/L / AST: 17 U/L / GGT: x           PT/INR - ( 16 Feb 2020 10:33 )   PT: 11.5 sec;   INR: 1.03 ratio         PTT - ( 16 Feb 2020 10:33 )  PTT:24.7 sec      Meds:  acetaminophen  Suppository .. 650 milliGRAM(s) Rectal every 6 hours PRN  HYDROmorphone  Injectable 0.5 milliGRAM(s) IV Push every 4 hours PRN  HYDROmorphone  Injectable 1 milliGRAM(s) IV Push every 4 hours PRN  metroNIDAZOLE  IVPB      metroNIDAZOLE  IVPB 500 milliGRAM(s) IV Intermittent once  ondansetron Injectable 4 milliGRAM(s) IV Push every 6 hours PRN  pantoprazole Infusion 8 mG/Hr IV Continuous <Continuous>  pantoprazole Infusion 8 mG/Hr IV Continuous <Continuous>  piperacillin/tazobactam IVPB.. 3.375 Gram(s) IV Intermittent Once  sodium chloride 0.9%. 1000 milliLiter(s) IV Continuous <Continuous>      Radiology:      Physical exam:  Pt in no acute distress  Psych: normal affect  Resp: CTAB  CVS: S1S2(+)  ABD: bowel sounds (+), soft, non distended, no rebound, no guarding, no rigidity, no skin changes to exam. Mild epigastric tenderness to exam, NGT demonstrates coffee ground emesis. No pain out of proportion to exam  Skin: no adverse skin changes to exam  : stewart CC:Patient is a 75y old  Male who presents with a chief complaint of pneumatosis, portal venous gas (16 Feb 2020 17:22)      Subjective:  Pt seen and examined at bedside with chaperone. Pt is awake, alert, comfortable, cooperative, oriented, pt in no acute distress. Pt denied c/o fever, chills, chest pain, SOB, abd pain, extremity pain or dysfunction, hemoptysis, hematemesis, hematuria, hematochexia, headache, diplopia, vertigo, dizzyness. (+) stewart cath, (+) NGT    ROS:  otherwise as abovementioned ROS    Vital Signs Last 24 Hrs  T(C): 37.3 (16 Feb 2020 20:00), Max: 37.7 (16 Feb 2020 19:00)  T(F): 99.2 (16 Feb 2020 20:00), Max: 99.8 (16 Feb 2020 19:00)  HR: 93 (16 Feb 2020 20:00) (67 - 105)  BP: 115/98 (16 Feb 2020 20:00) (110/58 - 159/94)  BP(mean): 103 (16 Feb 2020 20:00) (93 - 103)  RR: 20 (16 Feb 2020 20:00) (15 - 21)  SpO2: 99% (16 Feb 2020 20:00) (94% - 100%)    Labs:  Pending repeat lactate and cbc at 9pm    CARDIAC MARKERS ( 16 Feb 2020 11:57 )  <0.015 ng/mL / x     / x     / x     / x                                15.0   17.66 )-----------( 221      ( 16 Feb 2020 10:33 )             47.0     CBC Full  -  ( 16 Feb 2020 10:33 )  WBC Count : 17.66 K/uL  RBC Count : 5.23 M/uL  Hemoglobin : 15.0 g/dL  Hematocrit : 47.0 %  Platelet Count - Automated : 221 K/uL  Mean Cell Volume : 89.9 fl  Mean Cell Hemoglobin : 28.7 pg  Mean Cell Hemoglobin Concentration : 31.9 gm/dL  Auto Neutrophil # : 14.18 K/uL  Auto Lymphocyte # : 2.31 K/uL  Auto Monocyte # : 1.06 K/uL  Auto Eosinophil # : 0.01 K/uL  Auto Basophil # : 0.03 K/uL  Auto Neutrophil % : 80.2 %  Auto Lymphocyte % : 13.1 %  Auto Monocyte % : 6.0 %  Auto Eosinophil % : 0.1 %  Auto Basophil % : 0.2 %    02-16    140  |  108  |  24<H>  ----------------------------<  169<H>  3.4<L>   |  23  |  1.52<H>    Ca    9.3      16 Feb 2020 11:57    TPro  8.6<H>  /  Alb  3.5  /  TBili  0.7  /  DBili  x   /  AST  17  /  ALT  18  /  AlkPhos  106  02-16    LIVER FUNCTIONS - ( 16 Feb 2020 11:57 )  Alb: 3.5 g/dL / Pro: 8.6 gm/dL / ALK PHOS: 106 U/L / ALT: 18 U/L / AST: 17 U/L / GGT: x           PT/INR - ( 16 Feb 2020 10:33 )   PT: 11.5 sec;   INR: 1.03 ratio         PTT - ( 16 Feb 2020 10:33 )  PTT:24.7 sec      Meds:  acetaminophen  Suppository .. 650 milliGRAM(s) Rectal every 6 hours PRN  HYDROmorphone  Injectable 0.5 milliGRAM(s) IV Push every 4 hours PRN  HYDROmorphone  Injectable 1 milliGRAM(s) IV Push every 4 hours PRN  metroNIDAZOLE  IVPB      metroNIDAZOLE  IVPB 500 milliGRAM(s) IV Intermittent once  ondansetron Injectable 4 milliGRAM(s) IV Push every 6 hours PRN  pantoprazole Infusion 8 mG/Hr IV Continuous <Continuous>  pantoprazole Infusion 8 mG/Hr IV Continuous <Continuous>  piperacillin/tazobactam IVPB.. 3.375 Gram(s) IV Intermittent Once  sodium chloride 0.9%. 1000 milliLiter(s) IV Continuous <Continuous>      Radiology:      Physical exam:  Pt in no acute distress  Psych: normal affect  Resp: CTAB  CVS: S1S2(+)  ABD: bowel sounds (+), soft, (+) distended, no rebound, no guarding, no rigidity, no skin changes to exam. Mild epigastric tenderness to exam, NGT demonstrates coffee ground output. No pain out of proportion to exam  Skin: no adverse skin changes to exam  : stewart

## 2020-02-16 NOTE — PROGRESS NOTE ADULT - ASSESSMENT
A/P:  Gastric emphysema/pneumatosis  Portal venous gas  Grossly distended distal colon; possible related to underlying adynamic colon of MS disorder  Lactic acidosis  Hematemesis  IV antibiotics  NGT decompression  NPO, aggressive IV hydration  Pending repeat labs  Pt and family explained that pt may require surgical intervention of exploratory laparotomy, bowel resection, ostomy, possible gastric resection for progression of pathology and/or failure to resolve pathology. Pt and family explained the HIGH associated jefferson and post operative risks of morbidity and mortality related to surgical intervention; benefits and alternatives also discussed at length  Pt and family(wife) want to discuss surgical options/interventions at this time, do not want to commit to intervention without both sons present, (one is on his way, the other is here and aware of pt's condition)  Serial abd exams  Pt is DNR/DNI

## 2020-02-16 NOTE — ED ADULT NURSE NOTE - NSIMPLEMENTINTERV_GEN_ALL_ED
Implemented All Fall Risk Interventions:  [place] to call system. Call bell, personal items and telephone within reach. Instruct patient to call for assistance. Room bathroom lighting operational. Non-slip footwear when patient is off stretcher. Physically safe environment: no spills, clutter or unnecessary equipment. Stretcher in lowest position, wheels locked, appropriate side rails in place. Provide visual cue, wrist band, yellow gown, etc. Monitor gait and stability. Monitor for mental status changes and reorient to person, place, and time. Review medications for side effects contributing to fall risk. Reinforce activity limits and safety measures with patient and family.

## 2020-02-16 NOTE — PROGRESS NOTE ADULT - SUBJECTIVE AND OBJECTIVE BOX
Asked to see the patient in the ED by surgery    HPI: Patient is a NH bound male with MS and PD who states he has been vomiting since 2/13 and abd is more distended than usual.  In the ED he was noted to have an elevated lactate and CT A/P revealed gastric pneumatosis.  He had an NGT placed that drained 1 L of black liquid.  Patient seen by surgery and currently hydrating the patient and trending the Lactate.    Patient is alert and oriented, denies pain.    Patient came with an incomplete MOLST that indicated he was a DNR/DNI.  The patient confirmed his advanced directives.  Hes fine with IVF and antibiotics and open to the idea  of surgery if he were to need it.         PAST MEDICAL & SURGICAL HISTORY:  Irritable bowel syndrome with constipation and diarrhea  Parkinsons disease  MS (multiple sclerosis)  S/P placement of cardiac pacemaker  History of brain surgery: for stimulator. 1997,1998      FAMILY HISTORY:  FHx: hypertension: father      Social Hx:    Allergies    Pineapple (Unknown)  sulfa drugs (Unknown)    Intolerances          Weight (kg): 125.2 (02-16 @ 10:07)    ICU Vital Signs Last 24 Hrs  T(C): 36.9 (16 Feb 2020 17:18), Max: 36.9 (16 Feb 2020 10:07)  T(F): 98.4 (16 Feb 2020 17:18), Max: 98.4 (16 Feb 2020 10:07)  HR: 99 (16 Feb 2020 17:18) (67 - 105)  BP: 115/66 (16 Feb 2020 17:18) (110/58 - 159/94)  BP(mean): --  ABP: --  ABP(mean): --  RR: 20 (16 Feb 2020 17:18) (15 - 21)  SpO2: 97% (16 Feb 2020 17:18) (94% - 99%)          I&O's Summary                            15.0   17.66 )-----------( 221      ( 16 Feb 2020 10:33 )             47.0       02-16    140  |  108  |  24<H>  ----------------------------<  169<H>  3.4<L>   |  23  |  1.52<H>    Ca    9.3      16 Feb 2020 11:57    TPro  8.6<H>  /  Alb  3.5  /  TBili  0.7  /  DBili  x   /  AST  17  /  ALT  18  /  AlkPhos  106  02-16      CARDIAC MARKERS ( 16 Feb 2020 11:57 )  <0.015 ng/mL / x     / x     / x     / x                    MEDICATIONS  (STANDING):  lactated ringers Bolus 1000 milliLiter(s) IV Bolus once  lactated ringers. 1000 milliLiter(s) (150 mL/Hr) IV Continuous <Continuous>  pantoprazole Infusion 8 mG/Hr (10 mL/Hr) IV Continuous <Continuous>  pantoprazole Infusion 8 mG/Hr (10 mL/Hr) IV Continuous <Continuous>  piperacillin/tazobactam IVPB.. 3.375 Gram(s) IV Intermittent Once  vancomycin  IVPB 1750 milliGRAM(s) IV Intermittent once    MEDICATIONS  (PRN):  acetaminophen  Suppository .. 650 milliGRAM(s) Rectal every 6 hours PRN Temp greater or equal to 38C (100.4F), Mild Pain (1 - 3)  HYDROmorphone  Injectable 0.5 milliGRAM(s) IV Push every 4 hours PRN Moderate Pain (4 - 6)  HYDROmorphone  Injectable 1 milliGRAM(s) IV Push every 4 hours PRN Severe Pain (7 - 10)  ondansetron Injectable 4 milliGRAM(s) IV Push every 6 hours PRN Nausea      DVT Prophylaxis:     Advanced Directives:  Discussed with:    Visit Information: 45 CC minutes    ** Time is exclusive of billed procedures and/or teaching and/or routine family updates.

## 2020-02-16 NOTE — ED PROVIDER NOTE - NS_ ATTENDINGSCRIBEDETAILS _ED_A_ED_FT
I, Willis Oneill MD,  performed the initial face to face bedside interview with this patient regarding history of present illness, review of symptoms and relevant past medical, social and family history.  I completed an independent physical examination.  I was the initial provider who evaluated this patient.  The history, relevant review of systems, past medical and surgical history, medical decision making, and physical examination was documented by the scribe in my presence and I attest to the accuracy of the documentation.

## 2020-02-16 NOTE — ED ADULT NURSE NOTE - OBJECTIVE STATEMENT
74 y/o F presents to the ED c/o coffee ground emesis and abdominal distention. Pt denies abdominal pain or discomfort. Pt alert and oriented x 3.

## 2020-02-16 NOTE — ED PROVIDER NOTE - CLINICAL SUMMARY MEDICAL DECISION MAKING FREE TEXT BOX
75 year old male with a hx of P, MS, HLD, GI bleed in the past from erosion of the antrum of the stomach presents to the ED for vomiting blood, no abd pain but does have distension. Exam tachycardia, abd distension, brown stool guaiac negative. Concern for GI bleeding. Will obtain labs, CT, Protonix and admit. 75 year old male with a hx of Parkinsons, MS, HLD, GI bleed in the past from erosion of the antrum of the stomach presents to the ED for vomiting blood, no abd pain but does have distension. Exam tachycardia, abd distension, brown stool guaiac negative. Concern for GI bleeding. Will obtain labs, CT, Protonix and admit.

## 2020-02-16 NOTE — ED ADULT TRIAGE NOTE - CHIEF COMPLAINT QUOTE
Pt. to the ED BIB[facility] from [facility] Dark Emesis x 2 this morning- + Abdominal Distention  Hx. of Parkinson's, MS, CAD, and SBO

## 2020-02-16 NOTE — H&P ADULT - NSICDXPASTSURGICALHX_GEN_ALL_CORE_FT
PAST SURGICAL HISTORY:  History of brain surgery for stimulator. 1997,1998    S/P placement of cardiac pacemaker

## 2020-02-17 NOTE — PATIENT PROFILE ADULT - VISION (WITH CORRECTIVE LENSES IF THE PATIENT USUALLY WEARS THEM):
Partially impaired: cannot see medication labels or newsprint, but can see obstacles in path, and the surrounding layout; can count fingers at arm's length/wears glasses. Glasses not with him

## 2020-02-17 NOTE — PROGRESS NOTE ADULT - SUBJECTIVE AND OBJECTIVE BOX
GI    HPI:  more alert  NGT in place, pt removed several times  reports abd bloating but no pain  no vomiting  lactate trending down    ROS  As above  Otherwise unremarkable, all systems reviewed    PE:  Vital Signs Last 24 Hrs  T(C): 37.4 (17 Feb 2020 08:00), Max: 37.7 (16 Feb 2020 19:00)  T(F): 99.3 (17 Feb 2020 08:00), Max: 99.8 (16 Feb 2020 19:00)  HR: 81 (17 Feb 2020 11:00) (67 - 102)  BP: 114/78 (17 Feb 2020 11:00) (93/71 - 159/94)  BP(mean): 90 (17 Feb 2020 11:00) (70 - 127)  RR: 19 (17 Feb 2020 11:00) (11 - 29)  SpO2: 97% (17 Feb 2020 11:00) (94% - 100%)    Constitutional: chronically ill appearing, morbidly obese and with anasarca  Anicteric   Respiratory: CTABL, breathing comfortably  Cardiovascular: S1 and S2, RRR  Gastrointestinal: firm, distended, anasarca and obesity limits exam  Extremities: warm, well perfused, severe edema  Psychiatric: minimal interaction  Neuro: unable to assess  Skin: No rashes or lesions    LABS:                                              12.2   13.51 )-----------( 166      ( 17 Feb 2020 06:27 )             38.8     02-17    144  |  115<H>  |  21  ----------------------------<  111<H>  3.7   |  26  |  1.16    Ca    8.3<L>      17 Feb 2020 06:27    TPro  7.0  /  Alb  2.9<L>  /  TBili  0.9  /  DBili  x   /  AST  29  /  ALT  15  /  AlkPhos  79  02-17    lactate 2.5

## 2020-02-17 NOTE — PROGRESS NOTE ADULT - SUBJECTIVE AND OBJECTIVE BOX
Patient is a 75y old  Male who presents with a chief complaint of pneumatosis, portal venous gas (16 Feb 2020 20:49)      HPI:  74 y/o with a PMHx of IBS, MS, Parkinson's, HLD BIBA from [facility] c/o vomiting x last night. Pt states that the emesis was blood tinged. Has had hematemesis in the past. Denies melena, abd pain. Is able to make a BM, pass gas and tolerate PO. Pt is not on any blood thinners. Nonsmoker. Occasional EtOH use.    Pt seen and examined at bedside with chaperone. Pt is AAOx3, pt in no acute distress; pt's speech is clear though limited secondary to his neurologic pathology (MS, parkinsons) pt states stuttering at baseline. Pt denied c/o fever, chills, chest pain, SOB, abd pain, extremity pain or dysfunction, hemoptysis, hematuria, hematochexia, headache, diplopia, vertigo, dizzyness. Pt states (+) void, (+) bowel function, pt states his abdomen has been distended for approx 1 year and has not caused him any pain in that time (16 Feb 2020 15:07)  2/17: Pt seen and examined, NAD,  temp in 99s, no nausea  NGT dark out put >1500. Passing gas, had a BM. No abdominal pain. HD stable. Serum lactate trending down.  ROS:.  [X] A ten-point review of systems was otherwise negative except as noted.  Systemic:	[ ] Fever	[ ] Chills	[ ] Night sweats    [ ] Fatigue	[ ] Other  [] Cardiovascular:  [] Pulmonary:  [] Renal/Urologic:  [] Gastrointestinal: abdominal pain, vomiting  [] Metabolic:  [] Neurologic:  [] Hematologic:  [] ENT:  [] Ophthalmologic:  [] Musculoskeletal:    [ ] Due to altered mental status/intubation, subjective information were not able to be obtained from the patient. History was obtained, to the extent possible, from review of the chart and collateral sources of information.    All other system review is negative .  PAST MEDICAL & SURGICAL HISTORY:  Irritable bowel syndrome with constipation and diarrhea  Parkinsons disease  MS (multiple sclerosis)  S/P placement of cardiac pacemaker  History of brain surgery: for stimulator. 1997,1998    FAMILY HISTORY:  FHx: hypertension: father    Social History:     Alcohol: Denied  Smoking: Denied  Drug Use: Denied        Vital Signs Last 24 Hrs  T(C): 37.4 (17 Feb 2020 08:00), Max: 37.7 (16 Feb 2020 19:00)  T(F): 99.3 (17 Feb 2020 08:00), Max: 99.8 (16 Feb 2020 19:00)  HR: 81 (17 Feb 2020 11:00) (67 - 102)  BP: 114/78 (17 Feb 2020 11:00) (93/71 - 159/94)  BP(mean): 90 (17 Feb 2020 11:00) (70 - 127)  RR: 19 (17 Feb 2020 11:00) (11 - 29)  SpO2: 97% (17 Feb 2020 11:00) (94% - 100%)    I&O's Summary    16 Feb 2020 07:01  -  17 Feb 2020 07:00  --------------------------------------------------------  IN: 4442 mL / OUT: 2450 mL / NET: 1992 mL        PHYSICAL EXAM:  Constitutional: NAD, GCS: 15/15  AOX3  Eyes:  WNL  ENMT:  WNL  Neck:  WNL, non tender  Back: Non tender  Respiratory: CTABL  Cardiovascular:  S1+S2+0  Gastrointestinal: Soft, mild distension, NT  Genitourinary:  WNL  Extremities: NV intact  Vascular:  Intact  Neurological: No focal neurological deficit,  MS at base line.  Skin: WNL  Musculoskeletal: base line weakness.   Psychiatric: Grossly WNL        Labs:                          12.2   13.51 )-----------( 166      ( 17 Feb 2020 06:27 )             38.8       02-17    144  |  115<H>  |  21  ----------------------------<  111<H>  3.7   |  26  |  1.16    Ca    8.3<L>      17 Feb 2020 06:27    TPro  7.0  /  Alb  2.9<L>  /  TBili  0.9  /  DBili  x   /  AST  29  /  ALT  15  /  AlkPhos  79  02-17      PT/INR - ( 16 Feb 2020 10:33 )   PT: 11.5 sec;   INR: 1.03 ratio         PTT - ( 16 Feb 2020 10:33 )  PTT:24.7 sec    Lactate, Blood: 2.5: Elevated lactate. Consider ordering follow-up lactate to trend. mmol/L (02.17.20 @ 00:33)    < from: CT Angio Abdomen and Pelvis w/ IV Cont (02.16.20 @ 14:06) >  OWEL: Pneumatosis involving the gastric fundus and proximal gastric body with extension of air into the adjacent portal vein walls. Pneumatosis extends into the GE junction. Enhancement of the wall of the distal esophagus. Portal venous gas. Findings compatible with ischemia of the stomach. Large amount of stool within the rectosigmoid colon with marked air distention of the sigmoid colon and descending colon as seen on prior exams. No bowel obstruction. No small large bowel wall thickening. No intraluminal extravasation of contrast to suggest active GI bleeding.  PERITONEUM: No ascites.  VESSELS: Portal venous gas within the left hepatic lobe. Atherosclerotic changes of the aorta without aneurysmal dilatation.  RETROPERITONEUM/LYMPH NODES: No lymphadenopathy.    ABDOMINAL WALL: Within normal limits.  BONES: Degenerative changes. Osteopenia.    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      < end of copied text >

## 2020-02-17 NOTE — PROGRESS NOTE ADULT - RS GEN PE MLT RESP DETAILS PC
breath sounds equal/no rhonchi
breath sounds equal/good air movement/airway patent/respirations non-labored

## 2020-02-17 NOTE — PATIENT PROFILE ADULT - FALLEN IN THE PAST
Patient's daughter called to report     -started with loose bowels 2-3 days ago  -caregiver gave imodium   -patient started throwing up on 2 nights ago  -more loose bowels with bright red blood and yellow spots this am    -caregiver started patient on brat diet   -has hx of colon ca     Education completed per [facility] On [facility] Care Advice including reporting  Follow up with provider within 24 hours. Caregiver verbalized understanding.      Reason for Disposition   [1] MODERATE diarrhea (e.g., 4-6 times / day more than normal) AND [2] present > 48 hours (2 days)    Protocols used: ST DIARRHEA-A-AH       no

## 2020-02-17 NOTE — PROGRESS NOTE ADULT - ASSESSMENT
76 yo M admitted 2/16 suffering from gastric pneumatosis, lactic acidosis, and RAYNA  improved with hydration but remains ill and at risk for deterioration, morbidity and mortality    MS  PD    Plan at this time is for continued care in ICU  HOB 30  NPO, NGT to LIS  PPI infusion  Zosyn, Flagyl - got vancomycin initially (If perforated he will need antifungal coverage)  continue to monitor and consider operative intervention if necessary  Start chemical VTE prophylaxis  Surgery and GI Input appreciated  Supportive care  As per the Patient he is a DNR/DNI

## 2020-02-17 NOTE — PROGRESS NOTE ADULT - ENMT COMMENTS
Mother  Still living? Unknown  Family history of diabetes mellitus, Age at diagnosis: Age Unknown
Dark vomitis in his mouth
NGT to LIS - black liquid in canister

## 2020-02-17 NOTE — PROGRESS NOTE ADULT - ASSESSMENT
75M with chronic colonic ileus due to MS/Parkinson's, presents with portal venous gas, elevated lactate and hematemesis.  CT with distended colon and fecal impaction in rectum. Small bowel however is not distended.  Stomach grossly distended with pneumatosis of the gastric wall on CT. Possibly due to ischemia from increased gastric pressure/gastric outlet obstruction/neuro disease.  Improving.    Recommend:  -continue NGT decompression  -cont IV fluid  -cont IV abx  -cont high dose PPI IV  -cont NPO  -trend lactate  -recommend enema today if remains stable  -EGD is relatively contraindicated due to high risk of gastric perforation  -close surgical follow up  -will follow

## 2020-02-17 NOTE — PROGRESS NOTE ADULT - ASSESSMENT
75 Y old male with multiple medical problems, MS, parkinson's ch colonic dysmotility with possible hematemesis, with gastric pneumatosis and portal venous gas, HD he is stable, has no abdominal pain, lactate is now trending down. Having Gi function.    NPO except ice chips.  IV hydration  IV antibiotics  NGT: continue today   Continue PPI  Mechanical DVt prophylaxis   Serial exams.   Medical service following  Replace electrolyte  Avoid narcotics   PT  GI following   Appreciate ICU care  Will repeat imaging in 24 to 48 hours if not fully resolving  Pt and family ore aware he is a high risk for any intervention if needed , if he gets worse he may need total colectomy, colostomy and possible gastric resection, considering clinical improvement will continue to monitor closely   D/W , Nursing staff.

## 2020-02-17 NOTE — PATIENT PROFILE ADULT - INDICATE TYPE
Do Not Resuscitate (DNR)/Living Will/Health Care Proxy (HCP)/Medical Orders for Life-Sustaining Treatment (MOLST)

## 2020-02-17 NOTE — PROGRESS NOTE ADULT - SUBJECTIVE AND OBJECTIVE BOX
74yo M [facility] resident admitted 2/16 - hx MS and PD - vomiting since 2/13 and abdomen more distended than usual - in ED pt with elevated lactate and CT A/P revealed gastric pneumatosis - NGT placed that drained 1 L of black liquid - evaluated by surgery and plan for hydration and trending of serum lactate.  Patient is alert and oriented, no pain.  MOLST completed by patient instating active DNR/DNI status.  He is agreeable to IVF and antibiotics and open to the idea  of surgery if he were to require it.      admitted to ICU for further care.    above d/w Dr Acevedo who initially evaluated and treated the patient.     2/17: pt feeling better this morning - lactate improved but still elevated.  Serum Cr also improved.  Hemodynamics and respiratory function reasonable.  NGT remains to LIS and still with dark output (1600ml overnight).  Offers no specific complaints.      Allergies    Pineapple (Unknown)  sulfa drugs (Unknown)      ICU Vital Signs Last 24 Hrs  T(C): 37.2 (17 Feb 2020 12:00), Max: 37.7 (16 Feb 2020 19:00)  T(F): 99 (17 Feb 2020 12:00), Max: 99.8 (16 Feb 2020 19:00)  HR: 77 (17 Feb 2020 15:00) (71 - 102)  BP: 122/87 (17 Feb 2020 15:00) (93/71 - 159/94)  BP(mean): 100 (17 Feb 2020 15:00) (70 - 127)  RR: 16 (17 Feb 2020 15:00) (11 - 29)  SpO2: 99% (17 Feb 2020 15:00) (96% - 100%)          I&O's Summary    16 Feb 2020 07:01  -  17 Feb 2020 07:00  --------------------------------------------------------  IN: 4442 mL / OUT: 2450 mL / NET: 1992 mL                              12.2   13.51 )-----------( 166      ( 17 Feb 2020 06:27 )             38.8       02-17    144  |  115<H>  |  21  ----------------------------<  111<H>  3.7   |  26  |  1.16    Ca    8.3<L>      17 Feb 2020 06:27    TPro  7.0  /  Alb  2.9<L>  /  TBili  0.9  /  DBili  x   /  AST  29  /  ALT  15  /  AlkPhos  79  02-17      CAPILLARY BLOOD GLUCOSE          LIVER FUNCTIONS - ( 17 Feb 2020 06:27 )  Alb: 2.9 g/dL / Pro: 7.0 gm/dL / ALK PHOS: 79 U/L / ALT: 15 U/L / AST: 29 U/L / GGT: x             CARDIAC MARKERS ( 16 Feb 2020 11:57 )  <0.015 ng/mL / x     / x     / x     / x            PT/INR - ( 16 Feb 2020 10:33 )   PT: 11.5 sec;   INR: 1.03 ratio         PTT - ( 16 Feb 2020 10:33 )  PTT:24.7 sec            MEDICATIONS  (STANDING):  metroNIDAZOLE  IVPB      metroNIDAZOLE  IVPB 500 milliGRAM(s) IV Intermittent every 8 hours  pantoprazole Infusion 8 mG/Hr (10 mL/Hr) IV Continuous <Continuous>  piperacillin/tazobactam IVPB.. 3.375 Gram(s) IV Intermittent Once  sodium chloride 0.9%. 1000 milliLiter(s) (150 mL/Hr) IV Continuous <Continuous>    MEDICATIONS  (PRN):  acetaminophen  Suppository .. 650 milliGRAM(s) Rectal every 6 hours PRN Temp greater or equal to 38C (100.4F), Mild Pain (1 - 3)  HYDROmorphone  Injectable 0.5 milliGRAM(s) IV Push every 4 hours PRN Moderate Pain (4 - 6)  HYDROmorphone  Injectable 1 milliGRAM(s) IV Push every 4 hours PRN Severe Pain (7 - 10)  ondansetron Injectable 4 milliGRAM(s) IV Push every 6 hours PRN Nausea          DVT Prophylaxis: SQ heparin, venodynes.    Advanced Directives: DNR/DNI  Discussed with: IAMST completed by patient    Visit Information:  SSQ

## 2020-02-18 NOTE — PROGRESS NOTE ADULT - ASSESSMENT
74 yo M admitted 2/16 suffering from gastric pneumatosis, lactic acidosis, and RAYNA    Hypernatremia, hypokalemia, hypophosphatemia   dehydration and intravascular volume depletion    MS  PD    patient has improved with hydration but remains ill and at risk for deterioration, morbidity and mortality    Plan at this time is for continued care in ICU  HOB 30  NPO, NGT to LIS  PPI infusion => IV BID  Zosyn q8h - got vancomycin initially (If perforated he will need antifungal coverage)  continue to monitor and consider operative intervention if necessary  chemical VTE prophylaxis initiated 2/17  Surgery and GI Input appreciated  Supportive care  As per the Patient he is a DNR/DNI - MOLST completed. 74 yo M admitted 2/16 suffering from gastric pneumatosis, lactic acidosis, and RAYNA    Hypernatremia, hypokalemia, hypophosphatemia   dehydration and intravascular volume depletion    MS  PD    patient has improved with hydration but remains ill and at risk for deterioration, morbidity and mortality    Plan at this time is for continued care in ICU  HOB 30  NPO, NGT to LIS  PPI infusion => IV BID  Zosyn q8h - got vancomycin initially (If perforated he will need antifungal coverage)  continue to monitor and consider operative intervention if necessary  chemical VTE prophylaxis initiated 2/17  Surgery and GI Input appreciated  Change IF and monitor serum Na  supplement lytes  Supportive care  As per the Patient he is a DNR/DNI - MOLST completed.

## 2020-02-18 NOTE — PROGRESS NOTE ADULT - SUBJECTIVE AND OBJECTIVE BOX
76yo M [facility] resident admitted 2/16 - hx MS and PD - vomiting since 2/13 and abdomen more distended than usual - in ED pt with elevated lactate and CT A/P revealed gastric pneumatosis - NGT placed that drained 1 L of black liquid - evaluated by surgery and plan for hydration and trending of serum lactate.  Patient is alert and oriented, no pain.  MOLST completed by patient instating active DNR/DNI status.  He is agreeable to IVF and antibiotics and open to the idea  of surgery if he were to require it.      admitted to ICU for further care.    above d/w Dr Acevedo who initially evaluated and treated the patient.     2/17: pt feeling better this morning - lactate improved but still elevated.  Serum Cr also improved.  Hemodynamics and respiratory function reasonable.  NGT remains to LIS and still with dark output (1600ml overnight).  Offers no specific complaints.    2/18:  Continues to improve - lactate, WBC, Cr all better today.  OOB to chair.  No new issues - hemodynamics and respiratory function reasonable.      Allergies    Pineapple (Unknown)  sulfa drugs (Unknown)    Height (cm): 187 (02-17 @ 16:00)  Weight (kg): 125.2 (02-17 @ 16:00)  BMI (kg/m2): 35.8 (02-17 @ 16:00)    ICU Vital Signs Last 24 Hrs  T(C): 36.9 (18 Feb 2020 10:00), Max: 37.3 (17 Feb 2020 16:00)  T(F): 98.5 (18 Feb 2020 10:00), Max: 99.1 (17 Feb 2020 16:00)  HR: 68 (18 Feb 2020 15:00) (59 - 86)  BP: 124/69 (18 Feb 2020 15:00) (104/62 - 130/61)  BP(mean): 87 (18 Feb 2020 15:00) (74 - 114)  RR: 15 (18 Feb 2020 15:00) (12 - 18)  SpO2: 99% (18 Feb 2020 15:00) (95% - 99%)          I&O's Summary    17 Feb 2020 07:01  -  18 Feb 2020 07:00  --------------------------------------------------------  IN: 4575 mL / OUT: 2225 mL / NET: 2350 mL    18 Feb 2020 07:01  -  18 Feb 2020 15:35  --------------------------------------------------------  IN: 400 mL / OUT: 0 mL / NET: 400 mL                              11.5   11.54 )-----------( 139      ( 18 Feb 2020 06:44 )             36.6       02-18    146<H>  |  116<H>  |  15  ----------------------------<  89  3.0<L>   |  24  |  1.09    Ca    8.1<L>      18 Feb 2020 06:44  Phos  1.9     02-18  Mg     1.8     02-18    TPro  6.7  /  Alb  2.7<L>  /  TBili  1.0  /  DBili  x   /  AST  55<H>  /  ALT  16  /  AlkPhos  70  02-18      CAPILLARY BLOOD GLUCOSE      LIVER FUNCTIONS - ( 18 Feb 2020 06:44 )  Alb: 2.7 g/dL / Pro: 6.7 gm/dL / ALK PHOS: 70 U/L / ALT: 16 U/L / AST: 55 U/L / GGT: x             MEDICATIONS  (STANDING):  dextrose 5% + sodium chloride 0.45% with potassium chloride 20 mEq/L 1000 milliLiter(s) (75 mL/Hr) IV Continuous <Continuous>  enoxaparin Injectable 40 milliGRAM(s) SubCutaneous every 12 hours  pantoprazole  Injectable 40 milliGRAM(s) IV Push two times a day  piperacillin/tazobactam IVPB.. 3.375 Gram(s) IV Intermittent every 8 hours    MEDICATIONS  (PRN):  acetaminophen  Suppository .. 650 milliGRAM(s) Rectal every 6 hours PRN Temp greater or equal to 38C (100.4F), Mild Pain (1 - 3)  HYDROmorphone  Injectable 0.5 milliGRAM(s) IV Push every 4 hours PRN Moderate Pain (4 - 6)  ondansetron Injectable 4 milliGRAM(s) IV Push every 6 hours PRN Nausea    Review of Systems:   · Negative General Symptoms	no fever; no chills	  · Negative Skin Symptoms	no rash; no itching	  · Negative Ophthalmologic Symptoms	no diplopia; no photophobia	  · Negative ENMT Symptoms	no dysphagia	  · Negative Respiratory and Thorax Symptoms	no wheezing; no dyspnea; no cough	  · Negative Cardiovascular Symptoms	no chest pain; no palpitations	  · Negative Gastrointestinal Symptoms	no nausea; no vomiting; no diarrhea	  · Negative General Genitourinary Symptoms	no dysuria	  · Negative Musculoskeletal Symptoms	no muscle weakness	  · Negative Neurological Symptoms	no headache; no loss of consciousness	  · Negative Psychiatric Symptoms	no suicidal ideation; no depression; no anxiety	  · Negative Hematology Symptoms	no gum bleeding; no nose bleeding	  · Negative Allergic Reactions	no urticaria	  · Additional ROS	ALL other ROS are NEGATIVE.	    Physical Exam:   · Constitutional	detailed exam	  · Constitutional Details	no distress; obese	  · Eyes	detailed exam	  · Eyes Details	PERRL; EOMI	  · ENMT	detailed exam	  · ENMT Comments	NGT to LIS - black liquid in canister	  · Neck	detailed exam 	  · Neck Details	supple; no JVD	  · Back	detailed exam 	  · Back Details	normal shape	  · Respiratory	detailed exam	  · Respiratory Details	airway patent; breath sounds equal; good air movement; respirations non-labored	  · Cardiovascular	detailed exam	  · Cardiovascular Details	regular rate and rhythm 	  · Gastrointestinal	detailed exam	  · GI Normal	soft; nontender; (+) distention	  · Genitourinary	detailed exam 	  · Genitourinary Comments	Smith catheter in situ	  · Extremities	detailed exam 	  · Extremities Details	no cyanosis; pedal edema	  · Vascular	Equal and normal pulses (carotid, femoral, dorsalis pedis) 	  · Neurological	detailed exam	  · Neurological Details	alert and oriented x 3	  · Motor	LE weakness	  · Sensory	NO gross sensory deficits	  · Skin	detailed exam	  · Skin Details	warm and dry	  · Musculoskeletal	detailed exam	  · Musculoskeletal Details	normal strength	  · Psychiatric	detailed exam	  · Psychiatric Details	normal affect; normal behavior	  · Additional PE	speaks with a stutter.	        DVT Prophylaxis: SQ Lovenox , venodynes.    Advanced Directives: DNR/DNI  Discussed with: LOIS completed by patient    Visit Information:  SSQ

## 2020-02-18 NOTE — PROGRESS NOTE ADULT - SUBJECTIVE AND OBJECTIVE BOX
Patient is a 75y old  Male who presents with a chief complaint of pneumatosis, portal venous gas (17 Feb 2020 15:46)      HPI:  76 y/o with a PMHx of IBS, MS, Parkinson's, HLD BIBA from [facility] c/o vomiting x last night. Pt states that the emesis was blood tinged. Has had hematemesis in the past. Denies melena, abd pain. Is able to make a BM, pass gas and tolerate PO. Pt is not on any blood thinners. Nonsmoker. Occasional EtOH use.    Pt seen and examined at bedside with chaperone. Pt is AAOx3, pt in no acute distress; pt's speech is clear though limited secondary to his neurologic pathology (MS, parkinsons) pt states stuttering at baseline. Pt denied c/o fever, chills, chest pain, SOB, abd pain, extremity pain or dysfunction, hemoptysis, hematuria, hematochexia, headache, diplopia, vertigo, dizzyness. Pt states (+) void, (+) bowel function, pt states his abdomen has been distended for approx 1 year and has not caused him any pain in that time (16 Feb 2020 15:07)  2/18: Pt seen and examined, NAD, no abdominal pain. No nausea, vomiting. No fever. NGT still dark but 500 last 24 hours, Passing gas, diarrhea.  No dysuria, no SOB, no chest pain. HD stable. H/h stable.   ROS:.  [X] A ten-point review of systems was otherwise negative except as noted in HPI.  Systemic:	[ ] Fever	[ ] Chills	[ ] Night sweats    [ ] Fatigue	[ ] Other  [] Cardiovascular:  [] Pulmonary:  [] Renal/Urologic:  [] Gastrointestinal: abdominal pain, vomiting  [] Metabolic:  [] Neurologic:  [] Hematologic:  [] ENT:  [] Ophthalmologic:  [] Musculoskeletal:    [ ] Due to altered mental status/intubation, subjective information were not able to be obtained from the patient. History was obtained, to the extent possible, from review of the chart and collateral sources of information.    All other system review is negative .  PAST MEDICAL & SURGICAL HISTORY:  Irritable bowel syndrome with constipation and diarrhea  Parkinsons disease  MS (multiple sclerosis)  S/P placement of cardiac pacemaker  History of brain surgery: for stimulator. 1997,1998    FAMILY HISTORY:  FHx: hypertension: father    Social History:     Alcohol: Denied  Smoking: Denied  Drug Use: Denied        Vital Signs Last 24 Hrs  T(C): 37.1 (18 Feb 2020 06:00), Max: 37.3 (17 Feb 2020 16:00)  T(F): 98.8 (18 Feb 2020 06:00), Max: 99.1 (17 Feb 2020 16:00)  HR: 77 (18 Feb 2020 11:00) (59 - 80)  BP: 109/73 (18 Feb 2020 11:00) (109/73 - 130/61)  BP(mean): 86 (18 Feb 2020 11:00) (70 - 114)  RR: 16 (18 Feb 2020 11:00) (12 - 21)  SpO2: 96% (18 Feb 2020 10:00) (95% - 99%)    I&O's Summary    17 Feb 2020 07:01  -  18 Feb 2020 07:00  --------------------------------------------------------  IN: 4575 mL / OUT: 2225 mL / NET: 2350 mL    18 Feb 2020 07:01  -  18 Feb 2020 11:38  --------------------------------------------------------  IN: 400 mL / OUT: 0 mL / NET: 400 mL        PHYSICAL EXAM:  Constitutional: NAD, GCS: 15/15  AOX3  Eyes:  WNL  ENMT:  WNL  Neck:  WNL, non tender  Back: Non tender  Respiratory: CTABL  Cardiovascular:  S1+S2+0  Gastrointestinal: Soft, ND , NT  Genitourinary:  WNL  Extremities: NV intact  Vascular:  Intact  Neurological: No focal neurological deficit,  CN, motor and sensory system grossly intact.  Skin: WNL  Musculoskeletal: WNL  Psychiatric: Grossly WNL        Labs:                          11.5   11.54 )-----------( 139      ( 18 Feb 2020 06:44 )             36.6       02-18    146<H>  |  116<H>  |  15  ----------------------------<  89  3.0<L>   |  24  |  1.09    Ca    8.1<L>      18 Feb 2020 06:44  Phos  1.9     02-18  Mg     1.8     02-18    TPro  6.7  /  Alb  2.7<L>  /  TBili  1.0  /  DBili  x   /  AST  55<H>  /  ALT  16  /  AlkPhos  70  02-18      Lactate, Blood: 1.6 mmol/L (02.18.20 @ 06:44)

## 2020-02-18 NOTE — PROGRESS NOTE ADULT - ASSESSMENT
75 Y old male with multiple medical problems, MS, parkinson's ch colonic dysmotility with possible hematemesis, with gastric pneumatosis and portal venous gas, HD he is stable, has no abdominal pain, lactate is now trending down. Having Gi function. Serum lactate normalized.     NPO except ice chips.  IV hydration  IV antibiotics  NGT: Clamping trial   Continue PPI switched to BID   Will start SQ heparin   Serial exams.   Medical service following  Replace electrolyte  Avoid narcotics   PT  GI following   Appreciate ICU care  Will repeat imaging in 24 to 48 hours if not fully resolving  if continue to do well, may D/C NGT in evening  D/W , Nursing staff.

## 2020-02-18 NOTE — PHARMACOTHERAPY INTERVENTION NOTE - COMMENTS
Pt admitted to ICU from [facility], performed med rec by going over the med list provided by nursing home
pt on PPI infusion for gastritis, recommend to change to protonix 40 mg BID
Pt on zosyn and flagyl for intra-abdominal infection, recommend to d/c flagyl as zosyn has adequate coverage
Pt rec'd 1 dose of zosyn on 2/16. Recommend to add zosyn Q8H for intra-abdominal infection

## 2020-02-19 NOTE — PROGRESS NOTE ADULT - ASSESSMENT
76 yo M admitted 2/16 suffering from gastric pneumatosis, lactic acidosis, and RAYNA    Hypernatremia, hypokalemia, hypophosphatemia   dehydration and intravascular volume depletion    MS  PD    patient has improved with hydration but remains ill and at risk for deterioration, morbidity and mortality    Plan at this time is for transfer to Med-Surg floor,  and should also include:  HOB 30  d/c NGT  start diet  PPI IV BID  Zosyn q8h - got vancomycin initially (If perforated he will need antifungal coverage)  continue to monitor and consider operative intervention if necessary  chemical VTE prophylaxis initiated 2/17  Surgery and GI Input appreciated  IVF and monitor serum Na  supplement vianca  Hospitalist consultation for medical management  Supportive care  As per the Patient he is a DNR/DNI - MOLST completed. 76 yo M admitted 2/16 suffering from gastric pneumatosis, lactic acidosis, and RAYNA    Hypernatremia, hypokalemia, hypophosphatemia   dehydration and intravascular volume depletion    MS  PD    patient has improved with hydration but remains ill and at risk for deterioration, morbidity and mortality    Plan at this time is for transfer to Med-Surg floor,  and should also include:  HOB 30  d/c NGT  start diet  PPI IV BID  Zosyn q8h - got vancomycin initially (If perforated he will need antifungal coverage)  continue to monitor and consider operative intervention if necessary  chemical VTE prophylaxis initiated 2/17  Surgery and GI Input appreciated  IVF and monitor serum Na  supplement vianca  Hospitalist consultation for medical management  Supportive care  As per the Patient he is a DNR/DNI - MOLST completed.      case d/w Dr Avalos of the hospitalist service who will consult on the case.

## 2020-02-19 NOTE — PROGRESS NOTE ADULT - ASSESSMENT
A/P:  Gastric emphysema/pneumatosis, resolved clinically  Portal venous gas, resolved clinically  Grossly distended distal colon; possible related to underlying adynamic colon of MS disorder  Lactic acidosis resolved  Hematemesis, resolved  IV antibiotics  Trial of diet  Cont current care and meds  Pt stable from surgical standpoint  Serial abd exams benign  Pt is DNR/DNI

## 2020-02-19 NOTE — PROGRESS NOTE ADULT - SUBJECTIVE AND OBJECTIVE BOX
74yo M NH resident admitted 2/16 - hx MS and PD - vomiting since 2/13 and abdomen more distended than usual - in ED pt with elevated lactate and CT A/P revealed gastric pneumatosis - NGT placed that drained 1 L of black liquid - evaluated by surgery and plan for hydration and trending of serum lactate.  Patient is alert and oriented, no pain.  MOLST completed by patient instating active DNR/DNI status.  He is agreeable to IVF and antibiotics and open to the idea  of surgery if he were to require it.      admitted to ICU for further care.    above d/w Dr Acevedo who initially evaluated and treated the patient.     2/17: pt feeling better this morning - lactate improved but still elevated.  Serum Cr also improved.  Hemodynamics and respiratory function reasonable.  NGT remains to LIS and still with dark output (1600ml overnight).  Offers no specific complaints.    2/18:  Continues to improve - lactate, WBC, Cr all better today.  OOB to chair.  No new issues - hemodynamics and respiratory function reasonable.      2/19: minimal NGT output, CT with improvement as d/w Dr Valadez - hemodynamics and respiratory function reasonable and pt with no new complaints.      Allergies    Pineapple (Unknown)  sulfa drugs (Unknown)        ICU Vital Signs Last 24 Hrs  T(C): 36.7 (19 Feb 2020 09:00), Max: 37.1 (18 Feb 2020 22:00)  T(F): 98.1 (19 Feb 2020 09:00), Max: 98.8 (18 Feb 2020 22:00)  HR: 64 (19 Feb 2020 10:00) (51 - 86)  BP: 150/51 (19 Feb 2020 10:00) (91/54 - 150/51)  BP(mean): 64 (19 Feb 2020 09:00) (64 - 95)  RR: 17 (19 Feb 2020 10:00) (10 - 19)  SpO2: 99% (19 Feb 2020 10:00) (90% - 100%)          I&O's Summary    18 Feb 2020 07:01  -  19 Feb 2020 07:00  --------------------------------------------------------  IN: 2756 mL / OUT: 480 mL / NET: 2276 mL                              11.2   6.76  )-----------( 127      ( 19 Feb 2020 07:17 )             34.1       02-19    144  |  114<H>  |  10  ----------------------------<  95  2.6<LL>   |  25  |  1.07    Ca    7.9<L>      19 Feb 2020 07:17  Phos  1.8     02-19  Mg     2.1     02-19    TPro  6.7  /  Alb  2.7<L>  /  TBili  1.0  /  DBili  x   /  AST  55<H>  /  ALT  16  /  AlkPhos  70  02-18      LIVER FUNCTIONS - ( 18 Feb 2020 06:44 )  Alb: 2.7 g/dL / Pro: 6.7 gm/dL / ALK PHOS: 70 U/L / ALT: 16 U/L / AST: 55 U/L / GGT: x               MEDICATIONS  (STANDING):  dextrose 5% + sodium chloride 0.45% with potassium chloride 20 mEq/L 1000 milliLiter(s) (75 mL/Hr) IV Continuous <Continuous>  enoxaparin Injectable 40 milliGRAM(s) SubCutaneous every 12 hours  pantoprazole  Injectable 40 milliGRAM(s) IV Push two times a day  piperacillin/tazobactam IVPB.. 3.375 Gram(s) IV Intermittent every 8 hours  potassium chloride   Powder 40 milliEquivalent(s) Oral once  potassium chloride  10 mEq/100 mL IVPB 10 milliEquivalent(s) IV Intermittent every 1 hour  potassium phosphate IVPB 15 milliMole(s) IV Intermittent once    MEDICATIONS  (PRN):  acetaminophen  Suppository .. 650 milliGRAM(s) Rectal every 6 hours PRN Temp greater or equal to 38C (100.4F), Mild Pain (1 - 3)  HYDROmorphone  Injectable 0.5 milliGRAM(s) IV Push every 4 hours PRN Moderate Pain (4 - 6)  ondansetron Injectable 4 milliGRAM(s) IV Push every 6 hours PRN Nausea      Review of Systems:   · Negative General Symptoms	no fever; no chills	  · Negative Skin Symptoms	no rash; no itching	  · Negative Ophthalmologic Symptoms	no diplopia; no photophobia	  · Negative ENMT Symptoms	no dysphagia	  · Negative Respiratory and Thorax Symptoms	no wheezing; no dyspnea; no cough	  · Negative Cardiovascular Symptoms	no chest pain; no palpitations	  · Negative Gastrointestinal Symptoms	no nausea; no vomiting; no diarrhea	  · Negative General Genitourinary Symptoms	no dysuria	  · Negative Musculoskeletal Symptoms	no muscle weakness	  · Negative Neurological Symptoms	no headache; no loss of consciousness	  · Negative Psychiatric Symptoms	no suicidal ideation; no depression; no anxiety	  · Negative Hematology Symptoms	no gum bleeding; no nose bleeding	  · Negative Allergic Reactions	no urticaria	  · Additional ROS	ALL other ROS are NEGATIVE.	    Physical Exam:   · Constitutional	detailed exam	  · Constitutional Details	no distress; obese	  · Eyes	detailed exam	  · Eyes Details	PERRL; EOMI	  · ENMT	detailed exam	  · ENMT Comments	NGT to LIS - minimal output	  · Neck	detailed exam 	  · Neck Details	supple; no JVD	  · Back	detailed exam 	  · Back Details	normal shape	  · Respiratory	detailed exam	  · Respiratory Details	airway patent; breath sounds equal; good air movement; respirations non-labored	  · Cardiovascular	detailed exam	  · Cardiovascular Details	regular rate and rhythm 	  · Gastrointestinal	detailed exam	  · GI Normal	soft; nontender; (+) distention	  · Genitourinary	detailed exam 	  · Genitourinary Comments	Smith catheter in situ	  · Extremities	detailed exam 	  · Extremities Details	no cyanosis; pedal edema	  · Vascular	Equal and normal pulses (carotid, femoral, dorsalis pedis) 	  · Neurological	detailed exam	  · Neurological Details	alert and oriented x 3	  · Motor	LE weakness	  · Sensory	NO gross sensory deficits	  · Skin	detailed exam	  · Skin Details	warm and dry	  · Musculoskeletal	detailed exam	  · Musculoskeletal Details	normal strength	  · Psychiatric	detailed exam	  · Psychiatric Details	normal affect; normal behavior	  · Additional PE	speaks with a stutter.	        DVT Prophylaxis: SQ Lovenox , venodynes.    Advanced Directives: DNR/DNI  Discussed with: LOIS completed by patient    Visit Information:  SSQ

## 2020-02-19 NOTE — PROGRESS NOTE ADULT - ASSESSMENT
75M with chronic colonic ileus due to MS/Parkinson's, presents with portal venous gas, elevated lactate and hematemesis.  CT with distended colon and fecal impaction in rectum. Small bowel however is not distended.  Stomach grossly distended with pneumatosis of the gastric wall on CT. Possibly due to ischemia from increased gastric pressure/gastric outlet obstruction/neuro disease.  Improving.    Recommend:  -clears as tolerated, advance as tolerated  -cont IV abx  -cont PPI IV  -surgical follow up  -will follow course and see how he does with PO challenge  -d/w RN at bedside

## 2020-02-19 NOTE — PROGRESS NOTE ADULT - SUBJECTIVE AND OBJECTIVE BOX
GI    HPI:  more alert  NGT removed  no pain  no vomiting  had BM today    ROS  As above  Otherwise unremarkable, all systems reviewed    PE:  Vital Signs Last 24 Hrs  T(C): 36.7 (19 Feb 2020 09:00), Max: 37.1 (18 Feb 2020 22:00)  T(F): 98.1 (19 Feb 2020 09:00), Max: 98.8 (18 Feb 2020 22:00)  HR: 53 (19 Feb 2020 12:00) (51 - 74)  BP: 108/48 (19 Feb 2020 12:00) (91/54 - 150/51)  BP(mean): 68 (19 Feb 2020 12:00) (64 - 95)  RR: 13 (19 Feb 2020 12:00) (10 - 19)  SpO2: 100% (19 Feb 2020 12:00) (90% - 100%)    Constitutional: chronically ill appearing, morbidly obese and with anasarca  Anicteric   Respiratory: CTABL, breathing comfortably  Cardiovascular: S1 and S2, RRR  Gastrointestinal: softer, distended, anasarca and obesity limits exam  Extremities: warm, well perfused, severe edema  Psychiatric: minimal interaction  Neuro: unable to assess  Skin: No rashes or lesions    LABS:                                 11.2   6.76  )-----------( 127      ( 19 Feb 2020 07:17 )             34.1

## 2020-02-19 NOTE — PROGRESS NOTE ADULT - ASSESSMENT
75 Y old male with multiple medical problems, MS, parkinson's ch colonic dysmotility with possible hematemesis, with gastric pneumatosis and portal venous gas, HD he is stable, has no abdominal pain, lactate is now trending down. Having Gi function. Serum lactate normalized.       IV hydration  IV antibiotics  Continue PPI switched to BID   Will start SQ heparin   Serial exams.   Medical service following  Replace electrolyte  Avoid narcotics   PT  GI following   Appreciate ICU care  Will repeat imaging in 24 to 48 hours if not fully resolving  no acute surgical intervention.   D/W , Nursing staff.

## 2020-02-19 NOTE — PROGRESS NOTE ADULT - SUBJECTIVE AND OBJECTIVE BOX
CC:Patient is a 75y old  Male who presents with a chief complaint of pneumatosis, portal venous gas (19 Feb 2020 08:23)      Subjective:  Pt seen and examined at bedside with chaperone. Pt is AAOx3, pt in no acute distress. Pt denied c/o fever, chills, chest pain, SOB, abd pain, N/V/D, extremity pain or dysfunction, hemoptysis, hematemesis, hematuria, hematochexia, headache, diplopia, vertigo, dizzyness. Pt states (+) bowel function    ROS:  otherwise as abovementioned ROS    Vital Signs Last 24 Hrs  T(C): 36.7 (19 Feb 2020 09:00), Max: 37.1 (18 Feb 2020 22:00)  T(F): 98.1 (19 Feb 2020 09:00), Max: 98.8 (18 Feb 2020 22:00)  HR: 64 (19 Feb 2020 10:00) (51 - 86)  BP: 150/51 (19 Feb 2020 10:00) (91/54 - 150/51)  BP(mean): 64 (19 Feb 2020 09:00) (64 - 95)  RR: 17 (19 Feb 2020 10:00) (10 - 19)  SpO2: 99% (19 Feb 2020 10:00) (90% - 100%)    Labs:                                11.2   6.76  )-----------( 127      ( 19 Feb 2020 07:17 )             34.1     CBC Full  -  ( 19 Feb 2020 07:17 )  WBC Count : 6.76 K/uL  RBC Count : 3.85 M/uL  Hemoglobin : 11.2 g/dL  Hematocrit : 34.1 %  Platelet Count - Automated : 127 K/uL  Mean Cell Volume : 88.6 fl  Mean Cell Hemoglobin : 29.1 pg  Mean Cell Hemoglobin Concentration : 32.8 gm/dL  Auto Neutrophil # : x  Auto Lymphocyte # : x  Auto Monocyte # : x  Auto Eosinophil # : x  Auto Basophil # : x  Auto Neutrophil % : x  Auto Lymphocyte % : x  Auto Monocyte % : x  Auto Eosinophil % : x  Auto Basophil % : x    02-19    144  |  114<H>  |  10  ----------------------------<  95  2.6<LL>   |  25  |  1.07    Ca    7.9<L>      19 Feb 2020 07:17  Phos  1.8     02-19  Mg     2.1     02-19    TPro  6.7  /  Alb  2.7<L>  /  TBili  1.0  /  DBili  x   /  AST  55<H>  /  ALT  16  /  AlkPhos  70  02-18    LIVER FUNCTIONS - ( 18 Feb 2020 06:44 )  Alb: 2.7 g/dL / Pro: 6.7 gm/dL / ALK PHOS: 70 U/L / ALT: 16 U/L / AST: 55 U/L / GGT: x                 Meds:  acetaminophen  Suppository .. 650 milliGRAM(s) Rectal every 6 hours PRN  dextrose 5% + sodium chloride 0.45% with potassium chloride 20 mEq/L 1000 milliLiter(s) IV Continuous <Continuous>  enoxaparin Injectable 40 milliGRAM(s) SubCutaneous every 12 hours  HYDROmorphone  Injectable 0.5 milliGRAM(s) IV Push every 4 hours PRN  ondansetron Injectable 4 milliGRAM(s) IV Push every 6 hours PRN  pantoprazole  Injectable 40 milliGRAM(s) IV Push two times a day  piperacillin/tazobactam IVPB.. 3.375 Gram(s) IV Intermittent every 8 hours  potassium chloride  10 mEq/100 mL IVPB 10 milliEquivalent(s) IV Intermittent every 1 hour  potassium phosphate IVPB 15 milliMole(s) IV Intermittent once      Radiology:  < from: CT Abdomen and Pelvis No Cont (02.19.20 @ 09:39) >    EXAM:  CT ABDOMEN AND PELVIS                            PROCEDURE DATE:  02/19/2020          INTERPRETATION:  CT ABDOMEN AND PELVIS    CLINICAL INFORMATION: Generalized Abdominal Pain      COMPARISON: CT abdomen and pelvis 2/16/2020    PROCEDURE:   CT of the Abdomen and Pelvis was performed without intravenous contrast.  Intravenous contrast: None.  Oral contrast: None.  Sagittal and coronal reformats were performed.    FINDINGS:    LOWER CHEST: Small bilateral pleural effusions and bibasilar atelectasis.    LIVER: Resolution of portal venous gas.  BILE DUCTS: Nondilated.  GALLBLADDER: Normal.  SPLEEN: Normal.  PANCREAS: Normal.  ADRENALS: Normal.  KIDNEYS/URETERS: No hydronephrosis or renal calculi. 4 mm right distal ureteral calculus.    BLADDER: Underdistended limiting evaluation.  REPRODUCTIVE ORGANS: Enlarged prostate.    BOWEL: Interval resolution of gastric pneumatosis status post NG tube decompression of the stomach. No dilated small bowel. Severe fecal retention in the rectum with evidence of stercoral proctitis. Diffuse gaseous distention of the colon.  PERITONEUM: No free air or ascites.  VESSELS:  Normal caliber aorta.  RETROPERITONEUM/LYMPH NODES: No adenopathy.    ABDOMINAL WALL: Sacral stimulator.  BONES: No acute bony abnormality.    IMPRESSION:     Interval resolution of gastric pneumatosis status post NG tube decompression of the stomach. Resolution of portal venous gas.    Severe fecal retention in the rectum with evidence of stercoral proctitis. Diffuse gaseous distention of the colon. Colonic ileus as on multiple prior exams.    4 mm right distal ureteral calculus without obstruction.                YOSI THOMPSON   This document has been electronically signed. Feb 19 2020 10:32AM    < end of copied text >      Physical exam:  Pt is aaox3  Pt in no acute distress  Psych: normal affect  Resp: CTAB  CVS: S1S2(+)  ABD: bowel sounds (+), soft, no change to abd distention since admission, no rebound, no guarding, no rigidity, no skin changes to exam. No tenderness to exam  EXT: no calf tenderness or edema to exam b/l, on VTE prophylaxis  Skin: no adverse skin changes to exam

## 2020-02-20 NOTE — PROGRESS NOTE ADULT - ASSESSMENT
A/P:  Gastric emphysema/pneumatosis, resolving/resolved clinically  Portal venous gas, resolved clinically  Grossly distended distal colon; possible related to underlying adynamic colon of MS disorder  Lactic acidosis resolved  Hematemesis, resolved  IV antibiotics  Trial of diet advancement  Cont current care and meds  Pt stable from surgical standpoint  Serial abd exams benign  Pt is DNR/DNI   for d/c planning

## 2020-02-20 NOTE — CONSULT NOTE ADULT - ASSESSMENT
75M with chronic colonic ileus due to MS/Parkinson's, presents with portal venous gas, elevated lactate and hematemesis.  CT with distended colon and fecal impaction in rectum. Small bowel however is not distended.  Stomach grossly distended with pneumatosis of the gastric wall on CT. Possibly due to ischemia from increased gastric pressure/gastric outlet obstruction/neuro disease.    Recommend:  -NGT decompression  -aggressive IV fluid resuscitation  -broad spectrum IV abx  -high dose PPI IV  -NPO  -trend lactate  -serial abd exams  -ICU monitoring  -when more stable should give enema to treat fecal impaction, vs. manual disimpaction-however the portal venous gas does not seem to be colonic in etiology  -EGD is relatively contraindicated due to high risk of gastric perforation  -close surgical follow up  -d/w Dr. Valadez  -d/w Dr. Acevedo at bedside  -d/w RN   -will follow
74 yo man who resides at [facility] w/ PMH of multiple sclerosis, parkinson's s/p deep brain stimulator, IBS, HLD, pacemaker, presented with vomiting and blood tinged emesis. He was admitted on 2/16/20 to surgical service for pneumatosis, portal venous gas, lactic acidosis (lactate peaked at 6.4) and a grossly distended colon (stercoral colitis).  NG tube was placed for decompression and 1 L of black liquid was suctioned. Pt was started on IV antibiotics and IV fluids. pt was in ICU, now stabilized and transferred to the floor. NG tube was removed yesterday and pt has been started on diet. He is currently on IV zosyn. Hospitalist consulted for medical mgmt    1.	Stercoral proctitis/ diffuse colonic distention/ colonic ileus - likely due to underlying MS- bowel regimen, advancement of diet per surgery. d/w Dr. Valadez. cont IV antibiotic and IV fluids. replace lytes. eventual bowel resection in the future   2.	gastric pneumatosis - s/p NG tube, now resolved  3.	portal venous gas - resolved on repeat CT scan  4.	Hypokalemia - supplement  5.	hypophosphatemia - supplement  6.	lactic acidosis resolved  7.	MS - chronic, no acute weakness  8.	PD - cont meds  9.	chronic LLE edema - chronic issue, reviewed prior records, no clinical s/sx of dvt, prior venous u/s in the past have been neg for dvt.  10.	leukocytosis - resolved    dispo: med/surg. discharge planning per surgical team  code status: DNR    Thank you for the consult, will follow.

## 2020-02-20 NOTE — PROGRESS NOTE ADULT - SUBJECTIVE AND OBJECTIVE BOX
CC:Patient is a 75y old  Male who presents with a chief complaint of pneumatosis, portal venous gas (19 Feb 2020 13:59)      Subjective:  Pt seen and examined at bedside with chaperone. Pt is AAOx3, pt in no acute distress. Pt denied c/o fever, chills, chest pain, SOB, abd pain, N/V/D, extremity pain or dysfunction, hemoptysis, hematemesis, hematuria, hematochexia, headache, diplopia, vertigo, dizzyness. Pt tolerating diet, (+) void, (+) bowel function    ROS:  otherwise as abovementioned ROS    Vital Signs Last 24 Hrs  T(C): 36.9 (20 Feb 2020 05:03), Max: 37 (19 Feb 2020 17:00)  T(F): 98.4 (20 Feb 2020 05:03), Max: 98.6 (19 Feb 2020 17:00)  HR: 58 (20 Feb 2020 05:03) (53 - 74)  BP: 122/55 (20 Feb 2020 05:03) (93/61 - 122/55)  BP(mean): 72 (19 Feb 2020 14:28) (68 - 72)  RR: 18 (20 Feb 2020 05:03) (13 - 20)  SpO2: 97% (20 Feb 2020 05:03) (97% - 100%)    Labs:                                10.6   5.56  )-----------( 132      ( 20 Feb 2020 06:28 )             32.8     CBC Full  -  ( 20 Feb 2020 06:28 )  WBC Count : 5.56 K/uL  RBC Count : 3.70 M/uL  Hemoglobin : 10.6 g/dL  Hematocrit : 32.8 %  Platelet Count - Automated : 132 K/uL  Mean Cell Volume : 88.6 fl  Mean Cell Hemoglobin : 28.6 pg  Mean Cell Hemoglobin Concentration : 32.3 gm/dL  Auto Neutrophil # : x  Auto Lymphocyte # : x  Auto Monocyte # : x  Auto Eosinophil # : x  Auto Basophil # : x  Auto Neutrophil % : x  Auto Lymphocyte % : x  Auto Monocyte % : x  Auto Eosinophil % : x  Auto Basophil % : x    02-20    144  |  115<H>  |  6<L>  ----------------------------<  107<H>  3.2<L>   |  23  |  0.90    Ca    7.9<L>      20 Feb 2020 06:28  Phos  2.2     02-20  Mg     1.9     02-20              Meds:  acetaminophen  Suppository .. 650 milliGRAM(s) Rectal every 6 hours PRN  dextrose 5% + sodium chloride 0.45% with potassium chloride 20 mEq/L 1000 milliLiter(s) IV Continuous <Continuous>  enoxaparin Injectable 40 milliGRAM(s) SubCutaneous every 12 hours  HYDROmorphone  Injectable 0.5 milliGRAM(s) IV Push every 4 hours PRN  ondansetron Injectable 4 milliGRAM(s) IV Push every 6 hours PRN  pantoprazole  Injectable 40 milliGRAM(s) IV Push two times a day  piperacillin/tazobactam IVPB.. 3.375 Gram(s) IV Intermittent every 8 hours      Radiology:      Physical exam:  Pt is aaox3  Pt in no acute distress  Psych: normal affect  Resp: CTAB  CVS: S1S2(+)  ABD: bowel sounds (+), soft, no change to abd distention since admission, no rebound, no guarding, no rigidity, no skin changes to exam. No tenderness to exam  EXT: no calf tenderness or edema to exam b/l, on VTE prophylaxis  Skin: no adverse skin changes to exam

## 2020-02-20 NOTE — CONSULT NOTE ADULT - SUBJECTIVE AND OBJECTIVE BOX
CC: pneumatosis, portal venous gas, abd pain and distention  HPI: 76 yo man who resides at [facility] w/ PMH of multiple sclerosis, parkinson's s/p deep brain stimulator, IBS, HLD, pacemaker, presented with vomiting and blood tinged emesis. He was admitted on 2/16/20 to surgical service for pneumatosis, portal venous gas, lactic acidosis (lactate peaked at 6.4) and a grossly distended colon (stercoral colitis).  NG tube was placed for decompression and 1 L of black liquid was suctioned. Pt was started on IV antibiotics and IV fluids. pt was in ICU, now stabilized and transferred to the floor. NG tube was removed yesterday and pt has been started on diet. He is currently on IV zosyn. Hospitalist consulted for medical mgmt. Pt is in bed, awake and alert. denies any nausea or vomiting at this point and is tolerating diet. denies any cardiopulmonary symptoms. no dizziness or lightheadedness. +flatus and BM. has chronic LLE edema. c/o pain in L foot which somewhat improved after loosening the tight band on the heel protecters/ pressure relief boots. has had previous admission for the same colonic issues.     ROS: 13 point system reviewed. all negative except as noted in HPI.     PAST MEDICAL & SURGICAL HISTORY:  Irritable bowel syndrome with constipation and diarrhea  Parkinson's disease  MS (multiple sclerosis)  S/P placement of cardiac pacemaker  History of brain surgery: for stimulator. 1997,1998     FAMILY HISTORY:  FHx: hypertension: father    Social History:  h/o social etoh, no reported tobacco or illicit drug use (16 Feb 2020 15:07)    Allergies    Pineapple (Unknown)  sulfa drugs (Unknown)    Home Medications:  atorvastatin 10 mg oral tablet: 1 tab(s) orally once a day (at bedtime) (18 Feb 2020 10:06)  CeleXA 20 mg oral tablet: 1 tab(s) orally once a day (18 Feb 2020 10:06)  cholestyramine 4 g/5 g oral powder for reconstitution: 1 packet(s) orally 2 times a day (9 am and 5pm) for chronic diarrhea (18 Feb 2020 10:06)  furosemide 40 mg oral tablet: 1 tab(s) orally once a day (18 Feb 2020 10:06)  Melatonin 3 mg oral tablet: 1 tab(s) orally once (at bedtime) (18 Feb 2020 10:06)  Milk of Magnesia 8% oral suspension: 30 milliliter(s) orally once a day (at bedtime), As Needed (18 Feb 2020 10:06)  pantoprazole 40 mg oral delayed release tablet: 1 tab(s) orally once a day (18 Feb 2020 10:06)  potassium chloride 20 mEq oral tablet, extended release: 1 tab(s) orally 2 times a day (18 Feb 2020 10:06)  Proscar 5 mg oral tablet: 1 tab(s) orally once a day (18 Feb 2020 10:06)  Vitamin D3 50,000 intl units oral capsule: 1 cap(s) orally once a month (on every 28th) (18 Feb 2020 10:06)    T(C): 36.4 (02-20-20 @ 11:29), Max: 37 (02-19-20 @ 17:00)  HR: 55 (02-20-20 @ 11:29) (55 - 74)  BP: 98/46 (02-20-20 @ 11:29) (96/59 - 122/55)  RR: 18 (02-20-20 @ 11:29) (17 - 20)  SpO2: 98% (02-20-20 @ 11:29) (97% - 100%)    General:  Alert, cooperative, no distress, appears stated age.  Head:  Normocephalic, without obvious abnormality, atraumatic.  Eyes:  Conjunctivae/corneas clear. PERRL, EOMs intact.   Nose: Nares normal.  Mucosa normal. No drainage  Throat: Lips, mucosa, and tongue normal. gums normal.  Neck: Supple, symmetrical, trachea midline  Back:   unable to examine  Lungs:   Good effort. Clear to auscultation bilaterally.  Chest wall:  No tenderness or deformity.  Heart:  Regular rate and rhythm, S1, S2 normal, no murmur, click, rub or gallop.  Abdomen:   Soft, non-tender. ++distended. No R/R/G. Bowel sounds normal. unable to assess for HSM due to severe distention.  Psych: Alert and oriented x 3. Appropriate mood/ affect.  Extremities: atraumatic. no cyanosis. +LLE edema, no edema in RLE  Pulses: 2+ and symmetric all extremities.  Skin: Skin color, texture, turgor normal. No rashes or lesions but unable to examine pt's back/sacrum  Neurologic: fluent speech, no facial droop, EOMI. moves b/l UE.     LABS: All Labs Reviewed:                        10.6   5.56  )-----------( 132      ( 20 Feb 2020 06:28 )             32.8     02-20    144  |  115<H>  |  6<L>  ----------------------------<  107<H>  3.2<L>   |  23  |  0.90    Ca    7.9<L>      20 Feb 2020 06:28  Phos  2.2     02-20  Mg     1.9     02-20      Blood Culture: 02-16 @ 14:42  Organism --  Gram Stain Blood -- Gram Stain --  Specimen Source .Blood None  Culture-Blood --    02-16 @ 14:37  Organism --  Gram Stain Blood -- Gram Stain --  Specimen Source .Blood None  Culture-Blood --      RADIOLOGY/EKG: CC: pneumatosis, portal venous gas, abd pain and distention  HPI: 76 yo man who resides at [facility] w/ PMH of multiple sclerosis, parkinson's s/p deep brain stimulator, IBS, HLD, pacemaker, presented with vomiting and blood tinged emesis. He was admitted on 2/16/20 to surgical service for pneumatosis, portal venous gas, lactic acidosis (lactate peaked at 6.4) and a grossly distended colon (stercoral colitis).  NG tube was placed for decompression and 1 L of black liquid was suctioned. Pt was started on IV antibiotics and IV fluids. pt was in ICU, now stabilized and transferred to the floor. NG tube was removed yesterday and pt has been started on diet. He is currently on IV zosyn. Hospitalist consulted for medical mgmt. Pt is in bed, awake and alert. denies any nausea or vomiting at this point and is tolerating diet. denies any cardiopulmonary symptoms. no dizziness or lightheadedness. +flatus and BM. has chronic LLE edema. c/o pain in L foot which somewhat improved after loosening the tight band on the heel protecters/ pressure relief boots. has had previous admission for the same colonic issues.     ROS: 13 point system reviewed. all negative except as noted in HPI.     PAST MEDICAL & SURGICAL HISTORY:  Irritable bowel syndrome with constipation and diarrhea  Parkinson's disease  MS (multiple sclerosis)  S/P placement of cardiac pacemaker  History of brain surgery: for stimulator. 1997,1998     FAMILY HISTORY:  FHx: hypertension: father    Social History:  h/o social etoh, no reported tobacco or illicit drug use (16 Feb 2020 15:07)    Allergies    Pineapple (Unknown)  sulfa drugs (Unknown)    Home Medications:  atorvastatin 10 mg oral tablet: 1 tab(s) orally once a day (at bedtime) (18 Feb 2020 10:06)  CeleXA 20 mg oral tablet: 1 tab(s) orally once a day (18 Feb 2020 10:06)  cholestyramine 4 g/5 g oral powder for reconstitution: 1 packet(s) orally 2 times a day (9 am and 5pm) for chronic diarrhea (18 Feb 2020 10:06)  furosemide 40 mg oral tablet: 1 tab(s) orally once a day (18 Feb 2020 10:06)  Melatonin 3 mg oral tablet: 1 tab(s) orally once (at bedtime) (18 Feb 2020 10:06)  Milk of Magnesia 8% oral suspension: 30 milliliter(s) orally once a day (at bedtime), As Needed (18 Feb 2020 10:06)  pantoprazole 40 mg oral delayed release tablet: 1 tab(s) orally once a day (18 Feb 2020 10:06)  potassium chloride 20 mEq oral tablet, extended release: 1 tab(s) orally 2 times a day (18 Feb 2020 10:06)  Proscar 5 mg oral tablet: 1 tab(s) orally once a day (18 Feb 2020 10:06)  Vitamin D3 50,000 intl units oral capsule: 1 cap(s) orally once a month (on every 28th) (18 Feb 2020 10:06)    T(C): 36.4 (02-20-20 @ 11:29), Max: 37 (02-19-20 @ 17:00)  HR: 55 (02-20-20 @ 11:29) (55 - 74)  BP: 98/46 (02-20-20 @ 11:29) (96/59 - 122/55)  RR: 18 (02-20-20 @ 11:29) (17 - 20)  SpO2: 98% (02-20-20 @ 11:29) (97% - 100%)    General:  Alert, cooperative, no distress, appears stated age.  Head:  Normocephalic, without obvious abnormality, atraumatic.  Eyes:  Conjunctivae/corneas clear. PERRL, EOMs intact.   Nose: Nares normal.  Mucosa normal. No drainage  Throat: Lips, mucosa, and tongue normal. gums normal.  Neck: Supple, symmetrical, trachea midline  Back:   unable to examine  Lungs:   Good effort. Clear to auscultation bilaterally.  Chest wall:  No tenderness or deformity.  Heart:  Regular rate and rhythm, S1, S2 normal, no murmur, click, rub or gallop.  Abdomen:   adiposic, Soft, non-tender. ++distended. No R/R/G. Bowel sounds normal. unable to assess for HSM due to severe distention.  Psych: Alert and oriented x 3. Appropriate mood/ affect.  Extremities: atraumatic. no cyanosis. +LLE edema, no edema in RLE  Pulses: 2+ and symmetric all extremities.  Skin: Skin color, texture, turgor normal. No rashes or lesions but unable to examine pt's back/sacrum  Neurologic: fluent speech, no facial droop, EOMI. moves b/l UE.     LABS: All Labs Reviewed:                        10.6   5.56  )-----------( 132      ( 20 Feb 2020 06:28 )             32.8     02-20    144  |  115<H>  |  6<L>  ----------------------------<  107<H>  3.2<L>   |  23  |  0.90    Ca    7.9<L>      20 Feb 2020 06:28  Phos  2.2     02-20  Mg     1.9     02-20      Blood Culture: 02-16 @ 14:42  Organism --  Gram Stain Blood -- Gram Stain --  Specimen Source .Blood None  Culture-Blood --    02-16 @ 14:37  Organism --  Gram Stain Blood -- Gram Stain --  Specimen Source .Blood None  Culture-Blood --      RADIOLOGY/EKG:    < from: CT Abdomen and Pelvis No Cont (02.19.20 @ 09:39) >  IMPRESSION:     Interval resolution of gastric pneumatosis status post NG tube decompression of the stomach. Resolution of portal venous gas.    Severe fecal retention in the rectum with evidence of stercoral proctitis. Diffuse gaseous distention of the colon. Colonic ileus as on multiple prior exams.    4 mm right distal ureteral calculus without obstruction.    < end of copied text >

## 2020-02-21 NOTE — DISCHARGE NOTE PROVIDER - HOSPITAL COURSE
Pt with multiple medical problems, ch colonic dysmotility presented with sever emphysematous gastritis, resolved conservatively, doing well, HD stable, tolerating diet, distended but having Gi function. Cleared by medicine and GI. Out patietn colonoscopy .

## 2020-02-21 NOTE — PROGRESS NOTE ADULT - SUBJECTIVE AND OBJECTIVE BOX
GI    HPI:  looks well  lucien reg diet  no pain  no vomiting  had BM today, states chronic distension is not painful    ROS  As above  Otherwise unremarkable, all systems reviewed    PE:  Vital Signs Last 24 Hrs  T(C): 36.6 (21 Feb 2020 06:42), Max: 37.2 (20 Feb 2020 21:04)  T(F): 97.9 (21 Feb 2020 06:42), Max: 99 (20 Feb 2020 21:04)  HR: 67 (21 Feb 2020 06:42) (55 - 78)  BP: 129/88 (21 Feb 2020 06:42) (98/46 - 129/88)  BP(mean): --  RR: 19 (21 Feb 2020 06:42) (18 - 19)  SpO2: 97% (21 Feb 2020 06:42) (95% - 98%)    Constitutional: chronically ill appearing, morbidly obese and with anasarca  Anicteric   Respiratory: CTABL, breathing comfortably  Cardiovascular: S1 and S2, RRR  Gastrointestinal: softer, distended, anasarca and obesity limits exam  Extremities: warm, well perfused, severe edema  Psychiatric: more interaction today  Neuro: unable to assess  Skin: No rashes or lesions    LABS:                                                    10.6   5.56  )-----------( 132      ( 20 Feb 2020 06:28 )             32.8

## 2020-02-21 NOTE — DISCHARGE NOTE PROVIDER - NSDCCPCAREPLAN_GEN_ALL_CORE_FT
PRINCIPAL DISCHARGE DIAGNOSIS  Diagnosis: Emphysematous gastritis  Assessment and Plan of Treatment: Resolved

## 2020-02-21 NOTE — PROGRESS NOTE ADULT - ASSESSMENT
76 yo man who resides at [facility] w/ PMH of multiple sclerosis, parkinson's s/p deep brain stimulator, IBS, HLD, pacemaker, presented with vomiting and blood tinged emesis. He was admitted on 2/16/20 to surgical service for pneumatosis, portal venous gas, lactic acidosis (lactate peaked at 6.4) and a grossly distended colon (stercoral colitis).  NG tube was placed for decompression and 1 L of black liquid was suctioned. Pt was started on IV antibiotics and IV fluids. pt was in ICU, now stabilized and transferred to the floor. NG tube was removed yesterday and pt has been started on diet. He is currently on IV zosyn. Hospitalist consulted for medical mgmt    1.	Stercoral proctitis/ diffuse colonic distention/ colonic ileus - likely due to underlying MS- bowel regimen, advancement of diet per surgery. d/w Dr. Valadez. cont IV antibiotic and IV fluids. replace lytes. eventual bowel resection in the future   2.	gastric pneumatosis - s/p NG tube, now resolved  3.	portal venous gas - resolved on repeat CT scan  4.	Hypokalemia - supplement  5.	hypophosphatemia - supplement  6.	lactic acidosis resolved  7.	MS - chronic, no acute weakness  8.	PD - cont meds  9.	chronic LLE edema - chronic issue, reviewed prior records, no clinical s/sx of dvt, prior venous u/s in the past have been neg for dvt.  10.	leukocytosis - resolved    dispo: med/surg. discharge planning per surgical team  code status: DNR    Thank you for the consult. 74 yo man who resides at [facility] w/ PMH of multiple sclerosis, parkinson's s/p deep brain stimulator, IBS, HLD, pacemaker, presented with vomiting and blood tinged emesis. He was admitted on 2/16/20 to surgical service for pneumatosis, portal venous gas, lactic acidosis (lactate peaked at 6.4) and a grossly distended colon (stercoral colitis).  NG tube was placed for decompression and 1 L of black liquid was suctioned. Pt was started on IV antibiotics and IV fluids. pt was in ICU, now stabilized and transferred to the floor. NG tube was removed yesterday and pt has been started on diet. He is currently on IV zosyn. Hospitalist consulted for medical mgmt    1.	Stercoral proctitis/ diffuse colonic distention/ colonic ileus - likely due to underlying MS- bowel regimen, advancement of diet per surgery. d/w Dr. Valadez. cont IV antibiotic and IV fluids. replace lytes. eventual bowel resection in the future   2.	gastric pneumatosis - s/p NG tube, now resolved  3.	portal venous gas - resolved on repeat CT scan  4.	Hypokalemia - supplement  5.	hypophosphatemia - supplemented  6.	lactic acidosis resolved  7.	MS - chronic, no acute weakness  8.	PD - cont meds  9.	chronic LLE edema - chronic issue, reviewed prior records, no clinical s/sx of dvt, prior venous u/s in the past have been neg for dvt.  10.	leukocytosis - resolved    dispo: med/surg. discharge planning per surgical team  code status: DNR    Thank you for the consult.

## 2020-02-21 NOTE — DISCHARGE NOTE PROVIDER - PROVIDER TOKENS
PROVIDER:[TOKEN:[[id number]],FOLLOWUP:[2 weeks]],FREE:[LAST:[PMD],PHONE:[(   )    -],FAX:[(   )    -]]

## 2020-02-21 NOTE — DISCHARGE NOTE PROVIDER - CARE PROVIDER_API CALL
Orlin Boston)  Gastroenterology; Internal Medicine  [address], [address]  [place] [address]  Phone: [phone]  Fax: [phone]  Follow Up Time: 2 weeks    PMD,   Phone: (   )    -  Fax: (   )    -  Follow Up Time:

## 2020-02-21 NOTE — PROGRESS NOTE ADULT - SUBJECTIVE AND OBJECTIVE BOX
CC/reason for follow up: *    S: *    ROS: no chest pain, no dyspnea    T(C): 36.6 (02-21-20 @ 06:42), Max: 37.2 (02-20-20 @ 21:04)  HR: 67 (02-21-20 @ 06:42) (55 - 78)  BP: 129/88 (02-21-20 @ 06:42) (98/46 - 129/88)  RR: 19 (02-21-20 @ 06:42) (18 - 19)  SpO2: 97% (02-21-20 @ 06:42) (95% - 98%)    Gen - Pleasant, cooperative, in no acute distress  HEENT- PERRL, moist mucus membranes, OP clear  CV - RRR, No m/r/g, +pulses, * edema  Lungs - Good effort, Clear to auscultation bilaterally  Abdomen - Soft, nontender, nondistended, +BS, No rebound/rigidity/guarding  Ext - No cyanosis/clubbing.   Skin - No rashes, No jaundice  Psych- Alert & oriented x 3  Neuro- *    MEDICATIONS  (STANDING):  dextrose 5% + sodium chloride 0.45% with potassium chloride 20 mEq/L 1000 milliLiter(s) (75 mL/Hr) IV Continuous <Continuous>  enoxaparin Injectable 40 milliGRAM(s) SubCutaneous every 12 hours  pantoprazole  Injectable 40 milliGRAM(s) IV Push two times a day  piperacillin/tazobactam IVPB.. 3.375 Gram(s) IV Intermittent every 8 hours    MEDICATIONS  (PRN):  acetaminophen  Suppository .. 650 milliGRAM(s) Rectal every 6 hours PRN Temp greater or equal to 38C (100.4F), Mild Pain (1 - 3)  HYDROmorphone  Injectable 0.5 milliGRAM(s) IV Push every 4 hours PRN Moderate Pain (4 - 6)  ondansetron Injectable 4 milliGRAM(s) IV Push every 6 hours PRN Nausea      Diagnostic studies: personally reviewed  LABS: All Labs Reviewed:                        10.6   5.56  )-----------( 132      ( 20 Feb 2020 06:28 )             32.8     02-20    144  |  115<H>  |  6<L>  ----------------------------<  107<H>  3.2<L>   |  23  |  0.90    Ca    7.9<L>      20 Feb 2020 06:28  Phos  2.2     02-20  Mg     1.9     02-20              Blood Culture: 02-16 @ 14:42  Organism --  Gram Stain Blood -- Gram Stain --  Specimen Source .Blood None  Culture-Blood --    02-16 @ 14:37  Organism --  Gram Stain Blood -- Gram Stain --  Specimen Source .Blood None  Culture-Blood --      RADIOLOGY/EKG: CC/reason for follow up: abd distention, medical mgmt    S: pt is having BM's. no complaints    ROS: no chest pain, no dyspnea    T(C): 36.6 (02-21-20 @ 06:42), Max: 37.2 (02-20-20 @ 21:04)  HR: 67 (02-21-20 @ 06:42) (55 - 78)  BP: 129/88 (02-21-20 @ 06:42) (98/46 - 129/88)  RR: 19 (02-21-20 @ 06:42) (18 - 19)  SpO2: 97% (02-21-20 @ 06:42) (95% - 98%)    Gen - Pleasant, cooperative, in no acute distress  HEENT- PERRL, moist mucus membranes, OP clear  CV - RRR, No m/r/g, +pulses, chronic LLE edema, mild RLE edema  Lungs - Good effort, Clear to auscultation bilaterally  Abdomen - Soft, nontender, nondistended, +BS, No rebound/rigidity/guarding  Ext - No cyanosis/clubbing.   Skin - No rashes but unable to examine pt's back. No jaundice  Psych- Alert & oriented x 3  Neuro- fluent speech, no facial droop, EOMI. moves b/l UE    MEDICATIONS  (STANDING):  dextrose 5% + sodium chloride 0.45% with potassium chloride 20 mEq/L 1000 milliLiter(s) (75 mL/Hr) IV Continuous <Continuous>  enoxaparin Injectable 40 milliGRAM(s) SubCutaneous every 12 hours  pantoprazole  Injectable 40 milliGRAM(s) IV Push two times a day  piperacillin/tazobactam IVPB.. 3.375 Gram(s) IV Intermittent every 8 hours    MEDICATIONS  (PRN):  acetaminophen  Suppository .. 650 milliGRAM(s) Rectal every 6 hours PRN Temp greater or equal to 38C (100.4F), Mild Pain (1 - 3)  HYDROmorphone  Injectable 0.5 milliGRAM(s) IV Push every 4 hours PRN Moderate Pain (4 - 6)  ondansetron Injectable 4 milliGRAM(s) IV Push every 6 hours PRN Nausea      Diagnostic studies: personally reviewed  LABS: All Labs Reviewed:                        10.6   5.56  )-----------( 132      ( 20 Feb 2020 06:28 )             32.8     02-20    144  |  115<H>  |  6<L>  ----------------------------<  107<H>  3.2<L>   |  23  |  0.90    Ca    7.9<L>      20 Feb 2020 06:28  Phos  2.2     02-20  Mg     1.9     02-20              Blood Culture: 02-16 @ 14:42  Organism --  Gram Stain Blood -- Gram Stain --  Specimen Source .Blood None  Culture-Blood --    02-16 @ 14:37  Organism --  Gram Stain Blood -- Gram Stain --  Specimen Source .Blood None  Culture-Blood --      RADIOLOGY/EKG:  < from: CT Abdomen and Pelvis No Cont (02.19.20 @ 09:39) >  IMPRESSION:     Interval resolution of gastric pneumatosis status post NG tube decompression of the stomach. Resolution of portal venous gas.    Severe fecal retention in the rectum with evidence of stercoral proctitis. Diffuse gaseous distention of the colon. Colonic ileus as on multiple prior exams.    4 mm right distal ureteral calculus without obstruction.    < end of copied text >

## 2020-02-21 NOTE — DISCHARGE NOTE PROVIDER - NSDCMRMEDTOKEN_GEN_ALL_CORE_FT
atorvastatin 10 mg oral tablet: 1 tab(s) orally once a day (at bedtime)  CeleXA 20 mg oral tablet: 1 tab(s) orally once a day  cholestyramine 4 g/5 g oral powder for reconstitution: 1 packet(s) orally 2 times a day (9 am and 5pm) for chronic diarrhea  furosemide 40 mg oral tablet: 1 tab(s) orally once a day  Melatonin 3 mg oral tablet: 1 tab(s) orally once (at bedtime)  Milk of Magnesia 8% oral suspension: 30 milliliter(s) orally once a day (at bedtime), As Needed  pantoprazole 40 mg oral delayed release tablet: 1 tab(s) orally once a day  potassium chloride 20 mEq oral tablet, extended release: 1 tab(s) orally 2 times a day  Proscar 5 mg oral tablet: 1 tab(s) orally once a day  Vitamin D3 50,000 intl units oral capsule: 1 cap(s) orally once a month (on every 28th)

## 2020-02-21 NOTE — PROGRESS NOTE ADULT - REASON FOR ADMISSION
pneumatosis, portal venous gas

## 2020-02-21 NOTE — PROGRESS NOTE ADULT - ASSESSMENT
75M with chronic colonic ileus due to MS/Parkinson's, presents with portal venous gas, elevated lactate and hematemesis.  CT with distended colon and fecal impaction in rectum. Small bowel however is not distended.  Stomach grossly distended with pneumatosis of the gastric wall on CT. Possibly due to ischemia from increased gastric pressure/gastric outlet obstruction/neuro disease.  Improved now.    Recommend:  -reg diet as lucien  -recommend cont IV abx for total 7 days then stop, unless extended course desired per another consultant  -cont PPI BID, can change to PO  -no interventions planned for gastric pathology unless recurs  -colonic ileus is chronic and patient comfortable, no emergent indication for intervention  -dispo planning  -d/w Dr. Garcia at bedside

## 2020-06-19 NOTE — H&P ADULT - NSHPPHYSICALEXAM_GEN_ALL_CORE
Pt is AAOx3  Pt in no acute distress  Neuro: CNII-XII grossly intact to limited exam  Psych: normal affect  HEENT: Normocephalic, atraumatic, SHOSHANA, EOM wnl  Neck: No crepitus, no ecchymosis, no hematoma, to exam, no JVD, no tracheal deviation  Cardiovascular: S1S2 Present  Respiratory: Respiratory Effort normal; no wheezes, rales or rhonchi to exam, CTAB  ABD: bowel sounds (+), soft, nontender to exam, (+) distended, no rebound, no guarding, no skin changes to exam. No pelvic instability to exam, no skin changes, negative caro's sign to exam  Musculoskeletal: All digits are warm and well perfused. Pt has good capillary refill to digits, (+) left calf edema without tenderness to exam (chronic lymphadema).  Skin: no jaundice or icteric sclera to exam b/l, no skin changes to exam otherwise    ICU Vital Signs Last 24 Hrs  T(C): 36.8 (19 Jun 2020 17:05), Max: 36.8 (19 Jun 2020 17:05)  T(F): 98.2 (19 Jun 2020 17:05), Max: 98.2 (19 Jun 2020 17:05)  HR: 79 (19 Jun 2020 17:05) (79 - 79)  BP: 122/80 (19 Jun 2020 17:05) (122/80 - 122/80)  BP(mean): 89 (19 Jun 2020 17:05) (89 - 89)  ABP: --  ABP(mean): --  RR: 19 (19 Jun 2020 17:05) (19 - 19)  SpO2: 96% (19 Jun 2020 17:05) (96% - 96%)

## 2020-06-19 NOTE — ED ADULT NURSE REASSESSMENT NOTE - NS ED NURSE REASSESS COMMENT FT1
14F NG tube placed without incident.  Hooked up to low wall suction.  Suctioning and draining properly.  Auscultation heard.  Xray ordered.

## 2020-06-19 NOTE — ED PROVIDER NOTE - PROGRESS NOTE DETAILS
Scribe Jaclyn Casale for attending Dr Tang: spoke with surgery on call Dr Yang who saw the pt on previous admission in feb and explained findings. States pt to have NG tube and he will see the pt in the ER. Scribe Jaclyn Casale for attending Dr Tang: spoke with surgery on call Dr Yang who saw the pt on previous admission in feb and explained findings. States pt to have NG tube and he will see the pt in the ER Scribe Jaclyn Casale for attending Dr Tang: spoke with Dr. Beltran who agrees to see the pt. spoke with Dr Yang who agrees to place NG tube and admit pt to surgical step down.

## 2020-06-19 NOTE — ED ADULT NURSE REASSESSMENT NOTE - NS ED NURSE REASSESS COMMENT FT1
Pt cleaned, changed and turned.  Pt with MASD on sacrum. Skin is pink but blanchable.   Will continue to monitor.

## 2020-06-19 NOTE — ED PROVIDER NOTE - NS_ ATTENDINGSCRIBEDETAILS _ED_A_ED_FT
I, Roe Tang MD,  performed the initial face to face bedside interview with this patient regarding history of present illness, review of symptoms and relevant past medical, social and family history.  I completed an independent physical examination.    The history, relevant review of systems, past medical and surgical history, medical decision making, and physical examination was documented by the scribe in my presence and I attest to the accuracy of the documentation.

## 2020-06-19 NOTE — ED ADULT NURSE NOTE - NSIMPLEMENTINTERV_GEN_ALL_ED
Implemented All Fall Risk Interventions:  [place] to call system. Call bell, personal items and telephone within reach. Instruct patient to call for assistance. Room bathroom lighting operational. Non-slip footwear when patient is off stretcher. Physically safe environment: no spills, clutter or unnecessary equipment. Stretcher in lowest position, wheels locked, appropriate side rails in place. Provide visual cue, wrist band, yellow gown, etc. Monitor gait and stability. Monitor for mental status changes and reorient to person, place, and time. Review medications for side effects contributing to fall risk. Reinforce activity limits and safety measures with patient and family.

## 2020-06-19 NOTE — H&P ADULT - HISTORY OF PRESENT ILLNESS
74 y/o with a PMHx of IBS, MS, Parkinson's, HLD, presents with c/o emesis that was blood tinged this am. Has had hematemesis in the past, most recently Feb 2020 and was diagnosed with lactic acidosis, Gastric empysema/pneumatosis, Portal venous gas, Grossly distended distal colon; possible related to underlying adynamic colon of MS disorder. Current CT scan results mimic prior CT from feb 2020. Pt was treated conservatively at that time with NGT decompression, antibiotics. Denies melena, abd pain. Is able to make a BM, pass gas and tolerate PO. Pt is not on any blood thinners.     Pt seen and examined at bedside with chaperone. Pt is AAOx3, pt in no acute distress; pt's speech is clear though limited secondary to his neurologic pathology (MS, parkinsons) pt states stuttering at baseline. Pt denied c/o fever, chills, chest pain, SOB, abd pain, extremity pain or dysfunction, hemoptysis, hematuria, hematochexia, headache, diplopia, vertigo, dizzyness. Pt states (+) void, (+) bowel function, pt states his abdomen has been distended for approx 1 year and has not caused him any pain in that time

## 2020-06-19 NOTE — H&P ADULT - ASSESSMENT
A/P:  Gastric empysema/pneumatosis, recurrent  Portal venous gas, recurrent, idiopathic  Grossly distended distal colon; possible related to underlying adynamic colon of MS disorder  Lactic acidosis  H/o hematemesis  IV antibiotics  NGT decompression  NPO, aggressive IV hydration  GI/DVT prophylaxis  Pain control  Incentive spirometry  Serial abd exams  F/U labs  Monitor hemodyanmics  GI consult  ID consult  Hospitalist consult for medical management, medical comorbidities of h/o: parkinsons, MS, IBS, ogilvies, adynamic colon, HLD, hematemesis, gastric emphysema and portal venous gas  Pt presentation and CT findings closely mimic prior hospitalization/admitting findings and symptoms.  Will attempt conservative non operative management at this time  If pt does require surgical intervention for failure to resolve and/or progression of pathology despite non operative management, pt is at high risk of morbidity, high risk of requiring subtotal colectomy with ostomy, gastric resection  Pt will be monitored for signs of evolution/resolution of pathology and surgical intervention as required and warranted  Pt aware of and agrees with all of the above

## 2020-06-19 NOTE — ED ADULT NURSE NOTE - OBJECTIVE STATEMENT
BIBA from [facility] after episode of dark brown vomit this morning. Pt denies pain anywhere. Hx. Parkinsons, GI hemorrhage, lymphedema, MS, HLD

## 2020-06-19 NOTE — H&P ADULT - NSHPLABSRESULTS_GEN_ALL_CORE
Labs:  Lactate 4.3                        14.7   16.92 )-----------( 186      ( 19 Jun 2020 17:10 )             45.4     CBC Full  -  ( 19 Jun 2020 17:10 )  WBC Count : 16.92 K/uL  RBC Count : 5.22 M/uL  Hemoglobin : 14.7 g/dL  Hematocrit : 45.4 %  Platelet Count - Automated : 186 K/uL  Mean Cell Volume : 87.0 fl  Mean Cell Hemoglobin : 28.2 pg  Mean Cell Hemoglobin Concentration : 32.4 gm/dL  Auto Neutrophil # : 15.06 K/uL  Auto Lymphocyte # : 1.09 K/uL  Auto Monocyte # : 0.67 K/uL  Auto Eosinophil # : 0.00 K/uL  Auto Basophil # : 0.03 K/uL  Auto Neutrophil % : 89.0 %  Auto Lymphocyte % : 6.4 %  Auto Monocyte % : 4.0 %  Auto Eosinophil % : 0.0 %  Auto Basophil % : 0.2 %    06-19    138  |  104  |  19  ----------------------------<  176<H>  3.5   |  23  |  1.21    Ca    9.6      19 Jun 2020 17:10    TPro  8.5<H>  /  Alb  3.4  /  TBili  0.8  /  DBili  x   /  AST  24  /  ALT  18  /  AlkPhos  116  06-19    LIVER FUNCTIONS - ( 19 Jun 2020 17:10 )  Alb: 3.4 g/dL / Pro: 8.5 gm/dL / ALK PHOS: 116 U/L / ALT: 18 U/L / AST: 24 U/L / GGT: x           PT/INR - ( 19 Jun 2020 17:10 )   PT: 11.7 sec;   INR: 1.05 ratio         PTT - ( 19 Jun 2020 17:10 )  PTT:25.7 sec    Radiology:    EXAM:  CT ABDOMEN AND PELVIS IC                          PROCEDURE DATE:  06/19/2020      INTERPRETATION:  CLINICAL INFORMATION: Distended abdomen    COMPARISON: 2/19/2020    IMPRESSION:     Emphysematous gastritis with associated pneumatosis.          Historical CT scan Feb 2020:    EXAM:  CT ANGIO ABD PELVIS (W)AW IC                        PROCEDURE DATE:  02/16/2020    INTERPRETATION:  CLINICAL INFORMATION: GI bleed  COMPARISON: CT abdomen pelvis 7/16/2019  IMPRESSION:   Gastric pneumatosis with portal venous gashighly concerning for emphysematous gastritis   Large amount of stool within the rectosigmoid colon with marked air distention of the descending colon and sigmoid colon.

## 2020-06-19 NOTE — ED ADULT NURSE REASSESSMENT NOTE - NS ED NURSE REASSESS COMMENT FT1
Pt removed NG tube.  New 14F NG tube place without incident.  Patient tolerated well.  Hooked up to intermittent low suction, draining.  XRay ordered.  Will continue to monitor. Pt removed NG tube.  New 16F NG tube place without incident.  Patient tolerated well.  Hooked up to intermittent low suction, draining.  XRay ordered.  Will continue to monitor.

## 2020-06-19 NOTE — ED PROVIDER NOTE - OBJECTIVE STATEMENT
76 y/o male with pmhx of parkinson's disease, MS and IBS presents to the ED c/o general malaise and coffee ground emesis. Pt cannot state why he is vomiting blood/coffee ground blood. He has no complaints of pain. Pt has been seen in the past for similar symptoms. PCP: Dr. Sandy

## 2020-06-20 NOTE — PROGRESS NOTE ADULT - ASSESSMENT
A/P:  Gastric emphysema/pneumatosis, recurrent  Portal venous gas, recurrent  Grossly distended distal colon; possible related to underlying adynamic colon of MS disorder, chronic  Lactic acidosis resolved  Hematemesis, resolved  IV antibiotics  GI consult pending  Hospitalist consult pending  Cont current care and meds  Pt stable from surgical standpoint  Serial abd exams benign to date  F/U labs  Monitor hemodynamics

## 2020-06-20 NOTE — CONSULT NOTE ADULT - SUBJECTIVE AND OBJECTIVE BOX
Patient is a 75y old  Male who presents with a chief complaint of hematemesis (2020 10:50)    HPI:  74 y/o with a PMHx of IBS, MS, Parkinson's, HLD, Ogilvies, prior admission in February for gastritis admitted on  for evaluation of vomiting blood tinged vomitus similar to that on prior exam; the patient denies constipation and had bowel movements; notes a distended abdomen for some time; no fever chills, cough or shortness of breath. Patient has underlying stutter due to his neurologic issues.         PMH: as above  PSH: as above  Meds: per reconciliation sheet, noted below  MEDICATIONS  (STANDING):  citalopram 20 milliGRAM(s) Oral daily  finasteride 5 milliGRAM(s) Oral daily  pantoprazole Infusion 8 mG/Hr (10 mL/Hr) IV Continuous <Continuous>  piperacillin/tazobactam IVPB.. 3.375 Gram(s) IV Intermittent every 8 hours  sodium chloride 0.9%. 1000 milliLiter(s) (125 mL/Hr) IV Continuous <Continuous>    MEDICATIONS  (PRN):  ondansetron Injectable 4 milliGRAM(s) IV Push every 6 hours PRN Nausea    Allergies    Pineapple (Unknown)  sulfa drugs (Unknown)    Intolerances      Social: no smoking, no alcohol, no illegal drugs; no recent travel, no exposure to TB  FAMILY HISTORY:  FHx: hypertension: father       ROS: the patient denies fever, no chills, no HA, no dizziness, no sore throat, no blurry vision, no CP, no palpitations, no SOB, no cough, no abdominal pain, no diarrhea,  no dysuria, no leg pain, no claudication, no rash, no joint aches, no rectal pain or bleeding, no night sweats  All other systems reviewed and are negative    Vital Signs Last 24 Hrs  T(C): 37.1 (2020 08:00), Max: 38 (2020 01:45)  T(F): 98.7 (2020 08:00), Max: 100.4 (2020 01:45)  HR: 81 (2020 12:00) (71 - 92)  BP: 116/55 (2020 12:00) (92/33 - 122/80)  BP(mean): 72 (2020 12:00) (49 - 89)  RR: 11 (2020 12:00) (9 - 19)  SpO2: 99% (2020 12:00) (96% - 99%)  Daily Height in cm: 182.88 (2020 16:59)    Daily Weight in k.9 (2020 01:45)    PE:    Constitutional: frail looking  HEENT: NC/AT, EOMI, PERRLA, conjunctivae clear; ears and nose atraumatic; pharynx clear; ngt in place  Neck: supple; thyroid not palpable  Back: no tenderness  Respiratory: respiratory effort normal; clear to auscultation  Cardiovascular: S1S2 regular, no murmurs  Abdomen: soft, not tender, softly distended, positive BS; no liver or spleen organomegaly  Genitourinary: no suprapubic tenderness  Musculoskeletal: no muscle tenderness, no joint swelling or tenderness  Neurological/ Psychiatric: AxOx3, judgement and insight normal;  moving all extremities  Skin: no rashes; no palpable lesions    Labs: all available labs reviewed                        12.8   12.15 )-----------( 161      ( 2020 06:25 )             39.1     06-20    142  |  113<H>  |  21  ----------------------------<  126<H>  3.9   |  26  |  1.17    Ca    8.3<L>      2020 06:25    TPro  8.5<H>  /  Alb  3.4  /  TBili  0.8  /  DBili  x   /  AST  24  /  ALT  18  /  AlkPhos  116  -19     LIVER FUNCTIONS - ( 2020 17:10 )  Alb: 3.4 g/dL / Pro: 8.5 gm/dL / ALK PHOS: 116 U/L / ALT: 18 U/L / AST: 24 U/L / GGT: x             < from: CT Abdomen and Pelvis w/ IV Cont (20 @ 18:52) >    EXAM:  CT ABDOMEN AND PELVIS IC                            PROCEDURE DATE:  2020          INTERPRETATION:  CLINICAL INFORMATION: Distended abdomen    COMPARISON: 2020    PROCEDURE:   CT of the Abdomen and Pelvis was performed with intravenous contrast.   Intravenous contrast: 90 ml Omnipaque 350. 10 ml discarded.  Oral contrast: None.  Sagittal and coronal reformats were performed.    FINDINGS:  LOWER CHEST: Within normal limits.    LIVER: Pneumatosis. No focal hepatic masses.  BILE DUCTS: Normal caliber.  GALLBLADDER: Within normal limits.  SPLEEN: Within normal limits.  PANCREAS: Within normal limits.  ADRENALS: Within normal limits.  KIDNEYS/URETERS: Kidneys enhance bilaterally and symmetrically without hydronephrosis. Subcentimeter hypodensities bilaterally to small to characterize. No intrarenal calculi. The ureters are unremarkable.    BLADDER: Calculus within the posterior left bladder.  REPRODUCTIVE ORGANS: Enlarged prostate gland. No pelvic adenopathy or pelvic free fluid.    BOWEL: Findings compatible with emphysematous gastritis with associated pneumatosis. Fluid distended distal esophagus. Large amount of stool distending the rectosigmoid colon similar appearance of prior exam. No definite small bowel obstruction.  PERITONEUM: No ascites.  VESSELS: Atherosclerotic changes of the aorta without aneurysmal dilatation.  RETROPERITONEUM/LYMPH NODES: No lymphadenopathy.    ABDOMINAL WALL: Within normal limits.  BONES: Degenerative changes.    IMPRESSION:     Emphysematous gastritis with associated pneumatosis.    < end of copied text >      Radiology: all available radiological tests reviewed    Advanced directives addressed: DNR

## 2020-06-20 NOTE — CONSULT NOTE ADULT - ASSESSMENT
74 y/o with a PMHx of IBS, MS, Parkinson's, HLD, Ogilvies, prior admission in February for gastritis admitted on 6/19 for evaluation of vomiting blood tinged vomitus similar to that on prior exam; the patient denies constipation and had bowel movements; notes a distended abdomen for some time; no fever chills, cough or shortness of breath. Patient has underlying stutter due to his neurologic issues.   1. Patient admitted with emphysematous gastritis secondary to underlying neurologic disorders; also noted with leukocytosis most likely reactive to infection  - patient will be at risk for bowel/GI translocation due to inflammatory process  - follow up cultures   - iv hydration and supportive care   - serial cbc and monitor temperature   - reviewed prior medical records to evaluate for resistant or atypical pathogens   - ngt per surgery  - agree with zosyn as ordered as this will cover broadly for bowel anaerobes, gram negative rods, strep species  - GI evaluation in progress  2. other issues: per medicine

## 2020-06-20 NOTE — CONSULT NOTE ADULT - SUBJECTIVE AND OBJECTIVE BOX
GI consult    HPI:  76 y/o with a PMHx of IBS, MS, Parkinson's, HLD, presents with c/o emesis that was blood tinged this am. Has had hematemesis in the past, most recently Feb 2020 and was diagnosed with lactic acidosis, Gastric empysema/pneumatosis, Portal venous gas, Grossly distended distal colon; possible related to underlying adynamic colon of MS disorder. Current CT scan results mimic prior CT from feb 2020. Pt was treated conservatively at that time with NGT decompression, antibiotics. Denies melena, abd pain. Is able to make a BM, pass gas and tolerate PO. Pt is not on any blood thinners.   Patient known to me from a prior admission for similar issues.  He presents again with vomiting and was found to have emphysematous gastritis on CT scan.  Nasogastric decompression was performed and he is on antibiotics and PPI medications.  He is feeling well currently with no complaints of abdominal pain and no further vomiting.  The NG tube is draining dark material that is not frankly bloody.  He was also found on CAT scan to have a moderate amount of stool in the rectosigmoid area with evidence of distention but there is no clear fecal impaction on my review of images.      PAST MEDICAL & SURGICAL HISTORY:  Irritable bowel syndrome with constipation and diarrhea  Parkinsons disease  MS (multiple sclerosis)  S/P placement of cardiac pacemaker  History of brain surgery: for stimulator. 1997,1998      Home Medications:  atorvastatin 10 mg oral tablet: 1 tab(s) orally once a day (at bedtime) (18 Feb 2020 10:06)  CeleXA 20 mg oral tablet: 1 tab(s) orally once a day (18 Feb 2020 10:06)  cholestyramine 4 g/5 g oral powder for reconstitution: 1 packet(s) orally 2 times a day (9 am and 5pm) for chronic diarrhea (18 Feb 2020 10:06)  furosemide 40 mg oral tablet: 1 tab(s) orally once a day (18 Feb 2020 10:06)  Melatonin 3 mg oral tablet: 1 tab(s) orally once (at bedtime) (18 Feb 2020 10:06)  Milk of Magnesia 8% oral suspension: 30 milliliter(s) orally once a day (at bedtime), As Needed (18 Feb 2020 10:06)  pantoprazole 40 mg oral delayed release tablet: 1 tab(s) orally once a day (18 Feb 2020 10:06)  potassium chloride 20 mEq oral tablet, extended release: 1 tab(s) orally 2 times a day (18 Feb 2020 10:06)  Proscar 5 mg oral tablet: 1 tab(s) orally once a day (18 Feb 2020 10:06)  Vitamin D3 50,000 intl units oral capsule: 1 cap(s) orally once a month (on every 28th) (18 Feb 2020 10:06)      MEDICATIONS  (STANDING):  citalopram 20 milliGRAM(s) Oral daily  finasteride 5 milliGRAM(s) Oral daily  pantoprazole Infusion 8 mG/Hr (10 mL/Hr) IV Continuous <Continuous>  piperacillin/tazobactam IVPB.. 3.375 Gram(s) IV Intermittent every 8 hours  sodium chloride 0.9%. 1000 milliLiter(s) (125 mL/Hr) IV Continuous <Continuous>    MEDICATIONS  (PRN):  ondansetron Injectable 4 milliGRAM(s) IV Push every 6 hours PRN Nausea      Allergies    Pineapple (Unknown)  sulfa drugs (Unknown)    Intolerances        SOCIAL HISTORY:    FAMILY HISTORY:  FHx: hypertension: father      ROS  As above  Otherwise unremarkable, all systems reviewed    PE:  Vital Signs Last 24 Hrs  T(C): 37.1 (20 Jun 2020 08:00), Max: 38 (20 Jun 2020 01:45)  T(F): 98.7 (20 Jun 2020 08:00), Max: 100.4 (20 Jun 2020 01:45)  HR: 79 (20 Jun 2020 09:00) (71 - 92)  BP: 109/64 (20 Jun 2020 09:00) (92/33 - 122/80)  BP(mean): 75 (20 Jun 2020 09:00) (49 - 89)  RR: 12 (20 Jun 2020 09:00) (9 - 19)  SpO2: 99% (20 Jun 2020 09:00) (96% - 99%)    Constitutional: NAD, obese, delayed speech  Anicteric   Respiratory: CTABL, breathing comfortably  Cardiovascular: S1 and S2, RRR  Gastrointestinal: +BS, soft, non tender, non distended, no mass, obese  Extremities: warm, well perfused, +anasarca/edema  Psychiatric: Normal mood, normal affect  Neuro: moves all extremities, grossly intact  Skin: No rashes or lesions    LABS:                        12.8   12.15 )-----------( 161      ( 20 Jun 2020 06:25 )             39.1     06-20    142  |  113<H>  |  21  ----------------------------<  126<H>  3.9   |  26  |  1.17    Ca    8.3<L>      20 Jun 2020 06:25    TPro  8.5<H>  /  Alb  3.4  /  TBili  0.8  /  DBili  x   /  AST  24  /  ALT  18  /  AlkPhos  116  06-19    PT/INR - ( 19 Jun 2020 17:10 )   PT: 11.7 sec;   INR: 1.05 ratio         PTT - ( 19 Jun 2020 17:10 )  PTT:25.7 sec  LIVER FUNCTIONS - ( 19 Jun 2020 17:10 )  Alb: 3.4 g/dL / Pro: 8.5 gm/dL / ALK PHOS: 116 U/L / ALT: 18 U/L / AST: 24 U/L / GGT: x             RADIOLOGY & ADDITIONAL STUDIES:  CT reviewed-see HPI

## 2020-06-20 NOTE — CONSULT NOTE ADULT - ASSESSMENT
emphysematous gastritis likely related to dysmotility due to parkinsons/MS  improving with abx/NGT/PPI  constipation/colonic dysmotility    recommend:  -cont abx  -cont PPI BID  -cont NGT decompression  -favor performing EGD when more stable to r/o malignancy/PUD/GOO etc although likely related to his neurologic diseases  -dulcolax suppository  -further care per surgery  -d/w RN  -His care was discussed with his son at the time of our encounter using the patient's phone as well.

## 2020-06-20 NOTE — PROGRESS NOTE ADULT - SUBJECTIVE AND OBJECTIVE BOX
CC:Patient is a 75y old  Male who presents with a chief complaint of hematemesis (19 Jun 2020 21:22)      Subjective:  Pt seen and examined at bedside with chaperone. Pt is AAOx3, pt in no acute distress. Pt denied c/o fever, chills, chest pain, SOB, abd pain, N/V/D, extremity pain or dysfunction, hemoptysis, hematemesis, hematuria, hematochexia, headache, diplopia, vertigo, dizzyness. Pt states (+) void, (+) bowel function    ROS:  otherwise as abovementioned ROS    Vital Signs Last 24 Hrs  T(C): 36.9 (20 Jun 2020 05:00), Max: 38 (20 Jun 2020 01:45)  T(F): 98.5 (20 Jun 2020 05:00), Max: 100.4 (20 Jun 2020 01:45)  HR: 74 (20 Jun 2020 07:00) (71 - 92)  BP: 104/70 (20 Jun 2020 07:00) (92/33 - 122/80)  BP(mean): 75 (20 Jun 2020 07:00) (49 - 89)  RR: 14 (20 Jun 2020 07:00) (9 - 19)  SpO2: 98% (20 Jun 2020 07:00) (96% - 98%)    Labs:                                12.8   12.15 )-----------( 161      ( 20 Jun 2020 06:25 )             39.1     CBC Full  -  ( 20 Jun 2020 06:25 )  WBC Count : 12.15 K/uL  RBC Count : 4.48 M/uL  Hemoglobin : 12.8 g/dL  Hematocrit : 39.1 %  Platelet Count - Automated : 161 K/uL  Mean Cell Volume : 87.3 fl  Mean Cell Hemoglobin : 28.6 pg  Mean Cell Hemoglobin Concentration : 32.7 gm/dL  Auto Neutrophil # : 9.87 K/uL  Auto Lymphocyte # : 1.54 K/uL  Auto Monocyte # : 0.70 K/uL  Auto Eosinophil # : 0.00 K/uL  Auto Basophil # : 0.01 K/uL  Auto Neutrophil % : 81.2 %  Auto Lymphocyte % : 12.7 %  Auto Monocyte % : 5.8 %  Auto Eosinophil % : 0.0 %  Auto Basophil % : 0.1 %    06-20    142  |  113<H>  |  21  ----------------------------<  126<H>  3.9   |  26  |  1.17    Ca    8.3<L>      20 Jun 2020 06:25    TPro  8.5<H>  /  Alb  3.4  /  TBili  0.8  /  DBili  x   /  AST  24  /  ALT  18  /  AlkPhos  116  06-19    LIVER FUNCTIONS - ( 19 Jun 2020 17:10 )  Alb: 3.4 g/dL / Pro: 8.5 gm/dL / ALK PHOS: 116 U/L / ALT: 18 U/L / AST: 24 U/L / GGT: x           PT/INR - ( 19 Jun 2020 17:10 )   PT: 11.7 sec;   INR: 1.05 ratio         PTT - ( 19 Jun 2020 17:10 )  PTT:25.7 sec      Meds:  citalopram 20 milliGRAM(s) Oral daily  finasteride 5 milliGRAM(s) Oral daily  ondansetron Injectable 4 milliGRAM(s) IV Push every 6 hours PRN  pantoprazole Infusion 8 mG/Hr IV Continuous <Continuous>  piperacillin/tazobactam IVPB.. 3.375 Gram(s) IV Intermittent every 8 hours  sodium chloride 0.9%. 1000 milliLiter(s) IV Continuous <Continuous>      Radiology:      Physical exam:  Pt is aaox3  Pt in no acute distress  Psych: normal affect  Resp: CTAB  CVS: S1S2(+)  ABD: bowel sounds (+), soft, no change to abd distention since admission, no rebound, no guarding, no rigidity, no skin changes to exam. No tenderness to exam. NGT demonstrates appropriate bilious output  EXT: no calf tenderness to exam b/l, on VTE prophylaxis  Skin: no adverse skin changes to exam

## 2020-06-21 NOTE — PROGRESS NOTE ADULT - SUBJECTIVE AND OBJECTIVE BOX
Patient is a 75y old  Male who presents with a chief complaint of hematemesis (21 Jun 2020 10:35)    Covering for Dr. [place]    Subective:  NG in place  No complaints  NG output non-bloody    PAST MEDICAL & SURGICAL HISTORY:  Irritable bowel syndrome with constipation and diarrhea  Parkinsons disease  MS (multiple sclerosis)  S/P placement of cardiac pacemaker  History of brain surgery: for stimulator. 1997,1998      MEDICATIONS  (STANDING):  citalopram 20 milliGRAM(s) Oral daily  dextrose 5% + sodium chloride 0.45% with potassium chloride 20 mEq/L 1000 milliLiter(s) (125 mL/Hr) IV Continuous <Continuous>  finasteride 5 milliGRAM(s) Oral daily  pantoprazole Infusion 8 mG/Hr (10 mL/Hr) IV Continuous <Continuous>  piperacillin/tazobactam IVPB.. 3.375 Gram(s) IV Intermittent every 8 hours    MEDICATIONS  (PRN):  ondansetron Injectable 4 milliGRAM(s) IV Push every 6 hours PRN Nausea      REVIEW OF SYSTEMS:    RESPIRATORY: No shortness of breath  CARDIOVASCULAR: No chest pain  All other review of systems is negative unless indicated above.    Vital Signs Last 24 Hrs  T(C): 36.4 (21 Jun 2020 08:00), Max: 37.2 (20 Jun 2020 20:00)  T(F): 97.6 (21 Jun 2020 08:00), Max: 98.9 (20 Jun 2020 20:00)  HR: 47 (21 Jun 2020 11:00) (47 - 81)  BP: 116/48 (21 Jun 2020 11:00) (104/49 - 127/74)  BP(mean): 62 (21 Jun 2020 11:00) (61 - 100)  RR: 13 (21 Jun 2020 11:00) (8 - 19)  SpO2: 100% (21 Jun 2020 08:00) (96% - 100%)    PHYSICAL EXAM:    Constitutional: NAD, chronically ill appearing  Respiratory: CTAB  Cardiovascular: S1 and S2  Gastrointestinal: BS+, soft, NT/ND  Psychiatric: Normal mood, normal affect    LABS:                        11.7   9.84  )-----------( 124      ( 21 Jun 2020 06:40 )             36.0     06-21    143  |  114<H>  |  14  ----------------------------<  86  3.3<L>   |  26  |  1.01    Ca    8.4<L>      21 Jun 2020 06:40    TPro  8.5<H>  /  Alb  3.4  /  TBili  0.8  /  DBili  x   /  AST  24  /  ALT  18  /  AlkPhos  116  06-19    PT/INR - ( 19 Jun 2020 17:10 )   PT: 11.7 sec;   INR: 1.05 ratio         PTT - ( 19 Jun 2020 17:10 )  PTT:25.7 sec  LIVER FUNCTIONS - ( 19 Jun 2020 17:10 )  Alb: 3.4 g/dL / Pro: 8.5 gm/dL / ALK PHOS: 116 U/L / ALT: 18 U/L / AST: 24 U/L / GGT: x             RADIOLOGY & ADDITIONAL STUDIES:

## 2020-06-21 NOTE — PROGRESS NOTE ADULT - SUBJECTIVE AND OBJECTIVE BOX
CC:Patient is a 75y old  Male who presents with a chief complaint of hematemesis (21 Jun 2020 09:54)      Subjective:  Pt seen and examined at bedside with chaperone. Pt is AAOx3, pt in no acute distress. Pt denied c/o fever, chills, chest pain, SOB, abd pain, N/V/D, extremity pain or dysfunction, hemoptysis, hematemesis, hematuria, hematochexia, headache, diplopia, vertigo, dizzyness. Pt states (+) void, (+) bowel function  Pt has reaffirmed his DNR/DNI status from prior admission, verbally witnessed by nursing staff, MOLST form updated    ROS:  otherwise as abovementioned ROS    Vital Signs Last 24 Hrs  T(C): 36.4 (21 Jun 2020 08:00), Max: 37.2 (20 Jun 2020 20:00)  T(F): 97.6 (21 Jun 2020 08:00), Max: 98.9 (20 Jun 2020 20:00)  HR: 54 (21 Jun 2020 10:00) (54 - 81)  BP: 114/52 (21 Jun 2020 10:00) (99/46 - 127/74)  BP(mean): 66 (21 Jun 2020 10:00) (60 - 100)  RR: 8 (21 Jun 2020 10:00) (8 - 19)  SpO2: 100% (21 Jun 2020 08:00) (96% - 100%)    Labs:                                11.7   9.84  )-----------( 124      ( 21 Jun 2020 06:40 )             36.0     CBC Full  -  ( 21 Jun 2020 06:40 )  WBC Count : 9.84 K/uL  RBC Count : 4.07 M/uL  Hemoglobin : 11.7 g/dL  Hematocrit : 36.0 %  Platelet Count - Automated : 124 K/uL  Mean Cell Volume : 88.5 fl  Mean Cell Hemoglobin : 28.7 pg  Mean Cell Hemoglobin Concentration : 32.5 gm/dL  Auto Neutrophil # : x  Auto Lymphocyte # : x  Auto Monocyte # : x  Auto Eosinophil # : x  Auto Basophil # : x  Auto Neutrophil % : x  Auto Lymphocyte % : x  Auto Monocyte % : x  Auto Eosinophil % : x  Auto Basophil % : x    06-21    143  |  114<H>  |  14  ----------------------------<  86  3.3<L>   |  26  |  1.01    Ca    8.4<L>      21 Jun 2020 06:40    TPro  8.5<H>  /  Alb  3.4  /  TBili  0.8  /  DBili  x   /  AST  24  /  ALT  18  /  AlkPhos  116  06-19    LIVER FUNCTIONS - ( 19 Jun 2020 17:10 )  Alb: 3.4 g/dL / Pro: 8.5 gm/dL / ALK PHOS: 116 U/L / ALT: 18 U/L / AST: 24 U/L / GGT: x           PT/INR - ( 19 Jun 2020 17:10 )   PT: 11.7 sec;   INR: 1.05 ratio         PTT - ( 19 Jun 2020 17:10 )  PTT:25.7 sec      Meds:  citalopram 20 milliGRAM(s) Oral daily  dextrose 5% + sodium chloride 0.45% with potassium chloride 20 mEq/L 1000 milliLiter(s) IV Continuous <Continuous>  finasteride 5 milliGRAM(s) Oral daily  ondansetron Injectable 4 milliGRAM(s) IV Push every 6 hours PRN  pantoprazole Infusion 8 mG/Hr IV Continuous <Continuous>  piperacillin/tazobactam IVPB.. 3.375 Gram(s) IV Intermittent every 8 hours      Radiology:      Physical exam:  Pt is aaox3  Pt in no acute distress  Psych: normal affect  Resp: CTAB  CVS: S1S2(+)  ABD: bowel sounds (+), soft, no change to abd distention since admission, no rebound, no guarding, no rigidity, no skin changes to exam. No tenderness to exam. NGT demonstrates appropriate bilious output  EXT: no calf tenderness to exam b/l, on VTE prophylaxis  Skin: no adverse skin changes to exam

## 2020-06-21 NOTE — PROGRESS NOTE ADULT - ASSESSMENT
76 y/o with a PMHx of IBS, MS, Parkinson's, HLD, [place], prior admission in February for gastritis admitted on 6/19 for evaluation of vomiting blood tinged vomitus similar to that on prior exam; the patient denies constipation and had bowel movements; notes a distended abdomen for some time; no fever chills, cough or shortness of breath. Patient has underlying stutter due to his neurologic issues.   1. 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 other issues: per medicine

## 2020-06-21 NOTE — PROGRESS NOTE ADULT - SUBJECTIVE AND OBJECTIVE BOX
CC:Patient is a 75y old  Male who presents with a chief complaint of hematemesis       Subjective:  Pt seen and examined at bedside with chaperone. Pt is AAOx3, pt in no acute distress. Pt denied c/o fever, chills, chest pain, SOB, abd pain, N/V/D, extremity pain or dysfunction, hemoptysis, hematemesis, hematuria, hematochezia, headache, diplopia, vertigo, dizziness, Pt states (+) void, have a large non blood BM overnight.     ROS:  otherwise as abovementioned ROS    Vital Signs Last 24 Hrs  T(C): 36.4 (21 Jun 2020 08:00), Max: 37.2 (20 Jun 2020 20:00)  T(F): 97.6 (21 Jun 2020 08:00), Max: 98.9 (20 Jun 2020 20:00)  HR: 54 (21 Jun 2020 08:00) (54 - 81)  BP: 114/49 (21 Jun 2020 08:00) (99/46 - 127/74)  BP(mean): 66 (21 Jun 2020 08:00) (60 - 100)  RR: 10 (21 Jun 2020 08:00) (9 - 19)  SpO2: 100% (21 Jun 2020 08:00) (96% - 100%)    Labs:              11.7<L>                143  | 26   | 14           9.84  >-----------< 124<L>   ------------------------< 86                    36.0<L>                3.3<L>| 114<H>| 1.01                                                                      Ca 8.4<L> Mg x     Ph x        ,             12.8<L>                142  | 26   | 21           12.15<H> >-----------< 161     ------------------------< 126<H>                 39.1                 3.9  | 113<H>| 1.17                                                                      Ca 8.3<L> Mg x     Ph x            Meds:  citalopram 20 milliGRAM(s) Oral daily  finasteride 5 milliGRAM(s) Oral daily  ondansetron Injectable 4 milliGRAM(s) IV Push every 6 hours PRN  pantoprazole Infusion 8 mG/Hr IV Continuous <Continuous>  piperacillin/tazobactam IVPB.. 3.375 Gram(s) IV Intermittent every 8 hours  sodium chloride 0.9%. 1000 milliLiter(s) IV Continuous <Continuous>      Radiology:      Physical exam:  Pt is aaox3  Pt in no acute distress  Psych: normal affect  Resp: CTAB  CVS: S1S2(+)  ABD: bowel sounds (+), soft, no change to abd distention since admission, no rebound, no guarding, no rigidity, no skin changes to exam. No tenderness to exam. NGT demonstrates appropriate bilious output. Output 250cc/24 hrs  EXT: no calf tenderness to exam b/l, on VTE prophylaxis  Skin: no adverse skin changes to exam

## 2020-06-21 NOTE — PROGRESS NOTE ADULT - ASSESSMENT
A/P:  Gastric emphysema/pneumatosis, recurrent  Portal venous gas, recurrent  Grossly distended distal colon; possible related to underlying adynamic colon of MS disorder, chronic  Lactic acidosis resolved  Hematemesis, resolved  Leukocytosis resolved  IV antibiotics  GI on consult, plan per GI for endoscopy  ID on consult  Hospitalist consult pending  NPO, IV hydration  Cont current care and meds  Pt stable from surgical standpoint  Serial abd exams benign to date  F/U labs  Monitor hemodynamics

## 2020-06-21 NOTE — PROGRESS NOTE ADULT - SUBJECTIVE AND OBJECTIVE BOX
Date of service: 06-21-20 @ 12:25    Patient lying in bed; still with ngt; has difficulty with urinating; has been having to be straight cath        ROS unable to obtain secondary to patient medical condition     MEDICATIONS  (STANDING):  citalopram 20 milliGRAM(s) Oral daily  dextrose 5% + sodium chloride 0.45% with potassium chloride 20 mEq/L 1000 milliLiter(s) (125 mL/Hr) IV Continuous <Continuous>  finasteride 5 milliGRAM(s) Oral daily  pantoprazole Infusion 8 mG/Hr (10 mL/Hr) IV Continuous <Continuous>  piperacillin/tazobactam IVPB.. 3.375 Gram(s) IV Intermittent every 8 hours    MEDICATIONS  (PRN):  ondansetron Injectable 4 milliGRAM(s) IV Push every 6 hours PRN Nausea      Vital Signs Last 24 Hrs  T(C): 36.6 (21 Jun 2020 12:00), Max: 37.2 (20 Jun 2020 20:00)  T(F): 97.8 (21 Jun 2020 12:00), Max: 98.9 (20 Jun 2020 20:00)  HR: 51 (21 Jun 2020 12:00) (47 - 80)  BP: 104/57 (21 Jun 2020 12:00) (104/49 - 127/74)  BP(mean): 68 (21 Jun 2020 12:00) (61 - 100)  RR: 11 (21 Jun 2020 12:00) (8 - 19)  SpO2: 100% (21 Jun 2020 12:00) (96% - 100%)        Physical Exam:        Constitutional: frail looking  HEENT: NC/AT, EOMI, PERRLA, conjunctivae clear; ears and nose atraumatic; pharynx clear; ngt in place  Neck: supple; thyroid not palpable  Back: no tenderness  Respiratory: respiratory effort normal; clear to auscultation  Cardiovascular: S1S2 regular, no murmurs  Abdomen: soft, not tender, softly distended, positive BS; no liver or spleen organomegaly  Genitourinary: no suprapubic tenderness  Musculoskeletal: no muscle tenderness, no joint swelling or tenderness  Neurological/ Psychiatric: AxOx3, judgement and insight normal;  moving all extremities  Skin: no rashes; no palpable lesions    Labs: all available labs reviewed                         Labs:                        11.7   9.84  )-----------( 124      ( 21 Jun 2020 06:40 )             36.0     06-21    143  |  114<H>  |  14  ----------------------------<  86  3.3<L>   |  26  |  1.01    Ca    8.4<L>      21 Jun 2020 06:40    TPro  8.5<H>  /  Alb  3.4  /  TBili  0.8  /  DBili  x   /  AST  24  /  ALT  18  /  AlkPhos  116  06-19           Cultures:       Culture - Blood (collected 06-19-20 @ 22:08)  Source: .Blood None  Preliminary Report (06-21-20 @ 06:01):    No growth to date.            < from: CT Abdomen and Pelvis w/ IV Cont (06.19.20 @ 18:52) >    EXAM:  CT ABDOMEN AND PELVIS IC                            PROCEDURE DATE:  06/19/2020          INTERPRETATION:  CLINICAL INFORMATION: Distended abdomen    COMPARISON: 2/19/2020    PROCEDURE:   CT of the Abdomen and Pelvis was performed with intravenous contrast.   Intravenous contrast: 90 ml Omnipaque 350. 10 ml discarded.  Oral contrast: None.  Sagittal and coronal reformats were performed.    FINDINGS:  LOWER CHEST: Within normal limits.    LIVER: Pneumatosis. No focal hepatic masses.  BILE DUCTS: Normal caliber.  GALLBLADDER: Within normal limits.  SPLEEN: Within normal limits.  PANCREAS: Within normal limits.  ADRENALS: Within normal limits.  KIDNEYS/URETERS: Kidneys enhance bilaterally and symmetrically without hydronephrosis. Subcentimeter hypodensities bilaterally to small to characterize. No intrarenal calculi. The ureters are unremarkable.    BLADDER: Calculus within the posterior left bladder.  REPRODUCTIVE ORGANS: Enlarged prostate gland. No pelvic adenopathy or pelvic free fluid.    BOWEL: Findings compatible with emphysematous gastritis with associated pneumatosis. Fluid distended distal esophagus. Large amount of stool distending the rectosigmoid colon similar appearance of prior exam. No definite small bowel obstruction.  PERITONEUM: No ascites.  VESSELS: Atherosclerotic changes of the aorta without aneurysmal dilatation.  RETROPERITONEUM/LYMPH NODES: No lymphadenopathy.    ABDOMINAL WALL: Within normal limits.  BONES: Degenerative changes.    IMPRESSION:     Emphysematous gastritis with associated pneumatosis.    < end of copied text >      Radiology: all available radiological tests reviewed    Advanced directives addressed: DNR

## 2020-06-21 NOTE — PROGRESS NOTE ADULT - ASSESSMENT
A/P:  Gastric emphysema/pneumatosis, recurrent  Portal venous gas, recurrent  Grossly distended distal colon; possible related to underlying adynamic colon of MS disorder, chronic  Lactic acidosis resolved  Hematemesis, resolved  IV antibiotics  GI consult appreciated: EGD when stable  ID agrees with [facility]  Hospitalist consult pending  Cont current care and meds  Pt stable from surgical standpoint  Serial abd exams benign to date  F/U labs  Monitor hemodynamics    Case discussed with Dr Valadez

## 2020-06-22 NOTE — PROGRESS NOTE ADULT - SUBJECTIVE AND OBJECTIVE BOX
Patient is a 75y old  Male who presents with a chief complaint of hematemesis (22 Jun 2020 13:58)      HPI:  74 y/o with a PMHx of IBS, MS, Parkinson's, HLD, presents with c/o emesis that was blood tinged this am. Has had hematemesis in the past, most recently Feb 2020 and was diagnosed with lactic acidosis, Gastric empysema/pneumatosis, Portal venous gas, Grossly distended distal colon; possible related to underlying adynamic colon of MS disorder. Current CT scan results mimic prior CT from feb 2020. Pt was treated conservatively at that time with NGT decompression, antibiotics. Denies melena, abd pain. Is able to make a BM, pass gas and tolerate PO. Pt is not on any blood thinners.   Pt seen and examined at bedside with chaperone. Pt is AAOx3, pt in no acute distress; pt's speech is clear though limited secondary to his neurologic pathology (MS, parkinsons) pt states stuttering at baseline. Pt denied c/o fever, chills, chest pain, SOB, abd pain, extremity pain or dysfunction, hemoptysis, hematuria, hematochexia, headache, diplopia, vertigo, dizzyness. Pt states (+) void, (+) bowel function, pt states his abdomen has been distended for approx 1 year and has not caused him any pain in that time (19 Jun 2020 21:22)  6/22: Pt seen and examined, no abdominal pain, no fever, passing gas , HD stable.  ROS:.  [X] A ten-point review of systems was otherwise negative except as noted.  Systemic:	[ ] Fever	[ ] Chills	[ ] Night sweats    [ ] Fatigue	[ ] Other  [] Cardiovascular:  [] Pulmonary:  [] Renal/Urologic:  [] Gastrointestinal: abdominal pain, vomiting  [] Metabolic:  [] Neurologic:  [] Hematologic:  [] ENT:  [] Ophthalmologic:  [] Musculoskeletal:    [ ] Due to altered mental status/intubation, subjective information were not able to be obtained from the patient. History was obtained, to the extent possible, from review of the chart and collateral sources of information.    All other system review is negative .  PAST MEDICAL & SURGICAL HISTORY:  Irritable bowel syndrome with constipation and diarrhea  Parkinsons disease  MS (multiple sclerosis)  S/P placement of cardiac pacemaker  History of brain surgery: for stimulator. 1997,1998    FAMILY HISTORY:  FHx: hypertension: father    Social History:     Alcohol: Denied  Smoking: Denied  Drug Use: Denied        Vital Signs Last 24 Hrs  T(C): 36.5 (22 Jun 2020 12:00), Max: 36.8 (21 Jun 2020 20:00)  T(F): 97.7 (22 Jun 2020 12:00), Max: 98.3 (21 Jun 2020 20:00)  HR: 60 (22 Jun 2020 14:00) (39 - 60)  BP: 95/49 (22 Jun 2020 14:00) (77/48 - 109/55)  BP(mean): 58 (22 Jun 2020 14:00) (42 - 71)  RR: 10 (22 Jun 2020 14:00) (4 - 17)  SpO2: 98% (22 Jun 2020 13:00) (93% - 100%)    I&O's Summary    21 Jun 2020 07:01  -  22 Jun 2020 07:00  --------------------------------------------------------  IN: 3334 mL / OUT: 1250 mL / NET: 2084 mL    22 Jun 2020 07:01  -  22 Jun 2020 14:37  --------------------------------------------------------  IN: 0 mL / OUT: 250 mL / NET: -250 mL        PHYSICAL EXAM:  Constitutional: NAD, GCS: 15/15, baseline speech difficulty   AOX3  Eyes:  WNL  ENMT:  WNL  Neck:  WNL, non tender  Back: Non tender  Respiratory: CTABL  Cardiovascular:  S1+S2+0  Gastrointestinal: Soft, mid distension , NT  Genitourinary:  WNL  Extremities: NV intact  Vascular:  Intact  Neurological:  exam at baseline   Skin: WNL  Musculoskeletal: WNL  Psychiatric: Grossly WNL        Labs:                          10.9   6.35  )-----------( 118      ( 22 Jun 2020 06:27 )             34.0       06-22    142  |  110<H>  |  8   ----------------------------<  109<H>  3.4<L>   |  27  |  0.95    Ca    8.0<L>      22 Jun 2020 06:27      Lactate, Blood: 1.7 mmol/L (06.20.20 @ 06:25)    < from: Xray Chest 1 View- PORTABLE-Urgent (06.20.20 @ 00:23) >      IMPRESSION: NG tube tip in body of stomach...    < from: CT Abdomen and Pelvis w/ IV Cont (06.19.20 @ 18:52) >    IMPRESSION:     Emphysematous gastritis with associated pneumatosis.        < end of copied text >

## 2020-06-22 NOTE — PROGRESS NOTE ADULT - ASSESSMENT
emphysematous gastritis likely related to dysmotility due to parkinsons/MS  improving with conservative care  constipation/colonic dysmotility although having BMs    recommend:  -cont supportive care, PPI BID  -EGD tomorrow if cleared by cardiology, r/o malignancy/PUD/GOO etc although likely related to his neurologic diseases  -further care per surgery  -d/w RN at bedside  NPO p MN

## 2020-06-22 NOTE — CONSULT NOTE ADULT - SUBJECTIVE AND OBJECTIVE BOX
HPI:  74 y/o with a PMHx of IBS, MS, Parkinson's, HLD, presents with c/o emesis that was blood tinged this am. Has had hematemesis in the past, most recently 2020 and was diagnosed with lactic acidosis, Gastric empysema/pneumatosis, Portal venous gas, Grossly distended distal colon; possible related to underlying adynamic colon of MS disorder. Current CT scan results mimic prior CT from 2020. Pt was treated conservatively at that time with NGT decompression, antibiotics. Denies melena, abd pain. Is able to make a BM, pass gas and tolerate PO. Pt is not on any blood thinners.   Pt seen and examined at bedside with chaperone. Pt is AAOx3, pt in no acute distress; pt's speech is clear though limited secondary to his neurologic pathology (MS, parkinsons) pt states stuttering at baseline. Pt denied c/o fever, chills, chest pain, SOB, abd pain, extremity pain or dysfunction, hemoptysis, hematuria, hematochexia, headache, diplopia, vertigo, dizzyness. Pt states (+) void, (+) bowel function, pt states his abdomen has been distended for approx 1 year and has not caused him any pain in that time. 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   Patient noted to have bradycardia on telemetry.  Transferred from ICU to 3 E today.   Patient denies any history of dizziness, lightheadedness, near syncope or syncope.  Patient has not been on any av jacob blockers in the past, denies any history of arrythmia.  EP asked to consult for bradycardia        PAST MEDICAL & SURGICAL HISTORY:  Irritable bowel syndrome with constipation and diarrhea  Parkinsons disease  MS (multiple sclerosis)  S/P placement of cardiac pacemaker  History of brain surgery: for stimulator. ,      MEDICATIONS  (STANDING):  citalopram 20 milliGRAM(s) Oral daily  dextrose 5% + sodium chloride 0.45% with potassium chloride 20 mEq/L 1000 milliLiter(s) (125 mL/Hr) IV Continuous <Continuous>  finasteride 5 milliGRAM(s) Oral daily  magnesium sulfate  IVPB 2 Gram(s) IV Intermittent once  pantoprazole Infusion 8 mG/Hr (10 mL/Hr) IV Continuous <Continuous>  piperacillin/tazobactam IVPB.. 3.375 Gram(s) IV Intermittent every 8 hours  potassium chloride  10 mEq/100 mL IVPB 10 milliEquivalent(s) IV Intermittent once    MEDICATIONS  (PRN):  ondansetron Injectable 4 milliGRAM(s) IV Push every 6 hours PRN Nausea      Allergies    Pineapple (Unknown)  sulfa drugs (Unknown)    Intolerances        SOCIAL HISTORY: Denies tobacco, etoh abuse or illicit drug use    FAMILY HISTORY:  FHx: hypertension: father      Vital Signs Last 24 Hrs  T(C): 36.8 (2020 15:59), Max: 36.8 (2020 20:00)  T(F): 98.3 (2020 15:59), Max: 98.3 (2020 20:00)  HR: 64 (2020 15:59) (39 - 64)  BP: 108/41 (2020 15:59) (77/48 - 109/55)  BP(mean): 58 (2020 14:00) (42 - 71)  RR: 16 (2020 15:59) (4 - 17)  SpO2: 99% (2020 15:59) (93% - 100%)    REVIEW OF SYSTEMS:    CONSTITUTIONAL:  As per HPI.  HEENT:  Eyes:  No diplopia or blurred vision. ENT:  No earache, sore throat or runny nose.  CARDIOVASCULAR:  No pressure, squeezing, strangling, tightness, heaviness or aching about the chest, neck, axilla or epigastrium.  RESPIRATORY:  No cough, shortness of breath, PND or orthopnea.  GASTROINTESTINAL:  No nausea, vomiting or diarrhea.  GENITOURINARY:  No dysuria, frequency or urgency.  MUSCULOSKELETAL:  As per HPI.  SKIN:  No change in skin, hair or nails.  NEUROLOGIC:  No paresthesias, fasciculations, seizures or weakness.  PSYCHIATRIC:  No disorder of thought or mood.  ENDOCRINE:  No heat or cold intolerance, polyuria or polydipsia.  HEMATOLOGICAL:  No easy bruising or bleedings:  .     PHYSICAL EXAMINATION:    GENERAL APPEARANCE:  Pt. is not currently dyspneic, in no distress. Pt. is alert, oriented, and pleasant.  HEENT:  Pupils are normal and react normally. No icterus. Mucous membranes well colored.  NECK:  Supple. No lymphadenopathy. Jugular venous pressure not elevated. Carotids equal.   HEART:   The cardiac impulse has a normal quality. There are no murmurs, rubs or gallops noted  CHEST:  Chest is clear to auscultation. Normal respiratory effort.  ABDOMEN:  Soft and nontender, +BS.  EXTREMITIES:  LLE edema, chronic.  SKIN:  No rash or significant lesions are noted.    I&O's Summary    2020 07:  -  2020 07:00  --------------------------------------------------------  IN: 3334 mL / OUT: 1250 mL / NET: 2084 mL    2020 07:01  -  2020 17:31  --------------------------------------------------------  IN: 0 mL / OUT: 250 mL / NET: -250 mL        LABS:                        10.9   6.35  )-----------( 118      ( 2020 06:27 )             34.0     06-22    142  |  110<H>  |  8   ----------------------------<  109<H>  3.4<L>   |  27  |  0.95    Ca    8.0<L>      2020 06:27          EK2020: NSR@89BPM  QRS:202ms  QT/QTc:486/591ms    TELEMETRY: SB      RADIOLOGY & ADDITIONAL STUDIES: HPI:  74 y/o with a PMHx of IBS, MS, Parkinson's, HLD, presents with c/o emesis that was blood tinged this am. Has had hematemesis in the past, most recently 2020 and was diagnosed with lactic acidosis, Gastric empysema/pneumatosis, Portal venous gas, Grossly distended distal colon; possible related to underlying adynamic colon of MS disorder. Current CT scan results mimic prior CT from 2020. Pt was treated conservatively at that time with NGT decompression, antibiotics. Denies melena, abd pain. Is able to make a BM, pass gas and tolerate PO. Pt is not on any blood thinners.   Pt seen and examined at bedside with chaperone. Pt is AAOx3, pt in no acute distress; pt's speech is clear though limited secondary to his neurologic pathology (MS, parkinsons) pt states stuttering at baseline. Pt denied c/o fever, chills, chest pain, SOB, abd pain, extremity pain or dysfunction, hemoptysis, hematuria, hematochexia, headache, diplopia, vertigo, dizzyness. Pt states (+) void, (+) bowel function, pt states his abdomen has been distended for approx 1 year and has not caused him any pain in that time.     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   Patient noted to have bradycardia on telemetry.  Transferred from ICU to 3 E today.   Patient denies any history of dizziness, lightheadedness, near syncope or syncope.  Patient has not been on any av jacob blockers in the past, denies any history of arrythmia.  EP asked to consult for bradycardia        PAST MEDICAL & SURGICAL HISTORY:  Irritable bowel syndrome with constipation and diarrhea  Parkinsons disease  MS (multiple sclerosis)  S/P placement of cardiac pacemaker  History of brain surgery: for stimulator. ,      MEDICATIONS  (STANDING):  citalopram 20 milliGRAM(s) Oral daily  dextrose 5% + sodium chloride 0.45% with potassium chloride 20 mEq/L 1000 milliLiter(s) (125 mL/Hr) IV Continuous <Continuous>  finasteride 5 milliGRAM(s) Oral daily  magnesium sulfate  IVPB 2 Gram(s) IV Intermittent once  pantoprazole Infusion 8 mG/Hr (10 mL/Hr) IV Continuous <Continuous>  piperacillin/tazobactam IVPB.. 3.375 Gram(s) IV Intermittent every 8 hours  potassium chloride  10 mEq/100 mL IVPB 10 milliEquivalent(s) IV Intermittent once    MEDICATIONS  (PRN):  ondansetron Injectable 4 milliGRAM(s) IV Push every 6 hours PRN Nausea      Allergies    Pineapple (Unknown)  sulfa drugs (Unknown)    Intolerances        SOCIAL HISTORY: Denies tobacco, etoh abuse or illicit drug use    FAMILY HISTORY:  FHx: hypertension: father      Vital Signs Last 24 Hrs  T(C): 36.8 (2020 15:59), Max: 36.8 (2020 20:00)  T(F): 98.3 (2020 15:59), Max: 98.3 (2020 20:00)  HR: 64 (2020 15:59) (39 - 64)  BP: 108/41 (2020 15:59) (77/48 - 109/55)  BP(mean): 58 (2020 14:00) (42 - 71)  RR: 16 (2020 15:59) (4 - 17)  SpO2: 99% (2020 15:59) (93% - 100%)    REVIEW OF SYSTEMS:    CONSTITUTIONAL:  As per HPI.  HEENT:  Eyes:  No diplopia or blurred vision. ENT:  No earache, sore throat or runny nose.  CARDIOVASCULAR:  No pressure, squeezing, strangling, tightness, heaviness or aching about the chest, neck, axilla or epigastrium.  RESPIRATORY:  No cough, shortness of breath, PND or orthopnea.  GASTROINTESTINAL:  No nausea, vomiting or diarrhea.  GENITOURINARY:  No dysuria, frequency or urgency.  MUSCULOSKELETAL:  As per HPI.  SKIN:  No change in skin, hair or nails.  NEUROLOGIC:  No paresthesias, fasciculations, seizures or weakness.  PSYCHIATRIC:  No disorder of thought or mood.  ENDOCRINE:  No heat or cold intolerance, polyuria or polydipsia.  HEMATOLOGICAL:  No easy bruising or bleedings:  .     PHYSICAL EXAMINATION:    GENERAL APPEARANCE:  Pt. is not currently dyspneic, in no distress. Pt. is alert, oriented, and pleasant.  HEENT:  Pupils are normal and react normally. No icterus. Mucous membranes well colored.  NECK:  Supple. No lymphadenopathy. Jugular venous pressure not elevated. Carotids equal.   HEART:   The cardiac impulse has a normal quality. There are no murmurs, rubs or gallops noted  CHEST:  Chest is clear to auscultation. Normal respiratory effort.  ABDOMEN:  Soft and nontender, +BS.  EXTREMITIES:  LLE edema, chronic.  SKIN:  No rash or significant lesions are noted.    I&O's Summary    2020 07:  -  2020 07:00  --------------------------------------------------------  IN: 3334 mL / OUT: 1250 mL / NET: 2084 mL    2020 07:01  -  2020 17:31  --------------------------------------------------------  IN: 0 mL / OUT: 250 mL / NET: -250 mL        LABS:                        10.9   6.35  )-----------( 118      ( 2020 06:27 )             34.0     06-22    142  |  110<H>  |  8   ----------------------------<  109<H>  3.4<L>   |  27  |  0.95    Ca    8.0<L>      2020 06:27          EK2020: NSR@89BPM  QRS:202ms  QT/QTc:486/591ms    TELEMETRY: SB      RADIOLOGY & ADDITIONAL STUDIES: HPI:  76 y/o with a PMHx of IBS, MS, Parkinson's, HLD, presents with c/o emesis that was blood tinged this am. Has had hematemesis in the past, most recently 2020 and was diagnosed with lactic acidosis, Gastric empysema/pneumatosis, Portal venous gas, Grossly distended distal colon; possible related to underlying adynamic colon of MS disorder. Current CT scan results mimic prior CT from 2020. Pt was treated conservatively at that time with NGT decompression, antibiotics. Denies melena, abd pain. Is able to make a BM, pass gas and tolerate PO. Pt is not on any blood thinners.   Pt seen and examined at bedside with chaperone. Pt is AAOx3, pt in no acute distress; pt's speech is clear though limited secondary to his neurologic pathology (MS, parkinsons) pt states stuttering at baseline. Pt denied c/o fever, chills, chest pain, SOB, abd pain, extremity pain or dysfunction, hemoptysis, hematuria, hematochexia, headache, diplopia, vertigo, dizzyness. Pt states (+) void, (+) bowel function, pt states his abdomen has been distended for approx 1 year and has not caused him any pain in that time.     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   Patient noted to have bradycardia on telemetry.  Transferred from ICU to 3 E today.   Patient denies any history of dizziness, lightheadedness, near syncope or syncope.  Patient has not been on any av jacob blockers in the past, denies any history of arrythmia.  EP asked to consult for bradycardia        PAST MEDICAL & SURGICAL HISTORY:  Irritable bowel syndrome with constipation and diarrhea  Parkinsons disease  MS (multiple sclerosis)  S/P placement of cardiac pacemaker  History of brain surgery: for stimulator. ,      MEDICATIONS  (STANDING):  citalopram 20 milliGRAM(s) Oral daily  dextrose 5% + sodium chloride 0.45% with potassium chloride 20 mEq/L 1000 milliLiter(s) (125 mL/Hr) IV Continuous <Continuous>  finasteride 5 milliGRAM(s) Oral daily  magnesium sulfate  IVPB 2 Gram(s) IV Intermittent once  pantoprazole Infusion 8 mG/Hr (10 mL/Hr) IV Continuous <Continuous>  piperacillin/tazobactam IVPB.. 3.375 Gram(s) IV Intermittent every 8 hours  potassium chloride  10 mEq/100 mL IVPB 10 milliEquivalent(s) IV Intermittent once    MEDICATIONS  (PRN):  ondansetron Injectable 4 milliGRAM(s) IV Push every 6 hours PRN Nausea      Allergies    Pineapple (Unknown)  sulfa drugs (Unknown)    Intolerances        SOCIAL HISTORY: Denies tobacco, etoh abuse or illicit drug use    FAMILY HISTORY:  FHx: hypertension: father      Vital Signs Last 24 Hrs  T(C): 36.8 (2020 15:59), Max: 36.8 (2020 20:00)  T(F): 98.3 (2020 15:59), Max: 98.3 (2020 20:00)  HR: 64 (2020 15:59) (39 - 64)  BP: 108/41 (2020 15:59) (77/48 - 109/55)  BP(mean): 58 (2020 14:00) (42 - 71)  RR: 16 (2020 15:59) (4 - 17)  SpO2: 99% (2020 15:59) (93% - 100%)    REVIEW OF SYSTEMS:    CONSTITUTIONAL:  As per HPI.  HEENT:  Eyes:  No diplopia or blurred vision. ENT:  No earache, sore throat or runny nose.  CARDIOVASCULAR:  No pressure, squeezing, strangling, tightness, heaviness or aching about the chest, neck, axilla or epigastrium.  RESPIRATORY:  No cough, shortness of breath, PND or orthopnea.  GASTROINTESTINAL:  No nausea, vomiting or diarrhea.  GENITOURINARY:  No dysuria, frequency or urgency.  MUSCULOSKELETAL:  As per HPI.  SKIN:  No change in skin, hair or nails.  NEUROLOGIC:  No paresthesias, fasciculations, seizures or weakness.  PSYCHIATRIC:  No disorder of thought or mood.  ENDOCRINE:  No heat or cold intolerance, polyuria or polydipsia.  HEMATOLOGICAL:  No easy bruising or bleedings:  .     PHYSICAL EXAMINATION:    GENERAL APPEARANCE:  Pt. is not currently dyspneic, in no distress. Pt. is alert, oriented, and pleasant.  HEENT:  Pupils are normal and react normally. No icterus. Mucous membranes well colored.  NECK:  Supple. No lymphadenopathy. Jugular venous pressure not elevated. Carotids equal.   HEART:   The cardiac impulse has a normal quality. There are no murmurs, rubs or gallops noted  CHEST:  Chest is clear to auscultation. Normal respiratory effort.  ABDOMEN:  Soft and nontender, +BS.  EXTREMITIES:  LLE edema, chronic.  SKIN:  No rash or significant lesions are noted.    I&O's Summary    2020 07:  -  2020 07:00  --------------------------------------------------------  IN: 3334 mL / OUT: 1250 mL / NET: 2084 mL    2020 07:01  -  2020 17:31  --------------------------------------------------------  IN: 0 mL / OUT: 250 mL / NET: -250 mL        LABS:                        10.9   6.35  )-----------( 118      ( 2020 06:27 )             34.0     06-22    142  |  110<H>  |  8   ----------------------------<  109<H>  3.4<L>   |  27  |  0.95    Ca    8.0<L>      2020 06:27          EK2020: NSR@89BPM  QRS:202ms  QT/QTc:486/591ms    TELEMETRY: SB      RADIOLOGY & ADDITIONAL STUDIES:

## 2020-06-22 NOTE — CONSULT NOTE ADULT - PROBLEM SELECTOR RECOMMENDATION 2
No prior evaluation for this or EKG showing its presence but rate is slow to normal speaking of baseline conduction disease.  Only possible metabolic issue could be hypokalemia, suggest repletion.  Will consult EP to deem if candidate for pacemaker and echocardiography for evaluation of afib etiology. No AC given Gi bleeding currently.  Will follow with you.

## 2020-06-22 NOTE — PROGRESS NOTE ADULT - ASSESSMENT
75 Y old male with Gastric emphysema/pneumatosis, recurrent Portal venous gas, recurrent  Grossly distended distal colon; possible related to underlying adynamic colon of MS disorder,   Lactic acidosis resolved  Hematemesis, resolved  Leukocytosis resolved  IV antibiotics  GI on consult, plan per GI for endoscopy  ID on consult  Hospitalist consult pending  D/C NGT, trial of Clears.  Cont current care and meds  Pt stable from surgical standpoint  Serial abd exams benign to date  Hospitalist service consult  Transfer to med surg

## 2020-06-22 NOTE — CONSULT NOTE ADULT - ASSESSMENT
76 y/o with a PMHx of IBS, MS, Parkinson's, HLD, presents with c/o emesis that was blood tinged this am. Has had hematemesis in the past, most recently Feb 2020 and was diagnosed with lactic acidosis, Gastric empysema/pneumatosis, Portal venous gas, Grossly distended distal colon; possible related to underlying adynamic colon of MS disorder. Current CT scan results mimic prior CT from feb 2020. Pt was treated conservatively at that time with NGT decompression, antibiotics. Denies melena, abd pain. Is able to make a BM, pass gas and tolerate PO. Pt is not on any blood thinners.   Pt seen and examined at bedside with chaperone. Pt is AAOx3, pt in no acute distress; pt's speech is clear though limited secondary to his neurologic pathology (MS, parkinsons) pt states stuttering at baseline. Pt denied c/o fever, chills, chest pain, SOB, abd pain, extremity pain or dysfunction, hemoptysis, hematuria, hematochexia, headache, diplopia, vertigo, dizzyness. Pt states (+) void, (+) bowel function, pt states his abdomen has been distended for approx 1 year and has not caused him any pain in that time. 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   Patient noted to have bradycardia on telemetry.  Transferred from ICU to 3 E today.   Patient denies any history of dizziness, lightheadedness, near syncope or syncope.  Patient has not been on any av jacob blockers in the past, denies any history of arrythmia.  EP asked to consult for bradycardia      A/P: Bradycardia  Continue telemonitoring  Monitor electrolytes, replace potassium  Echo  Avoid AV jacob blockers  If patient has persistent bradycardia with symptoms PPM may be indicated  Will continue to follow  Plan discussed with Dr. Hernandez 76 y/o with a PMHx of IBS, MS, Parkinson's, HLD, presents with c/o emesis that was blood tinged this am. Has had hematemesis in the past, most recently Feb 2020 and was diagnosed with lactic acidosis, Gastric empysema/pneumatosis, Portal venous gas, Grossly distended distal colon; possible related to underlying adynamic colon of MS disorder. Current CT scan results mimic prior CT from feb 2020. Pt was treated conservatively at that time with NGT decompression, antibiotics. 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   Patient noted to have bradycardia on telemetry.  Transferred from ICU to 3 E today.   Patient denies any history of dizziness, lightheadedness, near syncope or syncope.  Patient has not been on any av jacob blockers in the past, denies any history of arrythmia.  EP asked to consult for bradycardia      A/P: Bradycardia  Continue telemonitoring  Monitor electrolytes, replace potassium  Echo  Avoid AV jacob blockers  If patient has persistent bradycardia with symptoms PPM may be indicated  Will continue to follow  Plan discussed with Dr. Hernandez

## 2020-06-22 NOTE — PROGRESS NOTE ADULT - SUBJECTIVE AND OBJECTIVE BOX
GI    HPI:  NGT out  no abd pain  lucien clears  +BM  feels improved    ROS  As above  Otherwise unremarkable, all systems reviewed    PE:  Vital Signs Last 24 Hrs  T(C): 36.5 (22 Jun 2020 12:00), Max: 36.8 (21 Jun 2020 20:00)  T(F): 97.7 (22 Jun 2020 12:00), Max: 98.3 (21 Jun 2020 20:00)  HR: 60 (22 Jun 2020 13:00) (39 - 60)  BP: 77/48 (22 Jun 2020 13:00) (77/48 - 116/54)  BP(mean): 51 (22 Jun 2020 13:00) (42 - 71)  RR: 5 (22 Jun 2020 13:00) (4 - 17)  SpO2: 98% (22 Jun 2020 13:00) (93% - 100%)    Constitutional: NAD, obese, delayed speech  Anicteric   Respiratory: CTABL, breathing comfortably  Cardiovascular: S1 and S2, RRR  Gastrointestinal: +BS, soft, non tender, non distended, no mass, obese  Extremities: warm, well perfused, +anasarca/edema  Psychiatric: Normal mood, normal affect  Neuro: moves all extremities, grossly intact  Skin: No rashes or lesions    LABS:

## 2020-06-22 NOTE — PROGRESS NOTE ADULT - SUBJECTIVE AND OBJECTIVE BOX
Date of service: 06-22-20 @ 12:58      Patient lying in bed; eating liquid diet; no pain and no cough or shortness of breath; afebrile      ROS: no fever or chills; denies dizziness, no HA, no SOB or cough, no abdominal pain, no diarrhea or constipation; no dysuria, no urinary frequency, no legs pain, no rashes    MEDICATIONS  (STANDING):  citalopram 20 milliGRAM(s) Oral daily  dextrose 5% + sodium chloride 0.45% with potassium chloride 20 mEq/L 1000 milliLiter(s) (125 mL/Hr) IV Continuous <Continuous>  finasteride 5 milliGRAM(s) Oral daily  pantoprazole Infusion 8 mG/Hr (10 mL/Hr) IV Continuous <Continuous>  piperacillin/tazobactam IVPB.. 3.375 Gram(s) IV Intermittent every 8 hours    MEDICATIONS  (PRN):  ondansetron Injectable 4 milliGRAM(s) IV Push every 6 hours PRN Nausea      Vital Signs Last 24 Hrs  T(C): 36.5 (22 Jun 2020 12:00), Max: 36.8 (21 Jun 2020 20:00)  T(F): 97.7 (22 Jun 2020 12:00), Max: 98.3 (21 Jun 2020 20:00)  HR: 47 (22 Jun 2020 12:00) (39 - 56)  BP: 106/33 (22 Jun 2020 12:00) (86/40 - 116/54)  BP(mean): 42 (22 Jun 2020 12:00) (42 - 71)  RR: 13 (22 Jun 2020 12:00) (4 - 17)  SpO2: 96% (22 Jun 2020 12:00) (93% - 100%)        Physical Exam:        Constitutional: frail looking  HEENT: NC/AT, EOMI, PERRLA, conjunctivae clear; ears and nose atraumatic; pharynx clear; ngt has been removed  Neck: supple; thyroid not palpable  Back: no tenderness  Respiratory: respiratory effort normal; clear to auscultation  Cardiovascular: S1S2 regular, no murmurs  Abdomen: soft, not tender, softly distended, positive BS; no liver or spleen organomegaly  Genitourinary: no suprapubic tenderness  Musculoskeletal: no muscle tenderness, no joint swelling or tenderness  Neurological/ Psychiatric: AxOx3, judgement and insight normal;  moving all extremities  Skin: no rashes; no palpable lesions    Labs: all available labs reviewed                         Labs:                        Labs:                        10.9   6.35  )-----------( 118      ( 22 Jun 2020 06:27 )             34.0     06-22    142  |  110<H>  |  8   ----------------------------<  109<H>  3.4<L>   |  27  |  0.95    Ca    8.0<L>      22 Jun 2020 06:27             Cultures:       Culture - Blood (collected 06-19-20 @ 22:08)  Source: .Blood None  Preliminary Report (06-21-20 @ 06:01):    No growth to date.              < from: CT Abdomen and Pelvis w/ IV Cont (06.19.20 @ 18:52) >    EXAM:  CT ABDOMEN AND PELVIS IC                            PROCEDURE DATE:  06/19/2020          INTERPRETATION:  CLINICAL INFORMATION: Distended abdomen    COMPARISON: 2/19/2020    PROCEDURE:   CT of the Abdomen and Pelvis was performed with intravenous contrast.   Intravenous contrast: 90 ml Omnipaque 350. 10 ml discarded.  Oral contrast: None.  Sagittal and coronal reformats were performed.    FINDINGS:  LOWER CHEST: Within normal limits.    LIVER: Pneumatosis. No focal hepatic masses.  BILE DUCTS: Normal caliber.  GALLBLADDER: Within normal limits.  SPLEEN: Within normal limits.  PANCREAS: Within normal limits.  ADRENALS: Within normal limits.  KIDNEYS/URETERS: Kidneys enhance bilaterally and symmetrically without hydronephrosis. Subcentimeter hypodensities bilaterally to small to characterize. No intrarenal calculi. The ureters are unremarkable.    BLADDER: Calculus within the posterior left bladder.  REPRODUCTIVE ORGANS: Enlarged prostate gland. No pelvic adenopathy or pelvic free fluid.    BOWEL: Findings compatible with emphysematous gastritis with associated pneumatosis. Fluid distended distal esophagus. Large amount of stool distending the rectosigmoid colon similar appearance of prior exam. No definite small bowel obstruction.  PERITONEUM: No ascites.  VESSELS: Atherosclerotic changes of the aorta without aneurysmal dilatation.  RETROPERITONEUM/LYMPH NODES: No lymphadenopathy.    ABDOMINAL WALL: Within normal limits.  BONES: Degenerative changes.    IMPRESSION:     Emphysematous gastritis with associated pneumatosis.    < end of copied text >      Radiology: all available radiological tests reviewed    Advanced directives addressed: DNR

## 2020-06-22 NOTE — CONSULT NOTE ADULT - SUBJECTIVE AND OBJECTIVE BOX
HPI:  74 y/o with a PMHx of IBS, MS, Parkinson's, HLD, presents with emesis that was blood tinged. Has had hematemesis in the past, most recently Feb 2020 and was diagnosed with lactic acidosis, Gastric empysema/pneumatosis, Portal venous gas, Grossly distended distal colon; possible related to underlying adynamic colon of MS disorder. Current CT scan results mimic prior CT from feb 2020. Pt was treated conservatively at that time with NGT decompression, antibiotics. Denies melena, abd pain. Is able to make a BM, pass gas and tolerate PO. Pt is not on any blood thinners.   Hospital course notable for:  Diagnosed with :  Gastric emphysema/pneumatosis, recurrent  Portal venous gas, recurrent  Grossly distended distal colon; possible related to underlying adynamic colon of MS disorder, chronic    His Lactic acidosis resolved, Hematemesis, resolved, Leukocytosis resolved, and he has been on IV antibiotics.     Cardiology now consulted for Bradycardia.  Pt. reports no cardiac issues or history. Denies history of PAF.  Denies dizziness, lightheadedness, palpitations, chest pain, SOB, or syncope.        PAST MEDICAL & SURGICAL HISTORY:  Irritable bowel syndrome with constipation and diarrhea  Parkinsons disease  MS (multiple sclerosis)  S/P placement of neurological stimulator/pacemaker  History of brain surgery: for stimulator. 1997,1998    SOCIAL HISTORY: Non-Smoker/No ETOH/ No [employer] Drug use.  Lives at [facility] normally.     FAMILY HISTORY:  FHx: hypertension: father    Allergies  Pineapple (Unknown)  sulfa drugs (Unknown)    Intolerances        Home Medications:  atorvastatin 10 mg oral tablet: 1 tab(s) orally once a day (at bedtime) (18 Feb 2020 10:06)  CeleXA 20 mg oral tablet: 1 tab(s) orally once a day (18 Feb 2020 10:06)  cholestyramine 4 g/5 g oral powder for reconstitution: 1 packet(s) orally 2 times a day (9 am and 5pm) for chronic diarrhea (18 Feb 2020 10:06)  furosemide 40 mg oral tablet: 1 tab(s) orally once a day (18 Feb 2020 10:06)  Melatonin 3 mg oral tablet: 1 tab(s) orally once (at bedtime) (18 Feb 2020 10:06)  Milk of Magnesia 8% oral suspension: 30 milliliter(s) orally once a day (at bedtime), As Needed (18 Feb 2020 10:06)  pantoprazole 40 mg oral delayed release tablet: 1 tab(s) orally once a day (18 Feb 2020 10:06)  potassium chloride 20 mEq oral tablet, extended release: 1 tab(s) orally 2 times a day (18 Feb 2020 10:06)  Proscar 5 mg oral tablet: 1 tab(s) orally once a day (18 Feb 2020 10:06)  Vitamin D3 50,000 intl units oral capsule: 1 cap(s) orally once a month (on every 28th) (18 Feb 2020 10:06)      HOSPITAL MEDICATIONS:   MEDICATIONS  (STANDING):  citalopram 20 milliGRAM(s) Oral daily  dextrose 5% + sodium chloride 0.45% with potassium chloride 20 mEq/L 1000 milliLiter(s) (125 mL/Hr) IV Continuous <Continuous>  finasteride 5 milliGRAM(s) Oral daily  pantoprazole Infusion 8 mG/Hr (10 mL/Hr) IV Continuous <Continuous>  piperacillin/tazobactam IVPB.. 3.375 Gram(s) IV Intermittent every 8 hours    MEDICATIONS  (PRN):  ondansetron Injectable 4 milliGRAM(s) IV Push every 6 hours PRN Nausea      REVIEW OF SYSTEMS: 13 systems were reviewed and all negative except for comments above.    Vital Signs Last 24 Hrs  T(C): 36.5 (22 Jun 2020 12:00), Max: 36.8 (21 Jun 2020 20:00)  T(F): 97.7 (22 Jun 2020 12:00), Max: 98.3 (21 Jun 2020 20:00)  HR: 47 (22 Jun 2020 12:00) (39 - 56)  BP: 106/33 (22 Jun 2020 12:00) (86/40 - 116/54)  BP(mean): 42 (22 Jun 2020 12:00) (42 - 71)  RR: 13 (22 Jun 2020 12:00) (4 - 17)  SpO2: 96% (22 Jun 2020 12:00) (93% - 100%)Daily     Daily I&O's Summary    21 Jun 2020 07:01  -  22 Jun 2020 07:00  --------------------------------------------------------  IN: 3334 mL / OUT: 1250 mL / NET: 2084 mL        PHYSICAL EXAM:    Constitutional: NAD, awake , well-developed  HEENT: PERRLA, EOMI,  No oral cyanosis. Oropharynx Clean and Dry.  Neck:  supple,  No JVD, No Thyroid enlargement. No Carotid Bruits bilaterally.  Respiratory: Breath sounds are clear bilaterally, No wheezing, rales or rhonchi  Cardiovascular: NL S1 and S2, IR, IR, 1/6 KASSI to RUSB, No s3.  Gastrointestinal: Bowel Sounds present, soft.   Extremities: No peripheral edema. No clubbing or cyanosis.    Vascular: 1+ peripheral pulses in LE   Musculoskeletal: no calf tenderness .  Skin: No rashes.      LABS: All Labs Reviewed:                        10.9   6.35  )-----------( 118      ( 22 Jun 2020 06:27 )             34.0                         11.7   9.84  )-----------( 124      ( 21 Jun 2020 06:40 )             36.0                         12.8   12.15 )-----------( 161      ( 20 Jun 2020 06:25 )             39.1     22 Jun 2020 06:27    142    |  110    |  8      ----------------------------<  109    3.4     |  27     |  0.95   21 Jun 2020 06:40    143    |  114    |  14     ----------------------------<  86     3.3     |  26     |  1.01   20 Jun 2020 06:25    142    |  113    |  21     ----------------------------<  126    3.9     |  26     |  1.17     Ca    8.0        22 Jun 2020 06:27  Ca    8.4        21 Jun 2020 06:40  Ca    8.3        20 Jun 2020 06:25    TPro  8.5    /  Alb  3.4    /  TBili  0.8    /  DBili  x      /  AST  24     /  ALT  18     /  AlkPhos  116    19 Jun 2020 17:10    RADIOLOGY:  < from: Xray Chest 1 View- PORTABLE-Urgent (06.20.20 @ 00:23) >  INTERPRETATION:  Portable chest radiograph      CLINICAL INFORMATION:   Insertion of NG tube.  TECHNIQUE: The lower chest upper abdomen radiograph. COMPARISON: No previous examinations are available for review.  FINDINGS:   N tube tip in body of stomach.  Visualized osseous structures are intact.  IMPRESSION: NG tube tip in body of stomach...  < end of copied text >  EKG:  < from: 12 Lead ECG (06.19.20 @ 17:40) >   Atrial fibrillation  Non-specific intra-ventricular conduction block  Inferior infarct , age undetermined  T wave abnormality, consider anterolateral ischemia  Abnormal ECG    < end of copied text >

## 2020-06-22 NOTE — PROGRESS NOTE ADULT - ASSESSMENT
74 y/o with a PMHx of IBS, MS, Parkinson's, HLD, [place], prior admission in February for gastritis admitted on 6/19 for evaluation of vomiting blood tinged vomitus similar to that on prior exam; the patient denies constipation and had bowel movements; notes a distended abdomen for some time; no fever chills, cough or shortness of breath. Patient has underlying stutter due to his neurologic issues.   1. Patient admitted with emphysematous gastritis secondary to underlying neurologic disorders; also noted with leukocytosis most likely reactive to infection  - patient will be at risk for bowel/GI translocation due to inflammatory process  - follow up cultures   - iv hydration and supportive care   - serial cbc and monitor temperature   - reviewed prior medical records to evaluate for resistant or atypical pathogens   - diet per surgery  - day #3 zosyn   - tolerating antibiotics without rashes or side effects   - GI evaluation in progress  2. other issues: per medicine

## 2020-06-23 NOTE — PROGRESS NOTE ADULT - PROBLEM SELECTOR PLAN 1
HRs currently 50-60s.  EP consulted, recommend electrolyte repletion & ziopatch w/ OP EP followup.  Echo w/ no significant structural abnormalities.  Will sign off please call if questions.

## 2020-06-23 NOTE — DISCHARGE NOTE PROVIDER - NSDCMRMEDTOKEN_GEN_ALL_CORE_FT
atorvastatin 10 mg oral tablet: 1 tab(s) orally once a day (at bedtime)  Augmentin 875 mg-125 mg oral tablet: 1 tab(s) orally every 12 hours MDD:2  CeleXA 20 mg oral tablet: 1 tab(s) orally once a day  cholestyramine 4 g/5 g oral powder for reconstitution: 1 packet(s) orally 2 times a day (9 am and 5pm) for chronic diarrhea  finasteride 5 mg oral tablet: 1 tab(s) orally once a day  furosemide 40 mg oral tablet: 1 tab(s) orally once a day  Melatonin 3 mg oral tablet: 1 tab(s) orally once (at bedtime)  Milk of Magnesia 8% oral suspension: 30 milliliter(s) orally once a day (at bedtime), As Needed  pantoprazole 40 mg oral delayed release tablet: 1 tab(s) orally once a day  potassium chloride 20 mEq oral tablet, extended release: 1 tab(s) orally 2 times a day  Vitamin D3 50,000 intl units oral capsule: 1 cap(s) orally once a month (on every 28th)

## 2020-06-23 NOTE — DISCHARGE NOTE NURSING/CASE MANAGEMENT/SOCIAL WORK - NSDCFUADDAPPT_GEN_ALL_CORE_FT
APPOINTMENT: Tuesday June 30, 2020 at 3:00pm(arrive by 2:45p)-Dr Ponce; [facility] ([phone])  [address] [place] [address]  *** MUST bring Identification and $50 Fee for 1st appointment ***

## 2020-06-23 NOTE — PROGRESS NOTE ADULT - REASON FOR ADMISSION
hematemesis

## 2020-06-23 NOTE — CONSULT NOTE ADULT - SUBJECTIVE AND OBJECTIVE BOX
REQUESTING PHYSICIAN: Irma    REASON FOR CONSULT: Medical management     CHIEF COMPLAINT: Hematemesis     HPI:  76 y/o with a PMHx of IBS, MS, Parkinson's, HLD, presents with c/o emesis that was blood tinged this am. Has had hematemesis in the past, most recently Feb 2020 and was diagnosed with lactic acidosis, Gastric empysema/pneumatosis, Portal venous gas, Grossly distended distal colon; possible related to underlying adynamic colon of MS disorder. Current CT scan results mimic prior CT from feb 2020. Pt was treated conservatively at that time with NGT decompression, antibiotics. Denies melena, abd pain. Is able to make a BM, pass gas and tolerate PO. Pt is not on any blood thinners.     Pt seen and examined at bedside with chaperone. Pt is AAOx3, pt in no acute distress; pt's speech is clear though limited secondary to his neurologic pathology (MS, parkinsons) pt states stuttering at baseline. Pt denied c/o fever, chills, chest pain, SOB, abd pain, extremity pain or dysfunction, hemoptysis, hematuria, hematochexia, headache, diplopia, vertigo, dizzyness. Pt states (+) void, (+) bowel function, pt states his abdomen has been distended for approx 1 year and has not caused him any pain in that time (19 Jun 2020 21:22)      PAST MEDICAL & SURGICAL HISTORY:  Irritable bowel syndrome with constipation and diarrhea  Parkinsons disease  MS (multiple sclerosis)  S/P placement of cardiac pacemaker  History of brain surgery: for stimulator. 1997,1998    FAMILY HISTORY:  FHx: hypertension: father    Social History:    Vital Signs Last 24 Hrs  T(C): 36.6 (23 Jun 2020 07:47), Max: 36.8 (22 Jun 2020 15:59)  T(F): 97.9 (23 Jun 2020 07:47), Max: 98.3 (22 Jun 2020 15:59)  HR: 60 (23 Jun 2020 07:47) (60 - 66)  BP: 134/65 (23 Jun 2020 07:47) (104/55 - 134/65)  BP(mean): --  RR: 18 (23 Jun 2020 07:47) (16 - 18)  SpO2: 98% (23 Jun 2020 07:47) (98% - 99%)    I&O's Summary    22 Jun 2020 07:01  -  23 Jun 2020 07:00  --------------------------------------------------------  IN: 0 mL / OUT: 250 mL / NET: -250 mL        MEDICATIONS:  MEDICATIONS  (STANDING):  citalopram 20 milliGRAM(s) Oral daily  dextrose 5% + sodium chloride 0.45% with potassium chloride 20 mEq/L 1000 milliLiter(s) (125 mL/Hr) IV Continuous <Continuous>  finasteride 5 milliGRAM(s) Oral daily  pantoprazole    Tablet 40 milliGRAM(s) Oral daily  piperacillin/tazobactam IVPB.. 3.375 Gram(s) IV Intermittent every 8 hours      LABS: All Labs Reviewed:                        11.7   5.30  )-----------( 130      ( 23 Jun 2020 07:38 )             35.4     06-23    141  |  111<H>  |  4<L>  ----------------------------<  106<H>  3.3<L>   |  25  |  0.84    Ca    8.3<L>      23 Jun 2020 07:38      PT/INR - ( 23 Jun 2020 07:38 )   PT: 12.4 sec;   INR: 1.11 ratio         PTT - ( 23 Jun 2020 07:38 )  PTT:29.2 sec      Blood Culture: Organism --  Gram Stain Blood -- Gram Stain --  Specimen Source .Blood None  Culture-Blood --        RADIOLOGY/EKG:  < from: CT Abdomen and Pelvis w/ IV Cont (06.19.20 @ 18:52) >  IMPRESSION:   Emphysematous gastritis with associated pneumatosis.  < end of copied text >      Assessment and Plan:    75 Y old male with Gastric emphysema/pneumatosis, recurrent Portal venous gas, recurrent  Grossly distended distal colon; possible related to underlying adynamic colon of MS disorder    Management as per surgery  IV antibiotics - day #4, ID f/u appreciated   GI on consult, s/p endoscopy -- revealed gastritis, biopsies sent f/u outptient   diet advanced to mechanical soft -- tolerating   bradycardia improved, seen by cardio and EP -- zioptach prior to d/c and outpatient f/u    thank you for this consult. REQUESTING PHYSICIAN: Irma    REASON FOR CONSULT: Medical management     CHIEF COMPLAINT: Hematemesis     HPI:  74 y/o with a PMHx of IBS, MS, Parkinson's, HLD, presents with c/o emesis that was blood tinged this am. Has had hematemesis in the past, most recently Feb 2020 and was diagnosed with lactic acidosis, Gastric empysema/pneumatosis, Portal venous gas, Grossly distended distal colon; possible related to underlying adynamic colon of MS disorder. Current CT scan results mimic prior CT from feb 2020. Pt was treated conservatively at that time with NGT decompression, antibiotics. Denies melena, abd pain. Is able to make a BM, pass gas and tolerate PO. Pt is not on any blood thinners.     Pt seen and examined at bedside with chaperone. Pt is AAOx3, pt in no acute distress; pt's speech is clear though limited secondary to his neurologic pathology (MS, parkinsons) pt states stuttering at baseline. Pt denied c/o fever, chills, chest pain, SOB, abd pain, extremity pain or dysfunction, hemoptysis, hematuria, hematochexia, headache, diplopia, vertigo, dizzyness. Pt states (+) void, (+) bowel function, pt states his abdomen has been distended for approx 1 year and has not caused him any pain in that time (19 Jun 2020 21:22)      PAST MEDICAL & SURGICAL HISTORY:  Irritable bowel syndrome with constipation and diarrhea  Parkinsons disease  MS (multiple sclerosis)  S/P placement of cardiac pacemaker  History of brain surgery: for stimulator. 1997,1998    FAMILY HISTORY:  FHx: hypertension: father    PHYSICAL EXAM:  Constitutional: awake, alert, NAD  Respiratory: cta b/l  Cardiovascular: s1 s2 rrr, no murmur  Gastrointestinal: soft, obese, nontender  Extremities: moves all extremities, no weakness   Neurological: A&ox3    Vital Signs Last 24 Hrs  T(C): 36.6 (23 Jun 2020 07:47), Max: 36.8 (22 Jun 2020 15:59)  T(F): 97.9 (23 Jun 2020 07:47), Max: 98.3 (22 Jun 2020 15:59)  HR: 60 (23 Jun 2020 07:47) (60 - 66)  BP: 134/65 (23 Jun 2020 07:47) (104/55 - 134/65)  BP(mean): --  RR: 18 (23 Jun 2020 07:47) (16 - 18)  SpO2: 98% (23 Jun 2020 07:47) (98% - 99%)    I&O's Summary    22 Jun 2020 07:01  -  23 Jun 2020 07:00  --------------------------------------------------------  IN: 0 mL / OUT: 250 mL / NET: -250 mL        MEDICATIONS:  MEDICATIONS  (STANDING):  citalopram 20 milliGRAM(s) Oral daily  dextrose 5% + sodium chloride 0.45% with potassium chloride 20 mEq/L 1000 milliLiter(s) (125 mL/Hr) IV Continuous <Continuous>  finasteride 5 milliGRAM(s) Oral daily  pantoprazole    Tablet 40 milliGRAM(s) Oral daily  piperacillin/tazobactam IVPB.. 3.375 Gram(s) IV Intermittent every 8 hours      LABS: All Labs Reviewed:                        11.7   5.30  )-----------( 130      ( 23 Jun 2020 07:38 )             35.4     06-23    141  |  111<H>  |  4<L>  ----------------------------<  106<H>  3.3<L>   |  25  |  0.84    Ca    8.3<L>      23 Jun 2020 07:38      PT/INR - ( 23 Jun 2020 07:38 )   PT: 12.4 sec;   INR: 1.11 ratio         PTT - ( 23 Jun 2020 07:38 )  PTT:29.2 sec      Blood Culture: Organism --  Gram Stain Blood -- Gram Stain --  Specimen Source .Blood None  Culture-Blood --        RADIOLOGY/EKG:  < from: CT Abdomen and Pelvis w/ IV Cont (06.19.20 @ 18:52) >  IMPRESSION:   Emphysematous gastritis with associated pneumatosis.  < end of copied text >      Assessment and Plan:    75 Y old male with Gastric emphysema/pneumatosis, recurrent Portal venous gas, recurrent  Grossly distended distal colon; possible related to underlying adynamic colon of MS disorder    Management as per surgery  IV antibiotics - day #4, ID f/u appreciated   GI on consult, s/p endoscopy -- revealed gastritis, biopsies sent f/u outptient   diet advanced to mechanical soft -- tolerating   bradycardia improved, seen by cardio and EP -- zioptach prior to d/c and outpatient f/u    thank you for this consult.

## 2020-06-23 NOTE — DISCHARGE NOTE NURSING/CASE MANAGEMENT/SOCIAL WORK - PATIENT PORTAL LINK FT
You can access the FollowMyHealth Patient Portal offered by [facility] by registering at the following website: [email or web address]. By joining [employer]’s FollowMyHealth portal, you will also be able to view your health information using other applications (apps) compatible with our system.

## 2020-06-23 NOTE — CHART NOTE - NSCHARTNOTEFT_GEN_A_CORE
HPI:  76 y/o with a PMHx of IBS, MS, Parkinson's, HLD, presents with c/o emesis that was blood tinged this am. Has had hematemesis in the past, most recently Feb 2020 and was diagnosed with lactic acidosis, Gastric empysema/pneumatosis, Portal venous gas, Grossly distended distal colon; possible related to underlying adynamic colon of MS disorder. Current CT scan results mimic prior CT from feb 2020. Pt was treated conservatively at that time with NGT decompression, antibiotics. Denies melena, abd pain. Is able to make a BM, pass gas and tolerate PO. Pt is not on any blood thinners.   Pt seen and examined at bedside with chaperone. Pt is AAOx3, pt in no acute distress; pt's speech is clear though limited secondary to his neurologic pathology (MS, parkinsons) pt states stuttering at baseline. Pt denied c/o fever, chills, chest pain, SOB, abd pain, extremity pain or dysfunction, hemoptysis, hematuria, hematochexia, headache, diplopia, vertigo, dizzyness. Pt states (+) void, (+) bowel function, pt states his abdomen has been distended for approx 1 year and has not caused him any pain in that time.     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   Patient noted to have bradycardia on telemetry.  Transferred from ICU to 3 E today.   Patient denies any history of dizziness, lightheadedness, near syncope or syncope.  Patient has not been on any av jacob blockers in the past, denies any history of arrythmia.  EP asked to consult for bradycardia.    6/23/2020: Patient seen at bedside, feeling well.  Denies any CP, palpitations, SOB, dizziness, lightheadedness.  Tele reviewed and show no further significant bradycardia.  TELE: SB 50-60s    ICU Vital Signs Last 24 Hrs  T(C): 36.6 (23 Jun 2020 07:47), Max: 36.8 (22 Jun 2020 15:59)  T(F): 97.9 (23 Jun 2020 07:47), Max: 98.3 (22 Jun 2020 15:59)  HR: 60 (23 Jun 2020 07:47) (47 - 66)  BP: 134/65 (23 Jun 2020 07:47) (77/48 - 134/65)  BP(mean): 58 (22 Jun 2020 14:00) (42 - 58)  ABP: --  ABP(mean): --  RR: 18 (23 Jun 2020 07:47) (5 - 18)  SpO2: 98% (23 Jun 2020 07:47) (96% - 99%)    MEDICATIONS  (STANDING):  citalopram 20 milliGRAM(s) Oral daily  dextrose 5% + sodium chloride 0.45% with potassium chloride 20 mEq/L 1000 milliLiter(s) (125 mL/Hr) IV Continuous <Continuous>  finasteride 5 milliGRAM(s) Oral daily  pantoprazole    Tablet 40 milliGRAM(s) Oral daily  piperacillin/tazobactam IVPB.. 3.375 Gram(s) IV Intermittent every 8 hours    MEDICATIONS  (PRN):  ondansetron Injectable 4 milliGRAM(s) IV Push every 6 hours PRN Nausea      Cardiology Results  < from: TTE Echo Complete w/o Contrast w/ Doppler (06.22.20 @ 20:23) >    Impression     Summary     Estimated left ventricular ejection fraction is 60-65 %.   The left ventricle is normal in size, wall thickness, wall motion and   contractility as seen in limited views.   The left atrium is moderately dilated.   Normal appearing right atrium.  Normal appearing right ventricle structure and function.   A device wire is seen in the RV and RA.   The aortic valve is trileaflet with thin pliable leaflets.   The mitral valve leaflets appear thin and normal.   Mild (1+) mitral regurgitation is present.   Normal appearing tricuspid valve structure.   Mild (1+) tricuspid valve regurgitation is present.   Pulmonic valve not well seen.   Trace pulmonic valvular regurgitation is present.   No evidence of pericardial effusion.   No evidence of pleural effusion.   IVC was not visualized within the subcostal view.      A/P: Bradycardia, asymptomatic  Patient without further significant bradycardia.  Ziopatch to be applied prior to discharge  Stable for discharge from EP standpoint  Outpatient EP follow up  Plan discussed with Dr. Hernandez, patient and inpatient team. HPI:  74 y/o with a PMHx of IBS, MS, Parkinson's, HLD, presents with c/o emesis that was blood tinged this am. Has had hematemesis in the past, most recently Feb 2020 and was diagnosed with lactic acidosis, Gastric empysema/pneumatosis, Portal venous gas, Grossly distended distal colon; possible related to underlying adynamic colon of MS disorder. Current CT scan results mimic prior CT from feb 2020. Pt was treated conservatively at that time with NGT decompression, antibiotics. Denies melena, abd pain. Is able to make a BM, pass gas and tolerate PO. Pt is not on any blood thinners.   Pt seen and examined at bedside with chaperone. Pt is AAOx3, pt in no acute distress; pt's speech is clear though limited secondary to his neurologic pathology (MS, parkinsons) pt states stuttering at baseline. Pt denied c/o fever, chills, chest pain, SOB, abd pain, extremity pain or dysfunction, hemoptysis, hematuria, hematochexia, headache, diplopia, vertigo, dizzyness. Pt states (+) void, (+) bowel function, pt states his abdomen has been distended for approx 1 year and has not caused him any pain in that time.     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   Patient noted to have bradycardia on telemetry.  Transferred from ICU to 3 E today.   Patient denies any history of dizziness, lightheadedness, near syncope or syncope.  Patient has not been on any av jacob blockers in the past, denies any history of arrythmia.  EP asked to consult for bradycardia.    6/23/2020: Patient seen at bedside, feeling well.  Denies any CP, palpitations, SOB, dizziness, lightheadedness.  Tele reviewed and show no further significant bradycardia.  TELE: SB 50-60s    ICU Vital Signs Last 24 Hrs  T(C): 36.6 (23 Jun 2020 07:47), Max: 36.8 (22 Jun 2020 15:59)  T(F): 97.9 (23 Jun 2020 07:47), Max: 98.3 (22 Jun 2020 15:59)  HR: 60 (23 Jun 2020 07:47) (47 - 66)  BP: 134/65 (23 Jun 2020 07:47) (77/48 - 134/65)  BP(mean): 58 (22 Jun 2020 14:00) (42 - 58)  ABP: --  ABP(mean): --  RR: 18 (23 Jun 2020 07:47) (5 - 18)  SpO2: 98% (23 Jun 2020 07:47) (96% - 99%)    MEDICATIONS  (STANDING):  citalopram 20 milliGRAM(s) Oral daily  dextrose 5% + sodium chloride 0.45% with potassium chloride 20 mEq/L 1000 milliLiter(s) (125 mL/Hr) IV Continuous <Continuous>  finasteride 5 milliGRAM(s) Oral daily  pantoprazole    Tablet 40 milliGRAM(s) Oral daily  piperacillin/tazobactam IVPB.. 3.375 Gram(s) IV Intermittent every 8 hours    MEDICATIONS  (PRN):  ondansetron Injectable 4 milliGRAM(s) IV Push every 6 hours PRN Nausea    06-23    141  |  111<H>  |  4<L>  ----------------------------<  106<H>  3.3<L>   |  25  |  0.84    Ca    8.3<L>      23 Jun 2020 07:38                          11.7   5.30  )-----------( 130      ( 23 Jun 2020 07:38 )             35.4       Cardiology Results  < from: TTE Echo Complete w/o Contrast w/ Doppler (06.22.20 @ 20:23) >    Impression     Summary     Estimated left ventricular ejection fraction is 60-65 %.   The left ventricle is normal in size, wall thickness, wall motion and   contractility as seen in limited views.   The left atrium is moderately dilated.   Normal appearing right atrium.  Normal appearing right ventricle structure and function.   A device wire is seen in the RV and RA.   The aortic valve is trileaflet with thin pliable leaflets.   The mitral valve leaflets appear thin and normal.   Mild (1+) mitral regurgitation is present.   Normal appearing tricuspid valve structure.   Mild (1+) tricuspid valve regurgitation is present.   Pulmonic valve not well seen.   Trace pulmonic valvular regurgitation is present.   No evidence of pericardial effusion.   No evidence of pleural effusion.   IVC was not visualized within the subcostal view.      A/P: Bradycardia, asymptomatic  Patient without further significant bradycardia.  Monitor electrolytes, replace Potassium  Ziopatch to be applied prior to discharge  Further care per primary team  Stable for discharge from EP standpoint  Outpatient EP follow up  Plan discussed with Dr. Hernandez, patient and inpatient team.

## 2020-06-23 NOTE — DISCHARGE NOTE PROVIDER - HOSPITAL COURSE
76 y/o with a PMHx of IBS, MS, Parkinson's, HLD, presents with c/o emesis that was blood tinged this am. Has had hematemesis in the past, most recently Feb 2020 and was diagnosed with lactic acidosis, Gastric empysema/pneumatosis, Portal venous gas, Grossly distended distal colon; possible related to underlying adynamic colon of MS disorder. Current CT scan results mimic prior CT from feb 2020. Pt was treated conservatively at that time with NGT decompression, antibiotics. Denies melena, abd pain. Is able to make a BM, pass gas and tolerate PO. Pt is not on any blood thinners.         6/23 Clinically stable, noon tender, tolerating diet and wants to go home.        Vital Signs Last 24 Hrs    T(C): 36.6 (23 Jun 2020 07:47), Max: 36.8 (22 Jun 2020 15:59)    T(F): 97.9 (23 Jun 2020 07:47), Max: 98.3 (22 Jun 2020 15:59)    HR: 60 (23 Jun 2020 07:47) (60 - 66)    BP: 134/65 (23 Jun 2020 07:47) (104/55 - 134/65)    RR: 18 (23 Jun 2020 07:47) (16 - 18)    SpO2: 98% (23 Jun 2020 07:47) (98% - 99%)

## 2020-06-23 NOTE — DISCHARGE NOTE PROVIDER - CARE PROVIDER_API CALL
Orlin Boston)  Gastroenterology; Internal Medicine  [address] [place] [address]  Phone: [phone]  Fax: [phone]  Follow Up Time:     Dandy Valadez  SURGERY  [address] [place] [address]  Phone: [phone]  Fax: [phone]  Follow Up Time:

## 2020-06-23 NOTE — PROGRESS NOTE ADULT - SUBJECTIVE AND OBJECTIVE BOX
74 y/o with a PMHx of IBS, MS, Parkinson's, HLD, admitted for gastric emphysema/pneumatosis recurrent & grossly distended distal colon possibly due to adynamic colon.  Followed by surgery & GI service.  Consulted for bradycardia.  HRs in 40-50s 6/21.  EP consulted.  HRs in 50-60s 6/22.  EP reccomends OP monitoring, no indications for PM.  Yesterday's consult & ECG official interpretation describe afib.  However on close review of ECG & tele monitoring, pt has sinus rhythm w/ low amplitude P waves & extensive ECG artifact.    6/23/20: sinus presley/sinus rhythm 50-60s.  No cardiac symptoms: no chest pain, palpitations, dyspnea.    MEDICATIONS:  MEDICATIONS  (STANDING):  citalopram 20 milliGRAM(s) Oral daily  dextrose 5% + sodium chloride 0.45% with potassium chloride 20 mEq/L 1000 milliLiter(s) (125 mL/Hr) IV Continuous <Continuous>  finasteride 5 milliGRAM(s) Oral daily  pantoprazole    Tablet 40 milliGRAM(s) Oral daily  piperacillin/tazobactam IVPB.. 3.375 Gram(s) IV Intermittent every 8 hours    MEDICATIONS  (PRN):  ondansetron Injectable 4 milliGRAM(s) IV Push every 6 hours PRN Nausea      Vital Signs Last 24 Hrs  T(C): 36.6 (23 Jun 2020 07:47), Max: 36.8 (22 Jun 2020 15:59)  T(F): 97.9 (23 Jun 2020 07:47), Max: 98.3 (22 Jun 2020 15:59)  HR: 60 (23 Jun 2020 07:47) (60 - 66)  BP: 134/65 (23 Jun 2020 07:47) (77/48 - 134/65)  BP(mean): 58 (22 Jun 2020 14:00) (51 - 58)  RR: 18 (23 Jun 2020 07:47) (5 - 18)  SpO2: 98% (23 Jun 2020 07:47) (98% - 99%)    I&O's Summary    22 Jun 2020 07:01  -  23 Jun 2020 07:00  --------------------------------------------------------  IN: 0 mL / OUT: 250 mL / NET: -250 mL      PHYSICAL EXAM:    Constitutional: NAD, awake , well-developed  HEENT: PERRLA, EOMI,  No oral cyanosis. Oropharynx Clean and Dry.  Neck:  supple,  No JVD, No Thyroid enlargement. No Carotid Bruits bilaterally.  Respiratory: Breath sounds are clear bilaterally, No wheezing, rales or rhonchi  Cardiovascular: NL S1 and S2, IR, IR, 1/6 KASSI to RUSB, No s3.  Gastrointestinal: Bowel Sounds present, soft.   Extremities: No peripheral edema. No clubbing or cyanosis.    Vascular: 1+ peripheral pulses in LE   Musculoskeletal: no calf tenderness .  Skin: No rashes.      LABS: All Labs Reviewed:                        11.7   5.30  )-----------( 130      ( 23 Jun 2020 07:38 )             35.4                         10.9   6.35  )-----------( 118      ( 22 Jun 2020 06:27 )             34.0                         11.7   9.84  )-----------( 124      ( 21 Jun 2020 06:40 )             36.0     23 Jun 2020 07:38    141    |  111    |  4      ----------------------------<  106    3.3     |  25     |  0.84   22 Jun 2020 06:27    142    |  110    |  8      ----------------------------<  109    3.4     |  27     |  0.95   21 Jun 2020 06:40    143    |  114    |  14     ----------------------------<  86     3.3     |  26     |  1.01     Ca    8.3        23 Jun 2020 07:38  Ca    8.0        22 Jun 2020 06:27  Ca    8.4        21 Jun 2020 06:40    PT/INR - ( 23 Jun 2020 07:38 )   PT: 12.4 sec;   INR: 1.11 ratio    PTT - ( 23 Jun 2020 07:38 )  PTT:29.2 sec    RADIOLOGY:  < from: Xray Chest 1 View- PORTABLE-Urgent (06.20.20 @ 00:23) >  INTERPRETATION:  Portable chest radiograph      CLINICAL INFORMATION:   Insertion of NG tube.  TECHNIQUE: The lower chest upper abdomen radiograph. COMPARISON: No previous examinations are available for review.  FINDINGS:   N tube tip in body of stomach.  Visualized osseous structures are intact.  IMPRESSION: NG tube tip in body of stomach...  < end of copied text >  EKG:  < from: 12 Lead ECG (06.19.20 @ 17:40) >   Atrial fibrillation (on my review, NSR w/ artifact)  Non-specific intra-ventricular conduction block  Inferior infarct , age undetermined  T wave abnormality, consider anterolateral ischemia  Abnormal ECG  	  < end of copied text >  ========================    < from: TTE Echo Complete w/o Contrast w/ Doppler (06.22.20 @ 20:23) >   Impression     Summary     Estimated left ventricular ejection fraction is 60-65 %.   The left ventricle is normal in size, wall thickness, wall motion and   contractility as seen in limited views.   The left atrium is moderately dilated.   Normal appearing right atrium.  Normal appearing right ventricle structure and function.   A device wire is seen in the RV and RA.   The aortic valve is trileaflet with thin pliable leaflets.   The mitral valve leaflets appear thin and normal.   Mild (1+) mitral regurgitation is present.   Normal appearing tricuspid valve structure.   Mild (1+) tricuspid valve regurgitation is present.   Pulmonic valve not well seen.   Trace pulmonic valvular regurgitation is present.   No evidence of pericardial effusion.   No evidence of pleural effusion.   IVC was not visualized within the subcostal view.     Signature     ----------------------------------------------------------------   Electronically signed by Jw Butt MD(Interpreting   physician) on 06/22/2020 09:45 PM   ----------------------------------------------------------------    < end of copied text >

## 2020-07-20 PROBLEM — R00.1 BRADYCARDIA: Status: ACTIVE | Noted: 2020-01-01

## 2020-07-20 NOTE — REASON FOR VISIT
[Follow-Up - From Hospitalization] : follow-up of a recent hospitalization for [FreeTextEntry1] : sinus bradycardia

## 2020-07-20 NOTE — HISTORY OF PRESENT ILLNESS
[FreeTextEntry1] : 74 y/o with a PMHx of IBS, MS, Parkinson's, HLD, gastroperesis who was admitted to  for hematemesis and was managed initially in ICU in setting of elevated lactic acidosis. On telemetry pt was noted to have sinus bradycardia. ECGs in hospital showed NSR, sinus tachycardia, nml qrs axis\par \par Cardiac work up:\par Zio patch:

## 2020-12-11 NOTE — ED ADULT NURSE NOTE - NSIMPLEMENTINTERV_GEN_ALL_ED
Implemented All Fall Risk Interventions:  [place] to call system. Call bell, personal items and telephone within reach. Instruct patient to call for assistance. Room bathroom lighting operational. Non-slip footwear when patient is off stretcher. Physically safe environment: no spills, clutter or unnecessary equipment. Stretcher in lowest position, wheels locked, appropriate side rails in place. Provide visual cue, wrist band, yellow gown, etc. Monitor gait and stability. Monitor for mental status changes and reorient to person, place, and time. Review medications for side effects contributing to fall risk. Reinforce activity limits and safety measures with patient and family.

## 2020-12-11 NOTE — ED ADULT NURSE NOTE - CHIEF COMPLAINT QUOTE
VALEIRE from [facility] for resp distress, DNR/DNI, arrives with OPA/NPA and on BVM by EMS. Patient was saturating @50% on arrival and responded to interventions. NATALYA perez RN/MD at bedside. EKG in progress.

## 2020-12-11 NOTE — ED ADULT TRIAGE NOTE - CHIEF COMPLAINT QUOTE
VALERIE from [facility] for resp distress, DNR/DNI, arrives with OPA/NPA and on BVM by EMS. Patient was saturating @50% on arrival and responded to interventions. NATALYA perez RN/MD at bedside. EKG in progress.

## 2020-12-12 NOTE — CONSULT NOTE ADULT - ASSESSMENT
75 y old male with H/O MS, Parkinson ch GI dysmotility and multiple admission gastric pneumatosis presented  with respiratory distress, hypotension sepsis, found to have persistent gastric pneumatosis with free air around gastric fundus, GE junction Pt on two pressors with SBP in 70s to low 80s. Pt is DNR, DNI. I disused with son Brody at length at this time pt is not a surgical candidate, will not survive anesthesia and perioperative mortality is very high, he had denied surgery int h past. Family want trial of pressors resuscitation with fluids and antibiotics They  don't want invasive intervention considering  ch nature of his disease process.  discuss with ICU   IV hydration   NGT  PPI  IV antibiotics  Pressors as needed  Poor prognosis  Palliate consult in am

## 2020-12-12 NOTE — H&P ADULT - HISTORY OF PRESENT ILLNESS
74 y/o M with a h/o IBS, MS, colonic distention (adynamic colon?), Parkinson's, HLD, presents with respiratory distress and hypoxemia. Noted to have large, firm, distended abdomen. Profound shock state requiring massive amount of norepinephrine infusion to maintain hemodynamics. Lactate of 11+. Patient is obtunded with moaning/grunting and increased work of breathing and hypoxemia despite NRB mask with 100% FiO2. CT A/P reveals gastric perforation. Patient has been seen by surgeon in ED who stated he is not a candidate for surgical intervention given hemodynamic instability. Patient has existing DNR/DNI advanced directives which have been confirmed by family, although at this time they state that they would like to pursue all aggressive measures other than CPR/endotracheal intubation at this time.

## 2020-12-12 NOTE — ED PROVIDER NOTE - PROGRESS NOTE DETAILS
IVF held to only 1 L NS due to pt with BLE edema up to abd with distended abd concerning for possible CHF case d/w Mino (son) and states would prefer to have any means available to maintain BP.  states pt is DNR/DNI and agree with any other measures for support. pt on levophed with persistent hypotension.   case d/w Dr Fernandez and will accept to ICU under Gomez CT results noted.   case d/w Dr Solis and will evaluate pt

## 2020-12-12 NOTE — H&P ADULT - MENTAL STATUS
obtunded, eyes closed, moaning/grunting with distressed respirations, minimal spontaneous movements of extremities

## 2020-12-12 NOTE — PROGRESS NOTE ADULT - REASON FOR ADMISSION
Septic shock, gastric perforation, acute hypoxemic respiratory failure
Septic shock, gastric perforation, acute hypoxemic respiratory failure

## 2020-12-12 NOTE — DISCHARGE NOTE FOR THE EXPIRED PATIENT - HOSPITAL COURSE
Patient with hx. Parkinsons disease, MS was admitted with abdominal distention  found to have perforation of stomach  Was in septic shock, was maede DDNR/DNI by family  They withdrew pressors and he  on  at 2:15 pm

## 2020-12-12 NOTE — H&P ADULT - ASSESSMENT
76 y/o M with a h/o IBS, MS, colonic distention (adynamic colon?), Parkinson's, HLD, with septic shock, gastric perforation, acute hypoxemic respiratory failure, RAYNA.    Admit to ICU for further management.    Case discussed in detail with eICU physician, Dr. Sewell, and surgeon, Dr. Almonte.        CRITICAL CARE TIME SPENT: 85 mins  Time spent evaluating/treating patient with medical issues that pose a high risk for life threatening deterioration and/or end-organ damage, reviewing data/labs/imaging, discussing case with multidisciplinary team, discussing plan/goals of care with patient/family. Non-inclusive of procedure time.

## 2020-12-12 NOTE — H&P ADULT - PROBLEM SELECTOR PLAN 3
Secondary to metabolic encephalopathy and possible aspiration event. Currently on NRB mask with 100% FiO2 and declining SpO2. Endotracheal intubation/mechanical ventilation not an option. Not a candidate for NIPPV.

## 2020-12-12 NOTE — DIETITIAN INITIAL EVALUATION ADULT. - PERTINENT MEDS FT
MEDICATIONS  (STANDING):  norepinephrine Infusion 0.05 MICROgram(s)/kG/Min (6.7 mL/Hr) IV Continuous <Continuous>  pantoprazole  Injectable 40 milliGRAM(s) IV Push two times a day    MEDICATIONS  (PRN):

## 2020-12-12 NOTE — PROGRESS NOTE ADULT - SUBJECTIVE AND OBJECTIVE BOX
Patient is a 75y old  Male who presents with a chief complaint of Septic shock, gastric perforation, acute hypoxemic respiratory failure (12 Dec 2020 08:06)      HPI:  76 y/o M with a h/o IBS, MS, colonic distention (adynamic colon?), Parkinson's, HLD, presents with respiratory distress and hypoxemia. Noted to have large, firm, distended abdomen. Profound shock state requiring massive amount of norepinephrine infusion to maintain hemodynamics. Lactate of 11+. Patient is obtunded with moaning/grunting and increased work of breathing and hypoxemia despite NRB mask with 100% FiO2. CT A/P reveals gastric perforation. Patient has been seen by surgeon in ED who stated he is not a candidate for surgical intervention given hemodynamic instability. Patient has existing DNR/DNI advanced directives which have been confirmed by family, although at this time they state that they would like to pursue all aggressive measures other than CPR/endotracheal intubation at this time. (12 Dec 2020 04:46)  12/12:  unresponsive, not responding to conservative management family decided to stop pressors  ROS:.  [] A ten-point review of systems was otherwise negative except as noted.  Systemic:	[ ] Fever	[ ] Chills	[ ] Night sweats    [ ] Fatigue	[ ] Other  [] Cardiovascular:  [] Pulmonary:  [] Renal/Urologic:  [] Gastrointestinal: abdominal pain, vomiting  [] Metabolic:  [] Neurologic:  [] Hematologic:  [] ENT:  [] Ophthalmologic:  [] Musculoskeletal:    [X ] Due to altered mental status/intubation, subjective information were not able to be obtained from the patient. History was obtained, to the extent possible, from review of the chart and collateral sources of information.    All other system review is negative .  PAST MEDICAL & SURGICAL HISTORY:  Irritable bowel syndrome with constipation and diarrhea    Parkinsons disease    MS (multiple sclerosis)    S/P placement of cardiac pacemaker    History of brain surgery  for stimulator. 1997,1998      FAMILY HISTORY:  FHx: hypertension  father      Social History:     Alcohol: Denied  Smoking: Denied  Drug Use: Denied        Vital Signs Last 24 Hrs  T(C): 35.6 (12 Dec 2020 06:15), Max: 36.5 (11 Dec 2020 23:15)  T(F): 96 (12 Dec 2020 06:15), Max: 97.7 (11 Dec 2020 23:15)  HR: 60 (12 Dec 2020 10:00) (51 - 132)  BP: 60/22 (12 Dec 2020 10:00) (40/28 - 140/95)  BP(mean): 29 (12 Dec 2020 10:00) (29 - 94)  RR: 16 (12 Dec 2020 10:00) (6 - 28)  SpO2: 78% (12 Dec 2020 08:30) (65% - 86%)    I&O's Summary    11 Dec 2020 07:01  -  12 Dec 2020 07:00  --------------------------------------------------------  IN: 0 mL / OUT: 900 mL / NET: -900 mL        PHYSICAL EXAM:  Constitutional: Unresponsive  Eyes:  WNL  ENMT:  WNL  Neck:  WNL, non tender  Back: Non tender  Respiratory: CTABL  Cardiovascular:  S1+S2+0  Gastrointestinal: Soft, ND  Genitourinary:  WNL  Extremities: NV intact  Vascular:  Intact  Neurological: full exam not possible      Labs:                          13.8   13.12 )-----------( 192      ( 11 Dec 2020 23:00 )             45.5       12-11    143  |  110<H>  |  29<H>  ----------------------------<  237<H>  4.7   |  19<L>  |  1.85<H>    Ca    9.0      11 Dec 2020 23:00    TPro  7.8  /  Alb  3.2<L>  /  TBili  0.5  /  DBili  x   /  AST  35  /  ALT  21  /  AlkPhos  92  12-11

## 2020-12-12 NOTE — H&P ADULT - PROBLEM SELECTOR PLAN 1
Secondary to gastric perforation and likely bacterial peritonitis. Lactate 11.1 --> 11.4 despite prolonged infusion of massive dose rate of norepinephrine infusion in attempts to maintain a MAP > 65. Fixed dose vasopressin added. Unfortunately this is a catastrophic shock state with a grim prognosis.

## 2020-12-12 NOTE — H&P ADULT - PROBLEM SELECTOR PLAN 2
Suspect relation to colonic distention issues which had been thought to be related to MS/adynamic colon. Not a candidate for surgical intervention as per surgeon on-call given hemodynamic instability. Will medically manage for the time being with empiric broad spectrum abx. Blood cultures pending. IV opioid for pain control PRN.

## 2020-12-12 NOTE — H&P ADULT - PROBLEM SELECTOR PLAN 5
Discussed the case in great detail with the patient's son, Mino, for >30 mins. Described the catastrophic nature of the ongoing intraabdominal pathology and the profound hemodynamic consequences it is having on Aurelio. We discussed the non-option of surgical intervention and the inability to correct the underlying issue. Mino mentioned that his father had been admitted before with similar issues and recovered in the past, so he would like a trial of aggressive medical management. He understands that this is a much different scenario this time around given the amount of life support his father is requiring. He will reach out to other family members this morning and discuss the case and get back in touch with us to establish further goals of care.

## 2020-12-12 NOTE — PROGRESS NOTE ADULT - SUBJECTIVE AND OBJECTIVE BOX
HPI:     74 y/o M with a h/o IBS, MS, colonic distention (adynamic colon?), Parkinson's, HLD, presents with respiratory distress and hypoxemia. Noted to have large, firm, distended abdomen. Profound shock state requiring massive amount of norepinephrine infusion to maintain hemodynamics. Lactate of 11+. CT shows Gastric perforatiohn  was seen by surgery, Family has opted for comfort measures    PMH:  as above    ROS cant obtain        MEDICATIONS  (STANDING):  heparin   Injectable 5000 Unit(s) SubCutaneous every 8 hours  norepinephrine Infusion 0.05 MICROgram(s)/kG/Min (6.7 mL/Hr) IV Continuous <Continuous>  pantoprazole  Injectable 40 milliGRAM(s) IV Push two times a day  piperacillin/tazobactam IVPB.. 3.375 Gram(s) IV Intermittent every 8 hours  sodium chloride 0.9%. 1000 milliLiter(s) (125 mL/Hr) IV Continuous <Continuous>  vasopressin Infusion 0.04 Unit(s)/Min (2.4 mL/Hr) IV Continuous <Continuous>    Height (cm): 182.9 (12-11 @ 22:57)  Weight (kg): 143 (12-12 @ 00:19)  BMI (kg/m2): 42.7 (12-12 @ 00:19)    ICU Vital Signs Last 24 Hrs  T(C): 35.6 (12 Dec 2020 06:15), Max: 36.5 (11 Dec 2020 23:15)  T(F): 96 (12 Dec 2020 06:15), Max: 97.7 (11 Dec 2020 23:15)  HR: 114 (12 Dec 2020 07:30) (71 - 132)  BP: 40/28 (12 Dec 2020 07:30) (40/28 - 140/95)  BP(mean): 31 (12 Dec 2020 07:30) (31 - 93)  ABP: --  ABP(mean): --  RR: 21 (12 Dec 2020 07:30) (6 - 28)  SpO2: 65% (12 Dec 2020 07:30) (65% - 86%)    I&O's Summary    11 Dec 2020 07:01  -  12 Dec 2020 07:00  --------------------------------------------------------  IN: 0 mL / OUT: 900 mL / NET: -900 mL                          13.8   13.12 )-----------( 192      ( 11 Dec 2020 23:00 )             45.5       12-11    143  |  110<H>  |  29<H>  ----------------------------<  237<H>  4.7   |  19<L>  |  1.85<H>    Ca    9.0      11 Dec 2020 23:00    TPro  7.8  /  Alb  3.2<L>  /  TBili  0.5  /  DBili  x   /  AST  35  /  ALT  21  /  AlkPhos  92  12-11      CARDIAC MARKERS ( 11 Dec 2020 23:00 )  0.041 ng/mL / x     / x     / x     / x                                                                   Advanced Directives: Comfort measures only

## 2020-12-12 NOTE — H&P ADULT - CONSTITUTIONAL COMMENTS
in acute distress secondary to pain, moaning/grunting, distressed respirations, toxic appearing, unresponsive

## 2020-12-12 NOTE — PATIENT PROFILE ADULT - NSPROGENSOURCEINFO_GEN_A_NUR
unable to respond/family/health record/significant other/lacks capacity and has no surrogate decision maker

## 2020-12-12 NOTE — PROGRESS NOTE ADULT - ASSESSMENT
75 y old male with H/O MS, Parkinson ch GI dysmotility and multiple admission gastric pneumatosis presented  with respiratory distress, hypotension sepsis, found to have persistent gastric pneumatosis with free air around gastric fundus, GE junction Pt on two pressors with SBP in 70s to low 80s. Pt is DNR, DNI. I disused with son Brody at length at this time pt is not a surgical candidate, will not survive anesthesia and perioperative mortality is very high, he had denied surgery int h past. Family want trial of pressors resuscitation with fluids and antibiotics They  don't want invasive intervention considering  ch nature of his disease process.  discuss with ICU   IV hydration   NGT  PPI  IV antibiotics  Poor prognosis  Family wants comfort measures only, pressors to be discontinue pain control.  Palliative consult  Appreciate ICU care      
Patient with perforated gastric ulcer  with profound septic shock  severe lactic acidosis  respiratory failure  Family wants comfort measures only  considering d/cing pressors  will comply

## 2020-12-12 NOTE — H&P ADULT - PROBLEM SELECTOR PLAN 4
Secondary to ischemic ATN. Optimize end-organ perfusion with IV vasopressor. Trend BUN/Cr. Monitor lytes and UOP. Renally dose meds as necessary.

## 2020-12-12 NOTE — DIETITIAN INITIAL EVALUATION ADULT. - OTHER INFO
76 y/o M with a h/o IBS, MS, colonic distention (adynamic colon?), Parkinson's, HLD, presents with respiratory distress and hypoxemia. Noted to have large, firm, distended abdomen. Profound shock state requiring massive amount of norepinephrine infusion to maintain hemodynamics. Lactate of 11+. Patient is obtunded with moaning/grunting and increased work of breathing and hypoxemia despite NRB mask with 100% FiO2. CT A/P reveals gastric perforation. Patient has been seen by surgeon in ED who stated he is not a candidate for surgical intervention given hemodynamic instability. Patient has existing DNR/DNI advanced directives which have been confirmed by family, although at this time they state that they would like to pursue all aggressive measures other than CPR/endotracheal intubation at this time.    Consult generated for pressure injury. RD reviewed notes and pt moved to comfort measure, stopping pressor, not s/x candidate and poor prognosis. No nutrition recommendation needed at this time. Reconsult if needed

## 2020-12-12 NOTE — CONSULT NOTE ADULT - SUBJECTIVE AND OBJECTIVE BOX
REASON FOR CONSULT: ***    Chief Complaint    HPI: Acute on chronic respiratory distress.    PAST MEDICAL & SURGICAL HISTORY:  Irritable bowel syndrome with constipation and diarrhea  Parkinsons disease  MS (multiple sclerosis)  S/P placement of cardiac pacemaker  History of brain surgery  for stimulator. 1997,1998      Allergies  Pineapple (Unknown)  sulfa drugs (Unknown)      FAMILY HISTORY:  FHx: hypertension  father        Medications:  vancomycin  IVPB. 1000 milliGRAM(s) IV Intermittent once  norepinephrine Infusion 0.05 MICROgram(s)/kG/Min IV Continuous <Continuous>  norepinephrine Infusion 0.05 MICROgram(s)/kG/Min IV Continuous <Continuous>        Vital Signs Last 24 Hrs  T(C): 36.5 (11 Dec 2020 23:15), Max: 36.5 (11 Dec 2020 23:15)  T(F): 97.7 (11 Dec 2020 23:15), Max: 97.7 (11 Dec 2020 23:15)  HR: --100  BP: --75/palp    RR: --labored  SpO2: --85% on NRB      LABS:                        13.8   13.12 )-----------( 192      ( 11 Dec 2020 23:00 )             45.5     12-11    143  |  110<H>  |  29<H>  ----------------------------<  237<H>  4.7   |  19<L>  |  1.85<H>    Ca    9.0      11 Dec 2020 23:00    TPro  7.8  /  Alb  3.2<L>  /  TBili  0.5  /  DBili  x   /  AST  35  /  ALT  21  /  AlkPhos  92  12-11      CARDIAC MARKERS ( 11 Dec 2020 23:00 )  0.041 ng/mL / x     / x     / x     / x          POCT Blood Glucose.: 209 mg/dL (11 Dec 2020 22:58)        CULTURES: sent      Physical Examination:  Physical exam as per bedside MD (direct physical examination could not be performed because the visit was provided via Telemedicine):       RADIOLOGY: CXR- increased COR, RLL infultrate, distended stomach    IMP-  Respiratory failure  Elevated lactate  Ischemic bowel    Plan-  DNR/DNI  Inotropic support  ICU monitor    Prog- poor    CRITICAL CARE TIME SPENT: 30    This visit was provided via telemedicine. Patient was located at     [facility]  Provider was located at [employer] [facility].[address] for the visit.

## 2020-12-12 NOTE — CONSULT NOTE ADULT - SUBJECTIVE AND OBJECTIVE BOX
Patient is a 75y old  Male who presents with a chief complaint of Hypotension, respiratory distress (12 Dec 2020 01:24)    HPI:  · HPI Objective Statement: 74 y/o male in ED from NH with respiratory distress today. pt is DNR/DNI recently in  in 6/2020 for abd issues.Surgery was consulted for finding of persistent gastric pneumatosis with now free air  at gastric fundus, GE junction. Pt maxed on norepinephrine. Unresponsive, lactate 11. NGT dark brown fluid on insertion.     ROS:.  [] A ten-point review of systems was otherwise negative except as noted.  Systemic:	[ ] Fever	[ ] Chills	[ ] Night sweats    [ ] Fatigue	[ ] Other  [] Cardiovascular:  [] Pulmonary:  [] Renal/Urologic:  [] Gastrointestinal: abdominal pain, vomiting  [] Metabolic:  [] Neurologic:  [] Hematologic:  [] ENT:  [] Ophthalmologic:  [] Musculoskeletal:    [X ] Due to altered mental status/intubation, subjective information were not able to be obtained from the patient. History was obtained, to the extent possible, from review of the chart and collateral sources of information.    PAST MEDICAL & SURGICAL HISTORY:  Irritable bowel syndrome with constipation and diarrhea    Parkinsons disease    MS (multiple sclerosis)    S/P placement of cardiac pacemaker    History of brain surgery  for stimulator. 1997,1998      FAMILY HISTORY:  FHx: hypertension  father      Social History:    Alcohol: Denied  Smoking: Denied  Drug Use: Denied        Allergies    Pineapple (Unknown)  sulfa drugs (Unknown)    Intolerances      MEDICATIONS  (STANDING):  heparin   Injectable 5000 Unit(s) SubCutaneous every 8 hours  norepinephrine Infusion 0.05 MICROgram(s)/kG/Min (6.7 mL/Hr) IV Continuous <Continuous>  pantoprazole  Injectable 40 milliGRAM(s) IV Push two times a day  piperacillin/tazobactam IVPB. 3.375 Gram(s) IV Intermittent once  piperacillin/tazobactam IVPB.. 3.375 Gram(s) IV Intermittent every 8 hours  sodium chloride 0.9%. 1000 milliLiter(s) (125 mL/Hr) IV Continuous <Continuous>  vasopressin Infusion 0.04 Unit(s)/Min (2.4 mL/Hr) IV Continuous <Continuous>    Vital Signs Last 24 Hrs  T(C): 36.5 (11 Dec 2020 23:15), Max: 36.5 (11 Dec 2020 23:15)  T(F): 97.7 (11 Dec 2020 23:15), Max: 97.7 (11 Dec 2020 23:15)  HR: --  BP: --  BP(mean): --  RR: --  SpO2: --  PHYSICAL EXAM:  Constitutional: NAD, GCS: 12/15, pale, diaphoretic.  unresponsive   Eyes:  WNL  ENMT:  WNL  Neck:  WNL, non tender  Back: Non tender  Respiratory: CTABL, decreased breaths at lower lungs. on Venti mask.  Cardiovascular:  S1+S2+0  Gastrointestinal: Soft, distended, soft in between breathing. No board like rigidity, but unable to asses tenseness   Genitourinary:  WNL  Extremities: NV intact  Vascular:  Intact  Neurological: minimally responsive, moans, full exam not possible.  Skin: pale.        Labs:                          13.8   13.12 )-----------( 192      ( 11 Dec 2020 23:00 )             45.5       12-11    143  |  110<H>  |  29<H>  ----------------------------<  237<H>  4.7   |  19<L>  |  1.85<H>    Ca    9.0      11 Dec 2020 23:00    TPro  7.8  /  Alb  3.2<L>  /  TBili  0.5  /  DBili  x   /  AST  35  /  ALT  21  /  AlkPhos  92  12-11    Lactate, Blood: 11.4: Test Name: Lactate  Called by: JBaron  Called to:     KEVON Lovelace  Read back 2 Pt IDs: Y Read back values: Y Date/Tm: 12/12/20 02:51 mmol/L (12.12.20 @ 02:04)      Radiology Results:    < from: CT Abdomen and Pelvis w/ IV Cont (12.12.20 @ 02:24) >  IMPRESSION:    Perforated gastric pneumatosis with extraluminal air near the gastric fundus, superior aspect of the lesser curvature, left retrocrural region, posterior mediastinum near the GE junction and left pleural space. No organized fluid collection.    Distended visualized distal esophagus, stomach and small bowel with somewhat smooth transition in the left abdomen, which may represent ileus versus obstruction. Recommend clinical correlation.    Nonspecific bilateral perinephric stranding without hydroureteronephrosis. Known bladder stone. Diffuse bilateral thickening, which may be due to partial distention. Recommend clinical correlation to assess urinary tract infection.    Increased opacities of the left > right lower lobes and lingula since prior study, which may represent atelectasis. Recommend clinical correlation to assess pneumonia or aspiration.    Additional findings as described.

## 2020-12-12 NOTE — ED PROVIDER NOTE - CARE PLAN
Principal Discharge DX:	Respiratory distress  Secondary Diagnosis:	Hypotension, unspecified hypotension type

## 2020-12-12 NOTE — DIETITIAN INITIAL EVALUATION ADULT. - PERTINENT LABORATORY DATA
12-11 Na143 mmol/L Glu 237 mg/dL<H> K+ 4.7 mmol/L Cr  1.85 mg/dL<H> BUN 29 mg/dL<H> Phos n/a   Alb 3.2 g/dL<L> PAB n/a

## 2020-12-12 NOTE — ED PROVIDER NOTE - OBJECTIVE STATEMENT
76 y/o male in ED from NH with respiratory distress today.   per EMS, pt is DNR/DNI recently in  in 6/2020 for abd issues.   per EMS, pt unresponsive

## 2020-12-13 LAB
SARS-COV-2 IGG SERPL QL IA: NEGATIVE — SIGNIFICANT CHANGE UP
SARS-COV-2 IGM SERPL IA-ACNC: <0.1 INDEX — SIGNIFICANT CHANGE UP

## 2020-12-17 LAB
CULTURE RESULTS: SIGNIFICANT CHANGE UP
CULTURE RESULTS: SIGNIFICANT CHANGE UP
SPECIMEN SOURCE: SIGNIFICANT CHANGE UP
SPECIMEN SOURCE: SIGNIFICANT CHANGE UP

## 2020-12-24 DIAGNOSIS — K58.1 IRRITABLE BOWEL SYNDROME WITH CONSTIPATION: ICD-10-CM

## 2020-12-24 DIAGNOSIS — K63.89 OTHER SPECIFIED DISEASES OF INTESTINE: ICD-10-CM

## 2020-12-24 DIAGNOSIS — Z91.018 ALLERGY TO OTHER FOODS: ICD-10-CM

## 2020-12-24 DIAGNOSIS — Z95.0 PRESENCE OF CARDIAC PACEMAKER: ICD-10-CM

## 2020-12-24 DIAGNOSIS — A41.9 SEPSIS, UNSPECIFIED ORGANISM: ICD-10-CM

## 2020-12-24 DIAGNOSIS — K58.0 IRRITABLE BOWEL SYNDROME WITH DIARRHEA: ICD-10-CM

## 2020-12-24 DIAGNOSIS — J96.01 ACUTE RESPIRATORY FAILURE WITH HYPOXIA: ICD-10-CM

## 2020-12-24 DIAGNOSIS — G35 MULTIPLE SCLEROSIS: ICD-10-CM

## 2020-12-24 DIAGNOSIS — K55.9 VASCULAR DISORDER OF INTESTINE, UNSPECIFIED: ICD-10-CM

## 2020-12-24 DIAGNOSIS — Z66 DO NOT RESUSCITATE: ICD-10-CM

## 2020-12-24 DIAGNOSIS — Z51.5 ENCOUNTER FOR PALLIATIVE CARE: ICD-10-CM

## 2020-12-24 DIAGNOSIS — K65.9 PERITONITIS, UNSPECIFIED: ICD-10-CM

## 2020-12-24 DIAGNOSIS — K25.5 CHRONIC OR UNSPECIFIED GASTRIC ULCER WITH PERFORATION: ICD-10-CM

## 2020-12-24 DIAGNOSIS — N17.0 ACUTE KIDNEY FAILURE WITH TUBULAR NECROSIS: ICD-10-CM

## 2020-12-24 DIAGNOSIS — R65.21 SEVERE SEPSIS WITH SEPTIC SHOCK: ICD-10-CM

## 2020-12-24 DIAGNOSIS — Z20.828 CONTACT WITH AND (SUSPECTED) EXPOSURE TO OTHER VIRAL COMMUNICABLE DISEASES: ICD-10-CM

## 2020-12-24 DIAGNOSIS — E87.2 ACIDOSIS: ICD-10-CM

## 2020-12-24 DIAGNOSIS — Z88.2 ALLERGY STATUS TO SULFONAMIDES: ICD-10-CM

## 2020-12-24 DIAGNOSIS — G20 PARKINSON'S DISEASE: ICD-10-CM

## 2020-12-24 DIAGNOSIS — G93.41 METABOLIC ENCEPHALOPATHY: ICD-10-CM

## 2021-12-20 NOTE — ASU PREOP CHECKLIST - HEIGHT IN FEET
[facility]     Cardiologist:   Escort: To be determined on arrival   Pharmacy:   COVID-19:     CONFIRM MEDICATIONS UPON ARRIVAL   CONFIRM LAST DOSE OF XARELTO    Patient is a 77 y/o M, w/ PMHx of HTN, HLD, CAD (no CABG or Stents - on Xarelto 2.5 mg BID for prevention of major cardiovascular events as per COMPASS trial), Carotid Stenosis (s/p Carotid US 3/17 <50%), s/p malignant neoplasm of stomach 2009, stable lung nodule, PVD, TMJ, who presented to his cardiologist, Dr. Dominguez, with complaints of intermittent, non-radiating, substernal chest pain, that has been intermittent, but gradually worsening over the past few weeks, occurring with moderate exertion (walking less than 2 blocks or climbing less than 1 flight of stairs), and alleviated with rest. Patient also complains of intermittent palpitations described as fast heart beat that started suddenly and last several seconds without any apparent factors. Patient c/o dizziness that is gradually worsening described as a lightheadedness that is precipitated with no apparent factors. Patient denies SOB, fatigue, headache, chills, orthopnea, LE edema, cough, PND, recent travel, or sick contacts. ECHO: LA/RA size is normal. Mild basal LVH. Trace to mild MR/TR. EF: 64%.   NST 12/05/2021: Abnormal myocardial perfusion imaging, showing a small amount of ischemia in the inferior location. Overall LVSF normal. EF: 55%.          In light of patient's risk factors, CCS Class III anginal symptoms, and abnormal NST, patient presents for left heart cardiac catheterization with possible intervention if clinically indicated.  Cardiologist: Dr. Correa  Escort: Friend  Pharmacy: [facility]  COVID-19: Neg 12/17/2021 (in chart)    Patient is a 79 y/o M, w/ PMHx of HTN, HLD, CAD (no CABG or Stents - on Xarelto 2.5 mg BID (last dose Monday 12/20) for prevention of major cardiovascular events as per COMPASS trial), Carotid Stenosis (s/p Carotid US 3/17 <50%), s/p malignant neoplasm of stomach 2009, stable lung nodule, PVD, TMJ, who presented to his cardiologist, Dr. Dominguez, with complaints of intermittent, non-radiating, substernal chest pain, that has been intermittent, but gradually worsening over the past few weeks, occurring with moderate exertion (walking less than 2 blocks or climbing less than 1 flight of stairs), and alleviated with rest. Patient also complains of intermittent palpitations described as fast heart beat that started suddenly and last several seconds without any apparent factors. Patient c/o dizziness that is gradually worsening described as a lightheadedness that is precipitated with no apparent factors. Patient denies SOB, fatigue, headache, chills, orthopnea, LE edema, cough, PND, recent travel, or sick contacts. ECHO: LA/RA size is normal. Mild basal LVH. Trace to mild MR/TR. EF: 64%.  NST 12/05/2021: Abnormal myocardial perfusion imaging, showing a small amount of ischemia in the inferior location. Overall LVSF normal. EF: 55%.          In light of patient's risk factors, CCS Class III anginal symptoms, and abnormal NST, patient presents for left heart cardiac catheterization with possible intervention if clinically indicated.  6

## 2022-08-15 NOTE — ED ADULT NURSE NOTE - RECENT EXPOSURE TO
Informed Consent for Blood Component Transfusion Note    I have discussed with the patient the rationale for blood component transfusion; its benefits in treating or preventing fatigue, organ damage, or death; and its risk which includes mild transfusion reactions, rare risk of blood borne infection, or more serious but rare reactions. I have discussed the alternatives to transfusion, including the risk and consequences of not receiving transfusion. The patient had an opportunity to ask questions and had agreed to proceed with transfusion of blood components.     Electronically signed by Alexander Peñaloza MD on 8/15/22 at 8:50 AM EDT
none known

## 2022-11-18 NOTE — PATIENT PROFILE ADULT - ANY IMPAIRED UPPER EXTREMITY FUNCTION WITHIN A WEEK PRIOR TO ADMISSION RELATED TO?
Initial Radiation Treatment Planning (Will Render If Bill Simulation = Yes): The patient had a complete consultation regarding all applicable modalities for the treatment of their skin cancer and based on a variety of factors including the type of tumor, size, and location, the relevant medical history as well as local tissue factors, the functional status of the individual, the ability to perform necessary postoperative wound instructions and the need for simultaneous treatments as well as overall wound healing status, it was determined that the patient would begin radiation therapy treatment for skin cancer.  A full simulation and treatment device design was performed including the determination and formulation of appropriate devices including lead shield of 0.762 mm thickness to form molded shielding to specifically correlate with the lesion size including treatment margin.  The lead shield is adequate to accommodate the appropriate applicator and provide adequate shielding around the treatment site.  The specific field applicator, shields, and devices, as well as the specific patient setup is outlined below.  The patient was given a full consent for superficial radiation to both verbally and in writing and the full determination of patient's eligibility for treatment and selection is outlined on the patient eligibility and treatment selection form.  The specific superficial radiotherapy prescription was determined and was documented on the superficial radiotherapy prescription form.  A treatment calculation was also performed and documented on the treatment calculation form.  Based on the prescription, the patient was scheduled for a series of fractional treatments. no

## 2024-01-08 NOTE — ED ADULT NURSE NOTE - CAS EDN DISCHARGE INTERVENTIONS
PA completed on covermymeds key [id number]. Will await determination from insurance.    Arm band on/IV intact/Admission wristband placed